# Patient Record
Sex: FEMALE | Race: OTHER | Employment: OTHER | URBAN - METROPOLITAN AREA
[De-identification: names, ages, dates, MRNs, and addresses within clinical notes are randomized per-mention and may not be internally consistent; named-entity substitution may affect disease eponyms.]

---

## 2018-03-14 ENCOUNTER — HOSPITAL ENCOUNTER (OUTPATIENT)
Dept: PREADMISSION TESTING | Age: 58
Discharge: HOME | End: 2018-03-14
Payer: MEDICARE

## 2018-03-14 VITALS
SYSTOLIC BLOOD PRESSURE: 118 MMHG | BODY MASS INDEX: 30.73 KG/M2 | OXYGEN SATURATION: 98 % | RESPIRATION RATE: 18 BRPM | HEIGHT: 64 IN | HEART RATE: 77 BPM | TEMPERATURE: 98.2 F | DIASTOLIC BLOOD PRESSURE: 65 MMHG | WEIGHT: 180 LBS

## 2018-03-14 DIAGNOSIS — Z01.818 PREOP TESTING: Primary | ICD-10-CM

## 2018-03-14 LAB
EKG ATRIAL RATE: 70 BPM
EKG P AXIS: 55 DEGREES
EKG P-R INTERVAL: 134 MS
EKG Q-T INTERVAL: 394 MS
EKG QRS DURATION: 82 MS
EKG QTC CALCULATION (BAZETT): 425 MS
EKG R AXIS: 18 DEGREES
EKG T AXIS: 17 DEGREES
EKG VENTRICULAR RATE: 70 BPM
HCT VFR BLD CALC: 37.8 % (ref 34–48)
HEMOGLOBIN: 12.1 G/DL (ref 11.5–15.5)
MCH RBC QN AUTO: 28.2 PG (ref 26–35)
MCHC RBC AUTO-ENTMCNC: 32 % (ref 32–34.5)
MCV RBC AUTO: 88.1 FL (ref 80–99.9)
PDW BLD-RTO: 14.6 FL (ref 11.5–15)
PLATELET # BLD: 198 E9/L (ref 130–450)
PMV BLD AUTO: 11.5 FL (ref 7–12)
RBC # BLD: 4.29 E12/L (ref 3.5–5.5)
WBC # BLD: 5 E9/L (ref 4.5–11.5)

## 2018-03-14 PROCEDURE — 85027 COMPLETE CBC AUTOMATED: CPT

## 2018-03-14 PROCEDURE — 93005 ELECTROCARDIOGRAM TRACING: CPT

## 2018-03-14 PROCEDURE — 36415 COLL VENOUS BLD VENIPUNCTURE: CPT

## 2018-03-14 PROCEDURE — 87081 CULTURE SCREEN ONLY: CPT

## 2018-03-14 RX ORDER — FENTANYL CITRATE 50 UG/ML
100 INJECTION, SOLUTION INTRAMUSCULAR; INTRAVENOUS ONCE
Status: CANCELLED | OUTPATIENT
Start: 2018-03-14 | End: 2018-03-14

## 2018-03-14 RX ORDER — LIDOCAINE HYDROCHLORIDE 10 MG/ML
10 INJECTION, SOLUTION INFILTRATION; PERINEURAL
Status: CANCELLED | OUTPATIENT
Start: 2018-03-14 | End: 2018-03-14

## 2018-03-14 RX ORDER — ROPIVACAINE HYDROCHLORIDE 5 MG/ML
30 INJECTION, SOLUTION EPIDURAL; INFILTRATION; PERINEURAL
Status: CANCELLED | OUTPATIENT
Start: 2018-03-14 | End: 2018-03-14

## 2018-03-14 RX ORDER — DEXAMETHASONE SODIUM PHOSPHATE 4 MG/ML
4 INJECTION, SOLUTION INTRA-ARTICULAR; INTRALESIONAL; INTRAMUSCULAR; INTRAVENOUS; SOFT TISSUE ONCE
Status: CANCELLED | OUTPATIENT
Start: 2018-03-14 | End: 2018-03-14

## 2018-03-14 RX ORDER — MIDAZOLAM HYDROCHLORIDE 1 MG/ML
1 INJECTION INTRAMUSCULAR; INTRAVENOUS EVERY 5 MIN PRN
Status: CANCELLED | OUTPATIENT
Start: 2018-03-14

## 2018-03-14 NOTE — PROGRESS NOTES
[facility] PRE-ADMISSION TESTING INSTRUCTIONS  Surgery Date 3-21-18     Arrival 10:30 a.m. The Preadmission Testing patient is instructed accordingly using the following criteria (check applicable):    ARRIVAL INSTRUCTIONS:  [x] Parking the day of Surgery is located in the Main Entrance lot. Upon entering the door, make an immediate right to the surgery reception desk    [x] Complimentary [facility] Parking is available Monday through Friday 6 am to 6 pm    [x] Bring photo ID and insurance card    [] Bring in a copy of Living will or Durable Power of  papers. [] Please be sure to arrange transportation to and from the hospital    [] Please arrange for someone to be with you for the 24 hour period post procedure due to having anesthesia      GENERAL INSTRUCTIONS:    [x] Nothing by mouth after midnight, including gum, candy, mints or water    [x] You may brush your teeth, but do not swallow any water    [x] Take medications as instructed with 1-2 oz of water    [x] Stop herbal supplements and vitamins 5 days prior to procedure    [x] Follow preop dosing of blood thinners per physician instructions    [] Do not take insulin or oral diabetic medications    [] If diabetic and have low blood sugar or feel symptomatic, take 1-2oz apple juice or glucose tablets    [] Bring inhalers day of surgery    [] Bring C-PAP/ Bi-Pap day of surgery    [] Bring urine specimen day of surgery    [x] Shower or bath with soap, lather and rinse well, AM of Surgery, no lotion, powders or creams to surgical site    [] Follow bowel prep as instructed per surgeon    [x] No tobacco products within 24 hours of surgery     [x] No alcohol or illegal drug use within 24 hours of surgery.     [x] Jewelry, body piercing's, eyeglasses, contact lenses and dentures are not permitted into surgery (bring cases)      [x] Please do not wear any nail polish, make up or hair products on the day of surgery    [x] If not already

## 2018-03-16 LAB — MRSA CULTURE ONLY: NORMAL

## 2018-03-20 ENCOUNTER — PREP FOR PROCEDURE (OUTPATIENT)
Dept: ORTHOPEDIC SURGERY | Age: 58
End: 2018-03-20

## 2018-03-20 DIAGNOSIS — M20.22 HALLUX RIGIDUS OF LEFT FOOT: ICD-10-CM

## 2018-03-20 DIAGNOSIS — M19.072 ARTHRITIS OF LEFT ANKLE: Chronic | ICD-10-CM

## 2018-03-21 ENCOUNTER — ANESTHESIA (OUTPATIENT)
Dept: OPERATING ROOM | Age: 58
End: 2018-03-21
Payer: MEDICARE

## 2018-03-21 ENCOUNTER — HOSPITAL ENCOUNTER (OUTPATIENT)
Age: 58
Discharge: HOME | End: 2018-03-22
Attending: ORTHOPAEDIC SURGERY | Admitting: ORTHOPAEDIC SURGERY
Payer: MEDICARE

## 2018-03-21 ENCOUNTER — APPOINTMENT (OUTPATIENT)
Dept: GENERAL RADIOLOGY | Age: 58
End: 2018-03-21
Attending: ORTHOPAEDIC SURGERY
Payer: MEDICARE

## 2018-03-21 ENCOUNTER — ANESTHESIA EVENT (OUTPATIENT)
Dept: OPERATING ROOM | Age: 58
End: 2018-03-21
Payer: MEDICARE

## 2018-03-21 VITALS
TEMPERATURE: 97.2 F | OXYGEN SATURATION: 93 % | DIASTOLIC BLOOD PRESSURE: 90 MMHG | SYSTOLIC BLOOD PRESSURE: 162 MMHG | RESPIRATION RATE: 25 BRPM

## 2018-03-21 DIAGNOSIS — M19.072 ARTHRITIS OF ANKLE, LEFT: ICD-10-CM

## 2018-03-21 DIAGNOSIS — M19.072 ARTHRITIS OF LEFT ANKLE: Primary | Chronic | ICD-10-CM

## 2018-03-21 DIAGNOSIS — R52 PAIN: ICD-10-CM

## 2018-03-21 DIAGNOSIS — M20.22 HALLUX RIGIDUS OF LEFT FOOT: ICD-10-CM

## 2018-03-21 PROCEDURE — 2580000003 HC RX 258: Performed by: NURSE ANESTHETIST, CERTIFIED REGISTERED

## 2018-03-21 PROCEDURE — 6360000002 HC RX W HCPCS: Performed by: ANESTHESIOLOGY

## 2018-03-21 PROCEDURE — 2500000003 HC RX 250 WO HCPCS: Performed by: NURSE ANESTHETIST, CERTIFIED REGISTERED

## 2018-03-21 PROCEDURE — 3700000000 HC ANESTHESIA ATTENDED CARE: Performed by: ORTHOPAEDIC SURGERY

## 2018-03-21 PROCEDURE — 2580000003 HC RX 258: Performed by: ANESTHESIOLOGY

## 2018-03-21 PROCEDURE — 2720000010 HC SURG SUPPLY STERILE: Performed by: ORTHOPAEDIC SURGERY

## 2018-03-21 PROCEDURE — C1713 ANCHOR/SCREW BN/BN,TIS/BN: HCPCS | Performed by: ORTHOPAEDIC SURGERY

## 2018-03-21 PROCEDURE — 3600000004 HC SURGERY LEVEL 4 BASE: Performed by: ORTHOPAEDIC SURGERY

## 2018-03-21 PROCEDURE — 6360000002 HC RX W HCPCS: Performed by: NURSE ANESTHETIST, CERTIFIED REGISTERED

## 2018-03-21 PROCEDURE — 64445 NJX AA&/STRD SCIATIC NRV IMG: CPT | Performed by: ANESTHESIOLOGY

## 2018-03-21 PROCEDURE — 6360000002 HC RX W HCPCS: Performed by: STUDENT IN AN ORGANIZED HEALTH CARE EDUCATION/TRAINING PROGRAM

## 2018-03-21 PROCEDURE — C1776 JOINT DEVICE (IMPLANTABLE): HCPCS | Performed by: ORTHOPAEDIC SURGERY

## 2018-03-21 PROCEDURE — 88311 DECALCIFY TISSUE: CPT

## 2018-03-21 PROCEDURE — 3600000014 HC SURGERY LEVEL 4 ADDTL 15MIN: Performed by: ORTHOPAEDIC SURGERY

## 2018-03-21 PROCEDURE — 6370000000 HC RX 637 (ALT 250 FOR IP): Performed by: STUDENT IN AN ORGANIZED HEALTH CARE EDUCATION/TRAINING PROGRAM

## 2018-03-21 PROCEDURE — 6360000002 HC RX W HCPCS

## 2018-03-21 PROCEDURE — 76000 FLUOROSCOPY <1 HR PHYS/QHP: CPT

## 2018-03-21 PROCEDURE — 7100000000 HC PACU RECOVERY - FIRST 15 MIN: Performed by: ORTHOPAEDIC SURGERY

## 2018-03-21 PROCEDURE — 2700000000 HC OXYGEN THERAPY PER DAY

## 2018-03-21 PROCEDURE — 7100000001 HC PACU RECOVERY - ADDTL 15 MIN: Performed by: ORTHOPAEDIC SURGERY

## 2018-03-21 PROCEDURE — 2580000003 HC RX 258: Performed by: STUDENT IN AN ORGANIZED HEALTH CARE EDUCATION/TRAINING PROGRAM

## 2018-03-21 PROCEDURE — 3700000001 HC ADD 15 MINUTES (ANESTHESIA): Performed by: ORTHOPAEDIC SURGERY

## 2018-03-21 PROCEDURE — 88304 TISSUE EXAM BY PATHOLOGIST: CPT

## 2018-03-21 DEVICE — DISCONTINUED USE 415964 CEMENT PALACOS R + G SING DOSE 40GR: Type: IMPLANTABLE DEVICE | Site: ANKLE | Status: FUNCTIONAL

## 2018-03-21 DEVICE — SCREW, BONE REMOVER
Type: IMPLANTABLE DEVICE | Status: FUNCTIONAL
Brand: INBONE

## 2018-03-21 DEVICE — IMPLANTABLE DEVICE
Type: IMPLANTABLE DEVICE | Site: ANKLE | Status: FUNCTIONAL
Brand: INFINITY

## 2018-03-21 RX ORDER — FLUOXETINE HYDROCHLORIDE 20 MG/1
40 CAPSULE ORAL 2 TIMES DAILY
Status: DISCONTINUED | OUTPATIENT
Start: 2018-03-21 | End: 2018-03-22 | Stop reason: HOSPADM

## 2018-03-21 RX ORDER — HYDROCODONE BITARTRATE AND ACETAMINOPHEN 5; 325 MG/1; MG/1
2 TABLET ORAL EVERY 4 HOURS PRN
Status: DISCONTINUED | OUTPATIENT
Start: 2018-03-21 | End: 2018-03-22

## 2018-03-21 RX ORDER — SODIUM CHLORIDE 9 MG/ML
INJECTION, SOLUTION INTRAVENOUS CONTINUOUS
Status: DISCONTINUED | OUTPATIENT
Start: 2018-03-21 | End: 2018-03-22 | Stop reason: HOSPADM

## 2018-03-21 RX ORDER — ALPRAZOLAM 0.25 MG/1
0.5 TABLET ORAL EVERY 12 HOURS PRN
Status: DISCONTINUED | OUTPATIENT
Start: 2018-03-21 | End: 2018-03-22 | Stop reason: HOSPADM

## 2018-03-21 RX ORDER — NEOSTIGMINE METHYLSULFATE 1 MG/ML
INJECTION, SOLUTION INTRAVENOUS PRN
Status: DISCONTINUED | OUTPATIENT
Start: 2018-03-21 | End: 2018-03-21

## 2018-03-21 RX ORDER — DEXAMETHASONE SODIUM PHOSPHATE 4 MG/ML
INJECTION, SOLUTION INTRA-ARTICULAR; INTRALESIONAL; INTRAMUSCULAR; INTRAVENOUS; SOFT TISSUE PRN
Status: DISCONTINUED | OUTPATIENT
Start: 2018-03-21 | End: 2018-03-21 | Stop reason: SDUPTHER

## 2018-03-21 RX ORDER — LIDOCAINE HYDROCHLORIDE 20 MG/ML
INJECTION, SOLUTION INFILTRATION; PERINEURAL PRN
Status: DISCONTINUED | OUTPATIENT
Start: 2018-03-21 | End: 2018-03-21 | Stop reason: SDUPTHER

## 2018-03-21 RX ORDER — HYDROCODONE BITARTRATE AND ACETAMINOPHEN 5; 325 MG/1; MG/1
1 TABLET ORAL EVERY 4 HOURS PRN
Status: DISCONTINUED | OUTPATIENT
Start: 2018-03-21 | End: 2018-03-22

## 2018-03-21 RX ORDER — MORPHINE SULFATE 2 MG/ML
2 INJECTION, SOLUTION INTRAMUSCULAR; INTRAVENOUS
Status: DISCONTINUED | OUTPATIENT
Start: 2018-03-21 | End: 2018-03-22 | Stop reason: HOSPADM

## 2018-03-21 RX ORDER — ZOLPIDEM TARTRATE 5 MG/1
5 TABLET ORAL NIGHTLY PRN
Status: DISCONTINUED | OUTPATIENT
Start: 2018-03-21 | End: 2018-03-22 | Stop reason: HOSPADM

## 2018-03-21 RX ORDER — SODIUM CHLORIDE 0.9 % (FLUSH) 0.9 %
10 SYRINGE (ML) INJECTION PRN
Status: DISCONTINUED | OUTPATIENT
Start: 2018-03-21 | End: 2018-03-22 | Stop reason: HOSPADM

## 2018-03-21 RX ORDER — ONDANSETRON 2 MG/ML
INJECTION INTRAMUSCULAR; INTRAVENOUS PRN
Status: DISCONTINUED | OUTPATIENT
Start: 2018-03-21 | End: 2018-03-21 | Stop reason: SDUPTHER

## 2018-03-21 RX ORDER — FENTANYL CITRATE 50 UG/ML
INJECTION, SOLUTION INTRAMUSCULAR; INTRAVENOUS PRN
Status: DISCONTINUED | OUTPATIENT
Start: 2018-03-21 | End: 2018-03-21 | Stop reason: SDUPTHER

## 2018-03-21 RX ORDER — MORPHINE SULFATE 4 MG/ML
4 INJECTION, SOLUTION INTRAMUSCULAR; INTRAVENOUS
Status: DISCONTINUED | OUTPATIENT
Start: 2018-03-21 | End: 2018-03-22 | Stop reason: HOSPADM

## 2018-03-21 RX ORDER — LIDOCAINE HYDROCHLORIDE 10 MG/ML
10 INJECTION, SOLUTION INFILTRATION; PERINEURAL
Status: DISCONTINUED | OUTPATIENT
Start: 2018-03-21 | End: 2018-03-21 | Stop reason: HOSPADM

## 2018-03-21 RX ORDER — ROPIVACAINE HYDROCHLORIDE 5 MG/ML
30 INJECTION, SOLUTION EPIDURAL; INFILTRATION; PERINEURAL
Status: COMPLETED | OUTPATIENT
Start: 2018-03-21 | End: 2018-03-21

## 2018-03-21 RX ORDER — ACETAMINOPHEN 325 MG/1
650 TABLET ORAL EVERY 4 HOURS PRN
Status: DISCONTINUED | OUTPATIENT
Start: 2018-03-21 | End: 2018-03-22 | Stop reason: HOSPADM

## 2018-03-21 RX ORDER — GLYCOPYRROLATE 0.2 MG/ML
INJECTION INTRAMUSCULAR; INTRAVENOUS PRN
Status: DISCONTINUED | OUTPATIENT
Start: 2018-03-21 | End: 2018-03-21 | Stop reason: SDUPTHER

## 2018-03-21 RX ORDER — DEXAMETHASONE SODIUM PHOSPHATE 4 MG/ML
4 INJECTION, SOLUTION INTRA-ARTICULAR; INTRALESIONAL; INTRAMUSCULAR; INTRAVENOUS; SOFT TISSUE ONCE
Status: DISCONTINUED | OUTPATIENT
Start: 2018-03-21 | End: 2018-03-21

## 2018-03-21 RX ORDER — PROPOFOL 10 MG/ML
INJECTION, EMULSION INTRAVENOUS PRN
Status: DISCONTINUED | OUTPATIENT
Start: 2018-03-21 | End: 2018-03-21 | Stop reason: SDUPTHER

## 2018-03-21 RX ORDER — NEOSTIGMINE METHYLSULFATE 1 MG/ML
INJECTION, SOLUTION INTRAVENOUS PRN
Status: DISCONTINUED | OUTPATIENT
Start: 2018-03-21 | End: 2018-03-21 | Stop reason: SDUPTHER

## 2018-03-21 RX ORDER — FENTANYL CITRATE 50 UG/ML
100 INJECTION, SOLUTION INTRAMUSCULAR; INTRAVENOUS ONCE
Status: COMPLETED | OUTPATIENT
Start: 2018-03-21 | End: 2018-03-21

## 2018-03-21 RX ORDER — DOCUSATE SODIUM 100 MG/1
100 CAPSULE, LIQUID FILLED ORAL 2 TIMES DAILY
Status: DISCONTINUED | OUTPATIENT
Start: 2018-03-21 | End: 2018-03-22 | Stop reason: HOSPADM

## 2018-03-21 RX ORDER — ROCURONIUM BROMIDE 10 MG/ML
INJECTION, SOLUTION INTRAVENOUS PRN
Status: DISCONTINUED | OUTPATIENT
Start: 2018-03-21 | End: 2018-03-21 | Stop reason: SDUPTHER

## 2018-03-21 RX ORDER — MORPHINE SULFATE 2 MG/ML
INJECTION, SOLUTION INTRAMUSCULAR; INTRAVENOUS
Status: COMPLETED
Start: 2018-03-21 | End: 2018-03-21

## 2018-03-21 RX ORDER — MORPHINE SULFATE 2 MG/ML
2 INJECTION, SOLUTION INTRAMUSCULAR; INTRAVENOUS EVERY 5 MIN PRN
Status: COMPLETED | OUTPATIENT
Start: 2018-03-21 | End: 2018-03-21

## 2018-03-21 RX ORDER — HYDROCODONE BITARTRATE AND ACETAMINOPHEN 5; 325 MG/1; MG/1
1 TABLET ORAL EVERY 4 HOURS PRN
Qty: 40 TABLET | Refills: 0 | Status: SHIPPED | OUTPATIENT
Start: 2018-03-21 | End: 2018-03-28

## 2018-03-21 RX ORDER — SODIUM CHLORIDE 0.9 % (FLUSH) 0.9 %
10 SYRINGE (ML) INJECTION EVERY 12 HOURS SCHEDULED
Status: DISCONTINUED | OUTPATIENT
Start: 2018-03-21 | End: 2018-03-22 | Stop reason: HOSPADM

## 2018-03-21 RX ORDER — MIDAZOLAM HYDROCHLORIDE 1 MG/ML
INJECTION INTRAMUSCULAR; INTRAVENOUS PRN
Status: DISCONTINUED | OUTPATIENT
Start: 2018-03-21 | End: 2018-03-21 | Stop reason: SDUPTHER

## 2018-03-21 RX ORDER — ONDANSETRON 2 MG/ML
4 INJECTION INTRAMUSCULAR; INTRAVENOUS EVERY 6 HOURS PRN
Status: DISCONTINUED | OUTPATIENT
Start: 2018-03-21 | End: 2018-03-22 | Stop reason: HOSPADM

## 2018-03-21 RX ORDER — SODIUM CHLORIDE 9 MG/ML
INJECTION, SOLUTION INTRAVENOUS CONTINUOUS PRN
Status: DISCONTINUED | OUTPATIENT
Start: 2018-03-21 | End: 2018-03-21 | Stop reason: SDUPTHER

## 2018-03-21 RX ORDER — MIDAZOLAM HYDROCHLORIDE 1 MG/ML
1 INJECTION INTRAMUSCULAR; INTRAVENOUS EVERY 5 MIN PRN
Status: COMPLETED | OUTPATIENT
Start: 2018-03-21 | End: 2018-03-21

## 2018-03-21 RX ORDER — KETOROLAC TROMETHAMINE 30 MG/ML
30 INJECTION, SOLUTION INTRAMUSCULAR; INTRAVENOUS ONCE
Status: COMPLETED | OUTPATIENT
Start: 2018-03-21 | End: 2018-03-21

## 2018-03-21 RX ADMIN — SODIUM CHLORIDE: 9 INJECTION, SOLUTION INTRAVENOUS at 13:25

## 2018-03-21 RX ADMIN — PHENYLEPHRINE HYDROCHLORIDE 100 MCG: 10 INJECTION INTRAVENOUS at 14:05

## 2018-03-21 RX ADMIN — DOCUSATE SODIUM 100 MG: 100 CAPSULE, LIQUID FILLED ORAL at 20:45

## 2018-03-21 RX ADMIN — Medication 10 ML: at 21:49

## 2018-03-21 RX ADMIN — SODIUM CHLORIDE: 9 INJECTION, SOLUTION INTRAVENOUS at 17:45

## 2018-03-21 RX ADMIN — ONDANSETRON 4 MG: 2 INJECTION, SOLUTION INTRAMUSCULAR; INTRAVENOUS at 16:00

## 2018-03-21 RX ADMIN — FENTANYL CITRATE 50 MCG: 50 INJECTION, SOLUTION INTRAMUSCULAR; INTRAVENOUS at 15:20

## 2018-03-21 RX ADMIN — Medication 2 MG: at 16:32

## 2018-03-21 RX ADMIN — ROCURONIUM BROMIDE 50 MG: 10 SOLUTION INTRAVENOUS at 13:33

## 2018-03-21 RX ADMIN — MORPHINE SULFATE 2 MG: 2 INJECTION, SOLUTION INTRAMUSCULAR; INTRAVENOUS at 17:14

## 2018-03-21 RX ADMIN — Medication 0.4 MG: at 16:32

## 2018-03-21 RX ADMIN — MIDAZOLAM HYDROCHLORIDE 1 MG: 1 INJECTION, SOLUTION INTRAMUSCULAR; INTRAVENOUS at 12:20

## 2018-03-21 RX ADMIN — ROCURONIUM BROMIDE 15 MG: 10 SOLUTION INTRAVENOUS at 15:15

## 2018-03-21 RX ADMIN — MIDAZOLAM HYDROCHLORIDE 1 MG: 1 INJECTION, SOLUTION INTRAMUSCULAR; INTRAVENOUS at 12:14

## 2018-03-21 RX ADMIN — ROPIVACAINE HYDROCHLORIDE 30 ML: 5 INJECTION, SOLUTION EPIDURAL; INFILTRATION; PERINEURAL at 12:36

## 2018-03-21 RX ADMIN — SODIUM CHLORIDE: 9 INJECTION, SOLUTION INTRAVENOUS at 19:50

## 2018-03-21 RX ADMIN — DEXAMETHASONE SODIUM PHOSPHATE 10 MG: 4 INJECTION, SOLUTION INTRA-ARTICULAR; INTRALESIONAL; INTRAMUSCULAR; INTRAVENOUS; SOFT TISSUE at 13:33

## 2018-03-21 RX ADMIN — MORPHINE SULFATE 2 MG: 2 INJECTION, SOLUTION INTRAMUSCULAR; INTRAVENOUS at 17:20

## 2018-03-21 RX ADMIN — MORPHINE SULFATE 2 MG: 2 INJECTION, SOLUTION INTRAMUSCULAR; INTRAVENOUS at 17:26

## 2018-03-21 RX ADMIN — FENTANYL CITRATE 50 MCG: 50 INJECTION, SOLUTION INTRAMUSCULAR; INTRAVENOUS at 15:15

## 2018-03-21 RX ADMIN — MORPHINE SULFATE 2 MG: 2 INJECTION, SOLUTION INTRAMUSCULAR; INTRAVENOUS at 17:43

## 2018-03-21 RX ADMIN — FENTANYL CITRATE 100 MCG: 50 INJECTION, SOLUTION INTRAMUSCULAR; INTRAVENOUS at 12:14

## 2018-03-21 RX ADMIN — PROPOFOL 200 MG: 10 INJECTION, EMULSION INTRAVENOUS at 13:33

## 2018-03-21 RX ADMIN — PHENYLEPHRINE HYDROCHLORIDE 100 MCG: 10 INJECTION INTRAVENOUS at 13:59

## 2018-03-21 RX ADMIN — MIDAZOLAM HYDROCHLORIDE 2 MG: 1 INJECTION, SOLUTION INTRAMUSCULAR; INTRAVENOUS at 13:25

## 2018-03-21 RX ADMIN — ZOLPIDEM TARTRATE 5 MG: 5 TABLET, FILM COATED ORAL at 21:49

## 2018-03-21 RX ADMIN — CEFAZOLIN SODIUM 2 G: 2 SOLUTION INTRAVENOUS at 13:25

## 2018-03-21 RX ADMIN — KETOROLAC TROMETHAMINE 30 MG: 30 INJECTION, SOLUTION INTRAMUSCULAR; INTRAVENOUS at 17:38

## 2018-03-21 RX ADMIN — HYDROCODONE BITARTRATE AND ACETAMINOPHEN 2 TABLET: 5; 325 TABLET ORAL at 20:45

## 2018-03-21 RX ADMIN — LIDOCAINE HYDROCHLORIDE 60 MG: 20 INJECTION, SOLUTION INFILTRATION; PERINEURAL at 13:33

## 2018-03-21 RX ADMIN — ROCURONIUM BROMIDE 10 MG: 10 SOLUTION INTRAVENOUS at 15:35

## 2018-03-21 RX ADMIN — CEFAZOLIN SODIUM 2 G: 2 SOLUTION INTRAVENOUS at 21:00

## 2018-03-21 RX ADMIN — MORPHINE SULFATE 2 MG: 2 INJECTION, SOLUTION INTRAMUSCULAR; INTRAVENOUS at 17:07

## 2018-03-21 RX ADMIN — FENTANYL CITRATE 100 MCG: 50 INJECTION, SOLUTION INTRAMUSCULAR; INTRAVENOUS at 13:33

## 2018-03-21 ASSESSMENT — PULMONARY FUNCTION TESTS
PIF_VALUE: 28
PIF_VALUE: 29
PIF_VALUE: 28
PIF_VALUE: 27
PIF_VALUE: 29
PIF_VALUE: 27
PIF_VALUE: 28
PIF_VALUE: 28
PIF_VALUE: 27
PIF_VALUE: 28
PIF_VALUE: 42
PIF_VALUE: 26
PIF_VALUE: 28
PIF_VALUE: 29
PIF_VALUE: 28
PIF_VALUE: 28
PIF_VALUE: 27
PIF_VALUE: 27
PIF_VALUE: 20
PIF_VALUE: 27
PIF_VALUE: 29
PIF_VALUE: 27
PIF_VALUE: 28
PIF_VALUE: 27
PIF_VALUE: 6
PIF_VALUE: 27
PIF_VALUE: 27
PIF_VALUE: 4
PIF_VALUE: 28
PIF_VALUE: 27
PIF_VALUE: 28
PIF_VALUE: 19
PIF_VALUE: 28
PIF_VALUE: 28
PIF_VALUE: 5
PIF_VALUE: 28
PIF_VALUE: 3
PIF_VALUE: 26
PIF_VALUE: 27
PIF_VALUE: 29
PIF_VALUE: 28
PIF_VALUE: 19
PIF_VALUE: 28
PIF_VALUE: 29
PIF_VALUE: 28
PIF_VALUE: 19
PIF_VALUE: 27
PIF_VALUE: 27
PIF_VALUE: 28
PIF_VALUE: 27
PIF_VALUE: 28
PIF_VALUE: 27
PIF_VALUE: 28
PIF_VALUE: 27
PIF_VALUE: 27
PIF_VALUE: 1
PIF_VALUE: 27
PIF_VALUE: 28
PIF_VALUE: 28
PIF_VALUE: 6
PIF_VALUE: 27
PIF_VALUE: 28
PIF_VALUE: 28
PIF_VALUE: 27
PIF_VALUE: 28
PIF_VALUE: 27
PIF_VALUE: 27
PIF_VALUE: 28
PIF_VALUE: 27
PIF_VALUE: 28
PIF_VALUE: 28
PIF_VALUE: 27
PIF_VALUE: 28
PIF_VALUE: 27
PIF_VALUE: 3
PIF_VALUE: 29
PIF_VALUE: 27
PIF_VALUE: 28
PIF_VALUE: 27
PIF_VALUE: 28
PIF_VALUE: 28
PIF_VALUE: 2
PIF_VALUE: 28
PIF_VALUE: 27
PIF_VALUE: 9
PIF_VALUE: 28
PIF_VALUE: 27
PIF_VALUE: 2
PIF_VALUE: 20
PIF_VALUE: 27
PIF_VALUE: 28
PIF_VALUE: 27
PIF_VALUE: 28
PIF_VALUE: 20
PIF_VALUE: 20
PIF_VALUE: 27
PIF_VALUE: 28
PIF_VALUE: 27
PIF_VALUE: 27
PIF_VALUE: 2
PIF_VALUE: 27
PIF_VALUE: 28
PIF_VALUE: 28
PIF_VALUE: 27
PIF_VALUE: 28
PIF_VALUE: 27
PIF_VALUE: 27
PIF_VALUE: 1
PIF_VALUE: 27
PIF_VALUE: 28
PIF_VALUE: 27
PIF_VALUE: 28
PIF_VALUE: 27
PIF_VALUE: 28
PIF_VALUE: 28
PIF_VALUE: 19
PIF_VALUE: 3
PIF_VALUE: 28
PIF_VALUE: 28
PIF_VALUE: 29
PIF_VALUE: 28
PIF_VALUE: 28
PIF_VALUE: 27
PIF_VALUE: 27
PIF_VALUE: 28
PIF_VALUE: 19
PIF_VALUE: 28
PIF_VALUE: 28
PIF_VALUE: 27
PIF_VALUE: 20
PIF_VALUE: 27
PIF_VALUE: 28
PIF_VALUE: 28
PIF_VALUE: 20
PIF_VALUE: 27
PIF_VALUE: 28
PIF_VALUE: 27
PIF_VALUE: 28
PIF_VALUE: 28
PIF_VALUE: 27
PIF_VALUE: 28
PIF_VALUE: 4
PIF_VALUE: 28
PIF_VALUE: 28
PIF_VALUE: 27
PIF_VALUE: 1
PIF_VALUE: 20
PIF_VALUE: 1
PIF_VALUE: 27
PIF_VALUE: 27
PIF_VALUE: 28
PIF_VALUE: 27
PIF_VALUE: 20
PIF_VALUE: 28
PIF_VALUE: 27
PIF_VALUE: 28
PIF_VALUE: 27
PIF_VALUE: 20
PIF_VALUE: 12
PIF_VALUE: 27
PIF_VALUE: 27

## 2018-03-21 ASSESSMENT — PAIN DESCRIPTION - ORIENTATION
ORIENTATION: LEFT

## 2018-03-21 ASSESSMENT — PAIN SCALES - GENERAL
PAINLEVEL_OUTOF10: 3
PAINLEVEL_OUTOF10: 7
PAINLEVEL_OUTOF10: 7
PAINLEVEL_OUTOF10: 0
PAINLEVEL_OUTOF10: 5
PAINLEVEL_OUTOF10: 9
PAINLEVEL_OUTOF10: 0
PAINLEVEL_OUTOF10: 4
PAINLEVEL_OUTOF10: 7
PAINLEVEL_OUTOF10: 9
PAINLEVEL_OUTOF10: 7
PAINLEVEL_OUTOF10: 7
PAINLEVEL_OUTOF10: 8
PAINLEVEL_OUTOF10: 8

## 2018-03-21 ASSESSMENT — PAIN DESCRIPTION - PAIN TYPE
TYPE: SURGICAL PAIN

## 2018-03-21 ASSESSMENT — PAIN DESCRIPTION - LOCATION
LOCATION: ANKLE

## 2018-03-21 ASSESSMENT — PAIN DESCRIPTION - DESCRIPTORS
DESCRIPTORS: DISCOMFORT
DESCRIPTORS: ACHING;DISCOMFORT
DESCRIPTORS: DISCOMFORT

## 2018-03-21 ASSESSMENT — PAIN DESCRIPTION - ONSET: ONSET: ON-GOING

## 2018-03-21 ASSESSMENT — PAIN DESCRIPTION - FREQUENCY
FREQUENCY: CONTINUOUS

## 2018-03-21 ASSESSMENT — PAIN - FUNCTIONAL ASSESSMENT: PAIN_FUNCTIONAL_ASSESSMENT: 0-10

## 2018-03-21 ASSESSMENT — PAIN DESCRIPTION - PROGRESSION: CLINICAL_PROGRESSION: GRADUALLY IMPROVING

## 2018-03-21 NOTE — ANESTHESIA PRE PROCEDURE
Department of Anesthesiology  Preprocedure Note       Name:  Lennox Becker   Age:  59 y.o.  :  1960                                          MRN:  [id number]         Date:  3/21/2018      Surgeon: Cordelia Cobos):  Cora Gaitan MD    Procedure: Procedure(s):  LEFT TOTAL ANKLE REPLACEMENT LEFT FOOT CHEILECTOMY  POSSIBLE ACHILLES TENDON LENGTHENING  ([facility]) ([facility])    Medications prior to admission:   Prior to Admission medications    Medication Sig Start Date End Date Taking? Authorizing Provider   zolpidem (AMBIEN) 5 MG tablet Take 5 mg by mouth nightly as needed for Sleep    Historical Provider, MD   Omega-3 Fatty Acids (FISH OIL PO) Take 1 tablet by mouth daily  16    Historical Provider, MD   Calcium Carbonate-Vitamin D (CALCIUM + D) 600-200 MG-UNIT TABS Take  by mouth daily. Historical Provider, MD   FLUoxetine (PROZAC) 20 MG capsule Take 40 mg by mouth 2 times daily Instructed to take morning of surgery with a sip of water (takes total of 60 mg daily)    Historical Provider, MD   ALPRAZolam (XANAX) 0.5 MG tablet Take 0.5 mg by mouth as needed for Anxiety Instructed to take morning of surgery with a sip of water if needed. Historical Provider, MD   Multiple Vitamins-Minerals (CENTRUM PO) Take by mouth  16    Historical Provider, MD       Current medications:    No current facility-administered medications for this visit. No current outpatient prescriptions on file.      Facility-Administered Medications Ordered in Other Visits   Medication Dose Route Frequency Provider Last Rate Last Dose    ceFAZolin (ANCEF) 2 g in dextrose 3 % 50 mL IVPB (duplex)  2 g Intravenous See Admin Instructions Sanket Hemp, DO        dexamethasone (DECADRON) injection 4 mg  4 mg Intravenous Once Raghavendra Hay MD        fentaNYL (SUBLIMAZE) injection 100 mcg  100 mcg Intravenous Once Raghavendra Hay MD        lidocaine 1 % injection 10 mL  10 mL Intradermal Once PRN Raghavendra Hay MD  midazolam (VERSED) injection 1 mg  1 mg Intravenous Q5 Min PRN Mata Ye MD        ropivacaine (NAROPIN) 0.5% injection 30 mL  30 mL Infiltration Once PRN Mata Ye MD           Allergies: Allergies   Allergen Reactions    Percocet [Oxycodone-Acetaminophen] Nausea And Vomiting and Other (See Comments)     Felt like \"bugs crawling on skin\"    Percodan [Oxycodone-Aspirin] Nausea And Vomiting       Problem List:    Patient Active Problem List   Diagnosis Code    Arthritis of left ankle M19.072    Hallux rigidus of left foot M20.22    Arthritis of ankle, left M19.072       Past Medical History:        Diagnosis Date    Anxiety     Arthritis     RA    Asthma     no treatment, had as a child    Depression     Diverticulitis     History of stomach ulcers     IBS (irritable bowel syndrome) \"I have 2 kinks in my bowel\"    PTSD (post-traumatic stress disorder)        Past Surgical History:        Procedure Laterality Date    ANKLE SURGERY      right, left    BUNIONECTOMY      right , left    CARPAL TUNNEL RELEASE      right    FOOT NEUROMA SURGERY      bilat    KNEE ARTHROSCOPY      left    OTHER SURGICAL HISTORY      excision neck mass    SHOULDER SURGERY      right, spurs    TONSILLECTOMY      TUBAL LIGATION      UPPER GASTROINTESTINAL ENDOSCOPY  4/4/2016       Social History:    Social History   Substance Use Topics    Smoking status: Former Smoker     Packs/day: 0.25     Years: 10.00     Quit date: 11/9/2010    Smokeless tobacco: Never Used    Alcohol use Yes      Comment: social                                Counseling given: Not Answered      Vital Signs (Current): There were no vitals filed for this visit.                                            BP Readings from Last 3 Encounters:   03/21/18 (!) 115/59   03/14/18 118/65   08/11/16 100/58       NPO Status:                                                                                 BMI:   Wt Readings from Last 3

## 2018-03-21 NOTE — PROGRESS NOTES
Report to Malvin Villanueva 7th floor  [address] [facility] recalled to see paper for [facility] to set up for discharge

## 2018-03-21 NOTE — H&P
Department of Orthopedic Surgery  Resident H&P Note          CHIEF COMPLAINT:  L ankle DJD, L Hallux Rigidus    HISTORY OF PRESENT ILLNESS:                The patient is a 59 y.o. female who presents for L total ankle arthroplasty and L cheilectomy great toe. Longstanding RA with DJD of the L ankle and L great to MTPJ. Has not gotten relief with conservative tx. Past Medical History:        Diagnosis Date    Anxiety     Arthritis     RA    Asthma     no treatment, had as a child    Depression     Diverticulitis     History of stomach ulcers     IBS (irritable bowel syndrome) \"I have 2 kinks in my bowel\"    PTSD (post-traumatic stress disorder)      Past Surgical History:        Procedure Laterality Date    ANKLE SURGERY      right, left    BUNIONECTOMY      right , left    CARPAL TUNNEL RELEASE      right    FOOT NEUROMA SURGERY      bilat    KNEE ARTHROSCOPY      left    OTHER SURGICAL HISTORY      excision neck mass    SHOULDER SURGERY      right, spurs    TONSILLECTOMY      TUBAL LIGATION      UPPER GASTROINTESTINAL ENDOSCOPY  4/4/2016     Current Medications:   Current Facility-Administered Medications: ceFAZolin (ANCEF) 2 g in dextrose 3 % 50 mL IVPB (duplex), 2 g, Intravenous, See Admin Instructions  lidocaine 1 % injection 10 mL, 10 mL, Intradermal, Once PRN  HYDROmorphone (DILAUDID) injection 0.25 mg, 0.25 mg, Intravenous, Q5 Min PRN  HYDROmorphone (DILAUDID) injection 0.5 mg, 0.5 mg, Intravenous, Q5 Min PRN  HYDROmorphone (DILAUDID) injection 0.25 mg, 0.25 mg, Intravenous, Q5 Min PRN  HYDROmorphone (DILAUDID) injection 0.5 mg, 0.5 mg, Intravenous, Q5 Min PRN  Allergies:  Percocet [oxycodone-acetaminophen] and Percodan [oxycodone-aspirin]    Social History:   TOBACCO:   reports that she quit smoking about 7 years ago. She has a 2.50 pack-year smoking history. She has never used smokeless tobacco.  ETOH:   reports that she drinks alcohol.   DRUGS:   reports that she does not use drugs.  ACTIVITIES OF DAILY LIVING:    OCCUPATION:    Family History:   History reviewed. No pertinent family history.     REVIEW OF SYSTEMS:  CONSTITUTIONAL: negative for fevers, chills  EYES: negative for blurred vision, visual disturbance  HEENT: negative for hearing loss, voice change  RESPIRATORY: negative for dyspnea, wheezing  CARDIOVASCULAR: negative for chest pain, palpitations  GASTROINTESTINAL: negative for nausea, vomiting  GENITOURINARY: negative for frequency, urinary incontinence  HEMATOLOGIC/LYMPHATIC: negative for bleeding and petechiae  MUSCULOSKELETAL: + pain  NEUROLOGICAL: negative for headaches, dizziness  BEHAVIOR/PSYCH: negative for increased agitation and anxiety    PHYSICAL EXAM:    VITALS:  /64   Pulse 62   Temp 97.2 °F (36.2 °C) (Temporal)   Resp 14   Ht 5' 4\" (1.626 m)   Wt 180 lb (81.6 kg)   SpO2 94%   BMI 30.90 kg/m²   CONSTITUTIONAL:  awake, alert, cooperative, no apparent distress, and appears stated age  MUSCULOSKELETAL:  Left lower Extremity:  · Skin intact, prior incision over great toe MTPJ well healed  · Decreased ROM of great toe, palpable osteophytes to MTPJ  · Decreased and painful ROM to ankle joint  · Palpable DP and PT  · Sensation intact to light touch to T, S, S, SP, DP  · Demonstrates active ROM to ankle and toes    DATA:    CBC:   Lab Results   Component Value Date    WBC 5.0 03/14/2018    RBC 4.29 03/14/2018    HGB 12.1 03/14/2018    HCT 37.8 03/14/2018    MCV 88.1 03/14/2018    MCH 28.2 03/14/2018    MCHC 32.0 03/14/2018    RDW 14.6 03/14/2018     03/14/2018    MPV 11.5 03/14/2018     PT/INR:  No results found for: PROTIME, INR      IMPRESSION:  · L ankle DJD, L Hallux Rigidus    PLAN:  · L Total ankle arthroplasty, L great toe cheilectomy  · Discussed with Dr. Melinda Watkins

## 2018-03-22 VITALS
BODY MASS INDEX: 38.48 KG/M2 | DIASTOLIC BLOOD PRESSURE: 57 MMHG | RESPIRATION RATE: 16 BRPM | SYSTOLIC BLOOD PRESSURE: 120 MMHG | HEIGHT: 64 IN | TEMPERATURE: 98.6 F | WEIGHT: 225.4 LBS | HEART RATE: 80 BPM | OXYGEN SATURATION: 99 %

## 2018-03-22 LAB
HCT VFR BLD CALC: 34 % (ref 34–48)
HEMOGLOBIN: 10.5 G/DL (ref 11.5–15.5)
MCH RBC QN AUTO: 28.5 PG (ref 26–35)
MCHC RBC AUTO-ENTMCNC: 30.9 % (ref 32–34.5)
MCV RBC AUTO: 92.1 FL (ref 80–99.9)
PDW BLD-RTO: 15 FL (ref 11.5–15)
PLATELET # BLD: 189 E9/L (ref 130–450)
PMV BLD AUTO: 11.6 FL (ref 7–12)
RBC # BLD: 3.69 E12/L (ref 3.5–5.5)
WBC # BLD: 8.4 E9/L (ref 4.5–11.5)

## 2018-03-22 PROCEDURE — 97161 PT EVAL LOW COMPLEX 20 MIN: CPT

## 2018-03-22 PROCEDURE — 6370000000 HC RX 637 (ALT 250 FOR IP): Performed by: ORTHOPAEDIC SURGERY

## 2018-03-22 PROCEDURE — G8982 BODY POS GOAL STATUS: HCPCS

## 2018-03-22 PROCEDURE — 36415 COLL VENOUS BLD VENIPUNCTURE: CPT

## 2018-03-22 PROCEDURE — 2580000003 HC RX 258: Performed by: ANESTHESIOLOGY

## 2018-03-22 PROCEDURE — 6360000002 HC RX W HCPCS: Performed by: STUDENT IN AN ORGANIZED HEALTH CARE EDUCATION/TRAINING PROGRAM

## 2018-03-22 PROCEDURE — 6370000000 HC RX 637 (ALT 250 FOR IP): Performed by: STUDENT IN AN ORGANIZED HEALTH CARE EDUCATION/TRAINING PROGRAM

## 2018-03-22 PROCEDURE — 2700000000 HC OXYGEN THERAPY PER DAY

## 2018-03-22 PROCEDURE — G8981 BODY POS CURRENT STATUS: HCPCS

## 2018-03-22 PROCEDURE — 85027 COMPLETE CBC AUTOMATED: CPT

## 2018-03-22 PROCEDURE — 97530 THERAPEUTIC ACTIVITIES: CPT

## 2018-03-22 RX ORDER — HYDROCODONE BITARTRATE AND ACETAMINOPHEN 10; 325 MG/1; MG/1
TABLET ORAL
Qty: 84 TABLET | Refills: 0 | Status: SHIPPED | OUTPATIENT
Start: 2018-03-22 | End: 2018-03-29

## 2018-03-22 RX ORDER — PSEUDOEPHEDRINE HCL 30 MG
100 TABLET ORAL 2 TIMES DAILY
Qty: 30 CAPSULE | Refills: 1 | Status: SHIPPED | OUTPATIENT
Start: 2018-03-22 | End: 2019-02-14 | Stop reason: ALTCHOICE

## 2018-03-22 RX ORDER — HYDROCODONE BITARTRATE AND ACETAMINOPHEN 10; 325 MG/1; MG/1
1 TABLET ORAL EVERY 6 HOURS PRN
Status: DISCONTINUED | OUTPATIENT
Start: 2018-03-22 | End: 2018-03-22 | Stop reason: HOSPADM

## 2018-03-22 RX ORDER — HYDROCODONE BITARTRATE AND ACETAMINOPHEN 10; 325 MG/1; MG/1
2 TABLET ORAL EVERY 6 HOURS PRN
Status: DISCONTINUED | OUTPATIENT
Start: 2018-03-22 | End: 2018-03-22 | Stop reason: HOSPADM

## 2018-03-22 RX ADMIN — SODIUM CHLORIDE: 9 INJECTION, SOLUTION INTRAVENOUS at 02:03

## 2018-03-22 RX ADMIN — CEFAZOLIN SODIUM 2 G: 2 SOLUTION INTRAVENOUS at 05:00

## 2018-03-22 RX ADMIN — HYDROCODONE BITARTRATE AND ACETAMINOPHEN 2 TABLET: 10; 325 TABLET ORAL at 08:50

## 2018-03-22 RX ADMIN — ASPIRIN 325 MG: 325 TABLET, DELAYED RELEASE ORAL at 08:49

## 2018-03-22 RX ADMIN — HYDROCODONE BITARTRATE AND ACETAMINOPHEN 2 TABLET: 5; 325 TABLET ORAL at 02:03

## 2018-03-22 RX ADMIN — MORPHINE SULFATE 2 MG: 2 INJECTION, SOLUTION INTRAMUSCULAR; INTRAVENOUS at 04:57

## 2018-03-22 RX ADMIN — FLUOXETINE 40 MG: 20 CAPSULE ORAL at 08:49

## 2018-03-22 RX ADMIN — DOCUSATE SODIUM 100 MG: 100 CAPSULE, LIQUID FILLED ORAL at 08:49

## 2018-03-22 ASSESSMENT — PAIN SCALES - GENERAL
PAINLEVEL_OUTOF10: 7
PAINLEVEL_OUTOF10: 0
PAINLEVEL_OUTOF10: 0
PAINLEVEL_OUTOF10: 5
PAINLEVEL_OUTOF10: 7

## 2018-03-22 ASSESSMENT — PAIN DESCRIPTION - DESCRIPTORS
DESCRIPTORS: ACHING;DISCOMFORT
DESCRIPTORS: ACHING;CONSTANT;DISCOMFORT
DESCRIPTORS: ACHING;DISCOMFORT

## 2018-03-22 ASSESSMENT — PAIN DESCRIPTION - ORIENTATION
ORIENTATION: LEFT

## 2018-03-22 ASSESSMENT — PAIN DESCRIPTION - PAIN TYPE
TYPE: SURGICAL PAIN

## 2018-03-22 ASSESSMENT — PAIN DESCRIPTION - LOCATION
LOCATION: ANKLE

## 2018-03-22 NOTE — OP NOTE
and plantarflexion of 50 degrees. Final x-ray has demonstrated  excellent alignment on both AP and lateral views. Tourniquet was now deflated. The wound was copiously irrigated with  saline, and the retinaculum over the extensor tendons was repaired over a  medium-sized Hemovac drain using 2-0 Vicryl suture. Subcutaneous tissue  was closed with 3-0 Vicryl and skin with 4-0 nylon. No arterial bleeding  was noted. Following closure, the limb was again exsanguinated and thigh tourniquet  re-inflated to 250 mmHg. A dorsal longitudinal incision was then made over  the first metatarsophalangeal joint. Extensor hallucis longus was  identified, retracted, and protected throughout the procedure. Joint  capsule was opened in line with the skin incision and elevated medially and  laterally. Extremely large dorsal osteophytes on the metatarsal head and  base of the phalanx were removed with a sagittal saw. Remaining cartilage  was intact, but was not of normal quality. Passive motion of the joint  showed no impingement. Additional osteophytes were removed from the dorsal  medial aspect of the phalanx and metatarsal head. The joint was flushed and bone wax placed in the cut bone surface. The  capsule was repaired using 2-0 Vicryl suture, and the tourniquet again was  deflated. The wound was irrigated and closed using 3-0 Vicryl for  subcutaneous tissue and 4-0 nylon for skin. A sterile dressing was then  applied consisting of Adaptic, 4x4s, Kerlix, Webril, and well-padded  posterior splint with the ankle in neutral position. The patient tolerated  the procedure well and was taken to the Postanesthesia Care Unit in stable  condition.         Anais Anderson MD    D: 03/21/2018 16:41:01       T: 03/21/2018 22:58:49     [email or web address]  Job#: [id number]     Doc#: [id number]    CC:

## 2018-03-22 NOTE — PROGRESS NOTES
[facility] Quality Flow/Interdisciplinary Rounds Progress Note        Quality Flow Rounds held on March 22, 2018    Disciplines Attending:  Bedside Nurse, ,  and Nursing Unit Leadership    James Hammer was admitted on 3/21/2018 10:19 AM    Anticipated Discharge Date:  Expected Discharge Date: 03/23/18    Disposition:    Nikos Score:  Nikos Scale Score: 21    Readmission Risk              Readmission Risk:        5.25       Age 72 or Greater:  0    Admitted from SNF or Requires Paid or Family Care:  0    Currently has CHF,COPD,ARF,CRI,or is on dialysis:  0    Takes more than 5 Prescription Medications:  4    Takes Digoxin,Insulin,Anticoagulants,Narcotics or ASA/Plavix:  [address] in Past 12 Months:  0    On Disability:  0    Patient Considers own Health:  1.25          Discussed patient goal for the day, patient clinical progression, and barriers to discharge.   The following Goal(s) of the Day/Commitment(s) have been identified:  Pain control/discharge      Magnus Torres  March 22, 2018

## 2018-03-22 NOTE — PROGRESS NOTES
Patient expressed need to void, so I suggested we attempt to get up to bsc with walker and patient stated that she didn't want to get up yet. Educated patient on need to start mobilization after surgery. Explained that she was non-weight bearing to the operative foot but was able to use walker to assist. Pt still refused to get up at this time.

## 2018-03-22 NOTE — PROGRESS NOTES
Physical Therapy    Facility/Department: [facility] SURGERY  Initial Assessment    NAME: Rebeka See  : 1960  MRN: [id number]    Date of Service: 3/22/2018    Patient Diagnosis(es): The primary encounter diagnosis was Arthritis of left ankle. Diagnoses of Pain, Hallux rigidus of left foot, and Arthritis of ankle, left were also pertinent to this visit. has a past medical history of Anxiety; Arthritis; Asthma; Depression; Diverticulitis; History of stomach ulcers; IBS (irritable bowel syndrome); and PTSD (post-traumatic stress disorder). has a past surgical history that includes Carpal tunnel release; shoulder surgery; Ankle surgery; Tonsillectomy; Tubal ligation; Knee arthroscopy; Bunionectomy; Foot neuroma surgery; Upper gastrointestinal endoscopy (2016); other surgical history; and joint replacement (Left, 2018). Evaluating Therapist: Asetr Stevens PT         Room #: 059   DIAGNOSIS:  L ankle arthritis s/p L TAR     PRECAUTIONS: falls, L LE NWB/TTWB     Social:  Pt lives [place]  in a  1  floor plan  3  steps and  1 rails to enter. Prior to admission pt walked with  No AD. Has crutches and knee scooter      Initial Evaluation  Date:  3/22/18 Treatment      Short Term/ Long Term   Goals   Was pt agreeable to Eval/treatment? yes      Does pt have pain? L ankle      Bed Mobility  Rolling:  NT   Supine to sit: Independent   Sit to supine:  NT   Scooting:  Independent in sit    independent    Transfers Sit to stand:  SBA   Stand to sit:  SBA   Stand pivot:  SBA   Independent    Ambulation    7  feet with ww  with  L LE NWB SBA . See comments   10-20  feet with  ww  with  L LE NWB independent        Stair negotiation: ascended and descended  2 steps with 1 rail with 1 crutch min assist, descended backwards.  Used L LE TTWB on steps    3-4  steps with  1  rail with  1 crutch CGA/min assist    LE ROM  WFL , L ankle NT      LE strength  WFL , L ankle NT      AM- PAC RAW score    Pt is alert and Oriented x 3     Balance: SBA with minimal gait     Endurance: decreased   Chair alarm:  no     ASSESSMENT  Pt displays functional ability as noted in the objective portion of this evaluation. Comments/Treatment:  Pt used knee scooter in hallway x 200 feet. Was independent in use of scooter. Pt reports she will be using the knee scooter at home. Did maintain L LE NWB with short gait distance        Examination of body systems Decreased   Functional mobility x   ROM    Strength    Safety Awareness    Cognition    Endurance    Sensation    Balance x   Vision/Visual Deficits    Coordination        Patient education  Pt educated on  Fall risk, L LE NWB/TTWB    Patient response to education:   Pt verbalized understanding Pt demonstrated skill Pt requires further education in this area   x x x     Rehab potential is Good for reaching above PT goals. Pts/ family goals   1. Home     Patient and or family understand(s) diagnosis, prognosis, and plan of care. -  Yes     PLAN  PT care will be provided in accordance with the objectives noted above. Whenever appropriate, clear delegation orders will be provided for nursing staff. Exercises and functional mobility practice will be used as well as appropriate assistive devices or modalities to obtain goals. Patient and family education will also be administered as needed. Frequency of treatments will be daiyl x 3 days.     Time in:  0816  Time out: 1000 [facility],  [address] [place] [address] number:  PT [id number]

## 2018-06-14 LAB
ALBUMIN SERPL-MCNC: NORMAL G/DL
ALP BLD-CCNC: NORMAL U/L
ALT SERPL-CCNC: NORMAL U/L
ANION GAP SERPL CALCULATED.3IONS-SCNC: NORMAL MMOL/L
AST SERPL-CCNC: NORMAL U/L
BILIRUB SERPL-MCNC: NORMAL MG/DL
BUN BLDV-MCNC: NORMAL MG/DL
CALCIUM SERPL-MCNC: NORMAL MG/DL
CHLORIDE BLD-SCNC: NORMAL MMOL/L
CHOLESTEROL, TOTAL: NORMAL
CHOLESTEROL/HDL RATIO: NORMAL
CO2: NORMAL
CREAT SERPL-MCNC: NORMAL MG/DL
GFR CALCULATED: NORMAL
GLUCOSE BLD-MCNC: NORMAL MG/DL
HDLC SERPL-MCNC: NORMAL MG/DL
LDL CHOLESTEROL CALCULATED: NORMAL
POTASSIUM SERPL-SCNC: NORMAL MMOL/L
SODIUM BLD-SCNC: NORMAL MMOL/L
TOTAL PROTEIN: NORMAL
TRIGL SERPL-MCNC: NORMAL MG/DL
VLDLC SERPL CALC-MCNC: NORMAL MG/DL

## 2018-11-27 ENCOUNTER — ANESTHESIA EVENT (OUTPATIENT)
Dept: OPERATING ROOM | Age: 58
End: 2018-11-27
Payer: MEDICARE

## 2019-02-20 ENCOUNTER — APPOINTMENT (OUTPATIENT)
Dept: GENERAL RADIOLOGY | Age: 59
End: 2019-02-20
Attending: ORTHOPAEDIC SURGERY
Payer: MEDICARE

## 2019-02-20 ENCOUNTER — ANESTHESIA (OUTPATIENT)
Dept: OPERATING ROOM | Age: 59
End: 2019-02-20
Payer: MEDICARE

## 2019-02-20 ENCOUNTER — HOSPITAL ENCOUNTER (OUTPATIENT)
Age: 59
Setting detail: OBSERVATION
Discharge: HOME | End: 2019-02-21
Attending: ORTHOPAEDIC SURGERY | Admitting: ORTHOPAEDIC SURGERY
Payer: MEDICARE

## 2019-02-20 VITALS — TEMPERATURE: 98.6 F | DIASTOLIC BLOOD PRESSURE: 67 MMHG | SYSTOLIC BLOOD PRESSURE: 114 MMHG | OXYGEN SATURATION: 98 %

## 2019-02-20 DIAGNOSIS — M06.9 RHEUMATOID ARTHRITIS INVOLVING RIGHT ANKLE, UNSPECIFIED RHEUMATOID FACTOR PRESENCE: ICD-10-CM

## 2019-02-20 DIAGNOSIS — G89.18 CHEST WALL PAIN FOLLOWING SURGERY: Primary | ICD-10-CM

## 2019-02-20 DIAGNOSIS — R07.89 CHEST WALL PAIN FOLLOWING SURGERY: Primary | ICD-10-CM

## 2019-02-20 LAB
HCT VFR BLD CALC: 38.9 % (ref 34–48)
HEMOGLOBIN: 12.6 G/DL (ref 11.5–15.5)
MCH RBC QN AUTO: 28.6 PG (ref 26–35)
MCHC RBC AUTO-ENTMCNC: 32.4 % (ref 32–34.5)
MCV RBC AUTO: 88.4 FL (ref 80–99.9)
PDW BLD-RTO: 14.6 FL (ref 11.5–15)
PLATELET # BLD: 188 E9/L (ref 130–450)
PMV BLD AUTO: 11.9 FL (ref 7–12)
RBC # BLD: 4.4 E12/L (ref 3.5–5.5)
WBC # BLD: 5.8 E9/L (ref 4.5–11.5)

## 2019-02-20 PROCEDURE — 2580000003 HC RX 258

## 2019-02-20 PROCEDURE — 3700000000 HC ANESTHESIA ATTENDED CARE: Performed by: ORTHOPAEDIC SURGERY

## 2019-02-20 PROCEDURE — 88311 DECALCIFY TISSUE: CPT

## 2019-02-20 PROCEDURE — 3600000004 HC SURGERY LEVEL 4 BASE: Performed by: ORTHOPAEDIC SURGERY

## 2019-02-20 PROCEDURE — 2500000003 HC RX 250 WO HCPCS: Performed by: NURSE ANESTHETIST, CERTIFIED REGISTERED

## 2019-02-20 PROCEDURE — G0378 HOSPITAL OBSERVATION PER HR: HCPCS

## 2019-02-20 PROCEDURE — 2580000003 HC RX 258: Performed by: ORTHOPAEDIC SURGERY

## 2019-02-20 PROCEDURE — 87081 CULTURE SCREEN ONLY: CPT

## 2019-02-20 PROCEDURE — C1713 ANCHOR/SCREW BN/BN,TIS/BN: HCPCS | Performed by: ORTHOPAEDIC SURGERY

## 2019-02-20 PROCEDURE — 64445 NJX AA&/STRD SCIATIC NRV IMG: CPT | Performed by: ANESTHESIOLOGY

## 2019-02-20 PROCEDURE — 6360000002 HC RX W HCPCS: Performed by: PODIATRIST

## 2019-02-20 PROCEDURE — 3600000014 HC SURGERY LEVEL 4 ADDTL 15MIN: Performed by: ORTHOPAEDIC SURGERY

## 2019-02-20 PROCEDURE — C1776 JOINT DEVICE (IMPLANTABLE): HCPCS | Performed by: ORTHOPAEDIC SURGERY

## 2019-02-20 PROCEDURE — 2720000010 HC SURG SUPPLY STERILE: Performed by: ORTHOPAEDIC SURGERY

## 2019-02-20 PROCEDURE — 7100000001 HC PACU RECOVERY - ADDTL 15 MIN: Performed by: ORTHOPAEDIC SURGERY

## 2019-02-20 PROCEDURE — 88304 TISSUE EXAM BY PATHOLOGIST: CPT

## 2019-02-20 PROCEDURE — 6360000002 HC RX W HCPCS

## 2019-02-20 PROCEDURE — 36415 COLL VENOUS BLD VENIPUNCTURE: CPT

## 2019-02-20 PROCEDURE — C1729 CATH, DRAINAGE: HCPCS | Performed by: ORTHOPAEDIC SURGERY

## 2019-02-20 PROCEDURE — 2709999900 HC NON-CHARGEABLE SUPPLY: Performed by: ORTHOPAEDIC SURGERY

## 2019-02-20 PROCEDURE — 85027 COMPLETE CBC AUTOMATED: CPT

## 2019-02-20 PROCEDURE — 3209999900 FLUORO FOR SURGICAL PROCEDURES

## 2019-02-20 PROCEDURE — 7100000000 HC PACU RECOVERY - FIRST 15 MIN: Performed by: ORTHOPAEDIC SURGERY

## 2019-02-20 PROCEDURE — 6370000000 HC RX 637 (ALT 250 FOR IP): Performed by: ORTHOPAEDIC SURGERY

## 2019-02-20 PROCEDURE — 6360000002 HC RX W HCPCS: Performed by: ORTHOPAEDIC SURGERY

## 2019-02-20 PROCEDURE — 6360000002 HC RX W HCPCS: Performed by: NURSE ANESTHETIST, CERTIFIED REGISTERED

## 2019-02-20 PROCEDURE — 1200000000 HC SEMI PRIVATE

## 2019-02-20 PROCEDURE — 2500000003 HC RX 250 WO HCPCS: Performed by: ORTHOPAEDIC SURGERY

## 2019-02-20 PROCEDURE — 2500000003 HC RX 250 WO HCPCS

## 2019-02-20 PROCEDURE — 3700000001 HC ADD 15 MINUTES (ANESTHESIA): Performed by: ORTHOPAEDIC SURGERY

## 2019-02-20 DEVICE — IMPLANTABLE DEVICE
Type: IMPLANTABLE DEVICE | Site: ANKLE | Status: FUNCTIONAL
Brand: INFINITY

## 2019-02-20 DEVICE — CEMENT BNE 40 GM RADIOPAQUE BA SIMPLEX P: Type: IMPLANTABLE DEVICE | Site: ANKLE | Status: FUNCTIONAL

## 2019-02-20 RX ORDER — ALPRAZOLAM 0.25 MG/1
0.5 TABLET ORAL PRN
Status: DISCONTINUED | OUTPATIENT
Start: 2019-02-20 | End: 2019-02-20

## 2019-02-20 RX ORDER — ZOLPIDEM TARTRATE 5 MG/1
5 TABLET ORAL NIGHTLY PRN
Status: DISCONTINUED | OUTPATIENT
Start: 2019-02-20 | End: 2019-02-21 | Stop reason: HOSPADM

## 2019-02-20 RX ORDER — NEOSTIGMINE METHYLSULFATE 1 MG/ML
INJECTION, SOLUTION INTRAVENOUS PRN
Status: DISCONTINUED | OUTPATIENT
Start: 2019-02-20 | End: 2019-02-20 | Stop reason: SDUPTHER

## 2019-02-20 RX ORDER — ROPIVACAINE HYDROCHLORIDE 5 MG/ML
INJECTION, SOLUTION EPIDURAL; INFILTRATION; PERINEURAL
Status: DISCONTINUED
Start: 2019-02-20 | End: 2019-02-20

## 2019-02-20 RX ORDER — DOCUSATE SODIUM 100 MG/1
100 CAPSULE, LIQUID FILLED ORAL 2 TIMES DAILY
Status: DISCONTINUED | OUTPATIENT
Start: 2019-02-20 | End: 2019-02-21 | Stop reason: HOSPADM

## 2019-02-20 RX ORDER — BUPIVACAINE HYDROCHLORIDE 5 MG/ML
INJECTION, SOLUTION EPIDURAL; INTRACAUDAL PRN
Status: DISCONTINUED | OUTPATIENT
Start: 2019-02-20 | End: 2019-02-20 | Stop reason: ALTCHOICE

## 2019-02-20 RX ORDER — ROPIVACAINE HYDROCHLORIDE 5 MG/ML
INJECTION, SOLUTION EPIDURAL; INFILTRATION; PERINEURAL
Status: COMPLETED
Start: 2019-02-20 | End: 2019-02-20

## 2019-02-20 RX ORDER — ROPIVACAINE HYDROCHLORIDE 5 MG/ML
40 INJECTION, SOLUTION EPIDURAL; INFILTRATION; PERINEURAL ONCE
Status: COMPLETED | OUTPATIENT
Start: 2019-02-20 | End: 2019-02-20

## 2019-02-20 RX ORDER — ROCURONIUM BROMIDE 10 MG/ML
INJECTION, SOLUTION INTRAVENOUS PRN
Status: DISCONTINUED | OUTPATIENT
Start: 2019-02-20 | End: 2019-02-20 | Stop reason: SDUPTHER

## 2019-02-20 RX ORDER — MIDAZOLAM HYDROCHLORIDE 1 MG/ML
INJECTION INTRAMUSCULAR; INTRAVENOUS
Status: COMPLETED
Start: 2019-02-20 | End: 2019-02-20

## 2019-02-20 RX ORDER — CEFAZOLIN SODIUM 2 G/50ML
2 SOLUTION INTRAVENOUS EVERY 8 HOURS
Status: DISCONTINUED | OUTPATIENT
Start: 2019-02-20 | End: 2019-02-20 | Stop reason: SDUPTHER

## 2019-02-20 RX ORDER — CEFAZOLIN SODIUM 2 G/50ML
2 SOLUTION INTRAVENOUS EVERY 8 HOURS
Status: COMPLETED | OUTPATIENT
Start: 2019-02-20 | End: 2019-02-21

## 2019-02-20 RX ORDER — GLYCOPYRROLATE 0.2 MG/ML
INJECTION INTRAMUSCULAR; INTRAVENOUS PRN
Status: DISCONTINUED | OUTPATIENT
Start: 2019-02-20 | End: 2019-02-20 | Stop reason: SDUPTHER

## 2019-02-20 RX ORDER — MIDAZOLAM HYDROCHLORIDE 1 MG/ML
1 INJECTION INTRAMUSCULAR; INTRAVENOUS EVERY 5 MIN PRN
Status: DISCONTINUED | OUTPATIENT
Start: 2019-02-20 | End: 2019-02-20 | Stop reason: HOSPADM

## 2019-02-20 RX ORDER — SODIUM CHLORIDE 9 MG/ML
INJECTION, SOLUTION INTRAVENOUS CONTINUOUS
Status: DISCONTINUED | OUTPATIENT
Start: 2019-02-20 | End: 2019-02-21 | Stop reason: HOSPADM

## 2019-02-20 RX ORDER — SODIUM CHLORIDE 0.9 % (FLUSH) 0.9 %
10 SYRINGE (ML) INJECTION EVERY 12 HOURS SCHEDULED
Status: DISCONTINUED | OUTPATIENT
Start: 2019-02-20 | End: 2019-02-21 | Stop reason: HOSPADM

## 2019-02-20 RX ORDER — FENTANYL CITRATE 50 UG/ML
50 INJECTION, SOLUTION INTRAMUSCULAR; INTRAVENOUS EVERY 5 MIN PRN
Status: DISCONTINUED | OUTPATIENT
Start: 2019-02-20 | End: 2019-02-20 | Stop reason: HOSPADM

## 2019-02-20 RX ORDER — MORPHINE SULFATE 2 MG/ML
4 INJECTION, SOLUTION INTRAMUSCULAR; INTRAVENOUS
Status: DISCONTINUED | OUTPATIENT
Start: 2019-02-20 | End: 2019-02-21 | Stop reason: HOSPADM

## 2019-02-20 RX ORDER — DEXAMETHASONE SODIUM PHOSPHATE 4 MG/ML
INJECTION, SOLUTION INTRA-ARTICULAR; INTRALESIONAL; INTRAMUSCULAR; INTRAVENOUS; SOFT TISSUE PRN
Status: DISCONTINUED | OUTPATIENT
Start: 2019-02-20 | End: 2019-02-20 | Stop reason: SDUPTHER

## 2019-02-20 RX ORDER — ALPRAZOLAM 0.25 MG/1
0.5 TABLET ORAL EVERY 12 HOURS PRN
Status: DISCONTINUED | OUTPATIENT
Start: 2019-02-20 | End: 2019-02-21 | Stop reason: HOSPADM

## 2019-02-20 RX ORDER — PROPOFOL 10 MG/ML
INJECTION, EMULSION INTRAVENOUS PRN
Status: DISCONTINUED | OUTPATIENT
Start: 2019-02-20 | End: 2019-02-20 | Stop reason: SDUPTHER

## 2019-02-20 RX ORDER — FENTANYL CITRATE 50 UG/ML
INJECTION, SOLUTION INTRAMUSCULAR; INTRAVENOUS
Status: COMPLETED
Start: 2019-02-20 | End: 2019-02-20

## 2019-02-20 RX ORDER — LIDOCAINE HYDROCHLORIDE 10 MG/ML
INJECTION, SOLUTION INFILTRATION; PERINEURAL
Status: DISCONTINUED
Start: 2019-02-20 | End: 2019-02-20 | Stop reason: WASHOUT

## 2019-02-20 RX ORDER — MIDAZOLAM HYDROCHLORIDE 1 MG/ML
INJECTION INTRAMUSCULAR; INTRAVENOUS PRN
Status: DISCONTINUED | OUTPATIENT
Start: 2019-02-20 | End: 2019-02-20 | Stop reason: SDUPTHER

## 2019-02-20 RX ORDER — MORPHINE SULFATE 2 MG/ML
2 INJECTION, SOLUTION INTRAMUSCULAR; INTRAVENOUS
Status: DISCONTINUED | OUTPATIENT
Start: 2019-02-20 | End: 2019-02-21 | Stop reason: HOSPADM

## 2019-02-20 RX ORDER — SODIUM CHLORIDE 9 MG/ML
INJECTION, SOLUTION INTRAVENOUS CONTINUOUS PRN
Status: DISCONTINUED | OUTPATIENT
Start: 2019-02-20 | End: 2019-02-20 | Stop reason: SDUPTHER

## 2019-02-20 RX ORDER — MEPERIDINE HYDROCHLORIDE 25 MG/ML
12.5 INJECTION INTRAMUSCULAR; INTRAVENOUS; SUBCUTANEOUS EVERY 5 MIN PRN
Status: DISCONTINUED | OUTPATIENT
Start: 2019-02-20 | End: 2019-02-20 | Stop reason: HOSPADM

## 2019-02-20 RX ORDER — MORPHINE SULFATE 2 MG/ML
2 INJECTION, SOLUTION INTRAMUSCULAR; INTRAVENOUS EVERY 5 MIN PRN
Status: DISCONTINUED | OUTPATIENT
Start: 2019-02-20 | End: 2019-02-20 | Stop reason: HOSPADM

## 2019-02-20 RX ORDER — HYDROCODONE BITARTRATE AND ACETAMINOPHEN 5; 325 MG/1; MG/1
2 TABLET ORAL EVERY 4 HOURS PRN
Status: DISCONTINUED | OUTPATIENT
Start: 2019-02-20 | End: 2019-02-21 | Stop reason: HOSPADM

## 2019-02-20 RX ORDER — LIDOCAINE HYDROCHLORIDE 20 MG/ML
INJECTION, SOLUTION INFILTRATION; PERINEURAL PRN
Status: DISCONTINUED | OUTPATIENT
Start: 2019-02-20 | End: 2019-02-20 | Stop reason: SDUPTHER

## 2019-02-20 RX ORDER — SODIUM CHLORIDE 0.9 % (FLUSH) 0.9 %
10 SYRINGE (ML) INJECTION PRN
Status: DISCONTINUED | OUTPATIENT
Start: 2019-02-20 | End: 2019-02-21 | Stop reason: HOSPADM

## 2019-02-20 RX ORDER — FENTANYL CITRATE 50 UG/ML
INJECTION, SOLUTION INTRAMUSCULAR; INTRAVENOUS PRN
Status: DISCONTINUED | OUTPATIENT
Start: 2019-02-20 | End: 2019-02-20 | Stop reason: SDUPTHER

## 2019-02-20 RX ORDER — FLUOXETINE HYDROCHLORIDE 20 MG/1
60 CAPSULE ORAL DAILY
Status: DISCONTINUED | OUTPATIENT
Start: 2019-02-21 | End: 2019-02-21 | Stop reason: HOSPADM

## 2019-02-20 RX ORDER — PROMETHAZINE HYDROCHLORIDE 25 MG/ML
6.25 INJECTION, SOLUTION INTRAMUSCULAR; INTRAVENOUS
Status: DISCONTINUED | OUTPATIENT
Start: 2019-02-20 | End: 2019-02-20 | Stop reason: HOSPADM

## 2019-02-20 RX ORDER — ONDANSETRON 2 MG/ML
4 INJECTION INTRAMUSCULAR; INTRAVENOUS EVERY 6 HOURS PRN
Status: DISCONTINUED | OUTPATIENT
Start: 2019-02-20 | End: 2019-02-21 | Stop reason: HOSPADM

## 2019-02-20 RX ORDER — ONDANSETRON 2 MG/ML
4 INJECTION INTRAMUSCULAR; INTRAVENOUS
Status: DISCONTINUED | OUTPATIENT
Start: 2019-02-20 | End: 2019-02-20 | Stop reason: HOSPADM

## 2019-02-20 RX ADMIN — Medication 3 MG: at 16:00

## 2019-02-20 RX ADMIN — SODIUM CHLORIDE: 9 INJECTION, SOLUTION INTRAVENOUS at 18:20

## 2019-02-20 RX ADMIN — MIDAZOLAM HYDROCHLORIDE 2 MG: 1 INJECTION, SOLUTION INTRAMUSCULAR; INTRAVENOUS at 12:32

## 2019-02-20 RX ADMIN — FENTANYL CITRATE 100 MCG: 50 INJECTION, SOLUTION INTRAMUSCULAR; INTRAVENOUS at 12:32

## 2019-02-20 RX ADMIN — GLYCOPYRROLATE 0.6 MG: 0.2 INJECTION, SOLUTION INTRAMUSCULAR; INTRAVENOUS at 16:00

## 2019-02-20 RX ADMIN — FENTANYL CITRATE 100 MCG: 50 INJECTION, SOLUTION INTRAMUSCULAR; INTRAVENOUS at 15:27

## 2019-02-20 RX ADMIN — SODIUM CHLORIDE: 9 INJECTION, SOLUTION INTRAVENOUS at 13:22

## 2019-02-20 RX ADMIN — DOCUSATE SODIUM 100 MG: 100 CAPSULE, LIQUID FILLED ORAL at 21:11

## 2019-02-20 RX ADMIN — LIDOCAINE HYDROCHLORIDE 60 MG: 20 INJECTION, SOLUTION INFILTRATION; PERINEURAL at 13:28

## 2019-02-20 RX ADMIN — MIDAZOLAM HYDROCHLORIDE 2 MG: 1 INJECTION INTRAMUSCULAR; INTRAVENOUS at 12:32

## 2019-02-20 RX ADMIN — MIDAZOLAM HYDROCHLORIDE 2 MG: 1 INJECTION, SOLUTION INTRAMUSCULAR; INTRAVENOUS at 13:22

## 2019-02-20 RX ADMIN — CEFAZOLIN SODIUM 2 G: 2 SOLUTION INTRAVENOUS at 21:11

## 2019-02-20 RX ADMIN — ROPIVACAINE HYDROCHLORIDE 40 ML: 5 INJECTION, SOLUTION EPIDURAL; INFILTRATION; PERINEURAL at 12:44

## 2019-02-20 RX ADMIN — SODIUM CHLORIDE: 9 INJECTION, SOLUTION INTRAVENOUS at 14:27

## 2019-02-20 RX ADMIN — DEXAMETHASONE SODIUM PHOSPHATE 10 MG: 4 INJECTION, SOLUTION INTRAMUSCULAR; INTRAVENOUS at 13:40

## 2019-02-20 RX ADMIN — PROPOFOL 150 MG: 10 INJECTION, EMULSION INTRAVENOUS at 13:28

## 2019-02-20 RX ADMIN — CEFAZOLIN SODIUM 2 G: 2 SOLUTION INTRAVENOUS at 13:33

## 2019-02-20 RX ADMIN — PROPOFOL 50 MG: 10 INJECTION, EMULSION INTRAVENOUS at 15:25

## 2019-02-20 RX ADMIN — FENTANYL CITRATE 100 MCG: 50 INJECTION, SOLUTION INTRAMUSCULAR; INTRAVENOUS at 13:28

## 2019-02-20 RX ADMIN — ROCURONIUM BROMIDE 30 MG: 10 SOLUTION INTRAVENOUS at 13:28

## 2019-02-20 RX ADMIN — PROPOFOL 50 MG: 10 INJECTION, EMULSION INTRAVENOUS at 16:00

## 2019-02-20 ASSESSMENT — PULMONARY FUNCTION TESTS
PIF_VALUE: 29
PIF_VALUE: 24
PIF_VALUE: 22
PIF_VALUE: 24
PIF_VALUE: 25
PIF_VALUE: 4
PIF_VALUE: 27
PIF_VALUE: 9
PIF_VALUE: 29
PIF_VALUE: 28
PIF_VALUE: 24
PIF_VALUE: 23
PIF_VALUE: 24
PIF_VALUE: 30
PIF_VALUE: 24
PIF_VALUE: 24
PIF_VALUE: 27
PIF_VALUE: 25
PIF_VALUE: 24
PIF_VALUE: 3
PIF_VALUE: 24
PIF_VALUE: 4
PIF_VALUE: 24
PIF_VALUE: 23
PIF_VALUE: 28
PIF_VALUE: 23
PIF_VALUE: 29
PIF_VALUE: 24
PIF_VALUE: 22
PIF_VALUE: 25
PIF_VALUE: 29
PIF_VALUE: 24
PIF_VALUE: 29
PIF_VALUE: 28
PIF_VALUE: 25
PIF_VALUE: 23
PIF_VALUE: 14
PIF_VALUE: 1
PIF_VALUE: 25
PIF_VALUE: 25
PIF_VALUE: 29
PIF_VALUE: 25
PIF_VALUE: 25
PIF_VALUE: 24
PIF_VALUE: 14
PIF_VALUE: 23
PIF_VALUE: 29
PIF_VALUE: 24
PIF_VALUE: 25
PIF_VALUE: 24
PIF_VALUE: 25
PIF_VALUE: 24
PIF_VALUE: 2
PIF_VALUE: 24
PIF_VALUE: 29
PIF_VALUE: 24
PIF_VALUE: 25
PIF_VALUE: 23
PIF_VALUE: 25
PIF_VALUE: 24
PIF_VALUE: 28
PIF_VALUE: 24
PIF_VALUE: 24
PIF_VALUE: 26
PIF_VALUE: 28
PIF_VALUE: 24
PIF_VALUE: 25
PIF_VALUE: 27
PIF_VALUE: 27
PIF_VALUE: 4
PIF_VALUE: 24
PIF_VALUE: 29
PIF_VALUE: 24
PIF_VALUE: 26
PIF_VALUE: 24
PIF_VALUE: 28
PIF_VALUE: 25
PIF_VALUE: 1
PIF_VALUE: 15
PIF_VALUE: 24
PIF_VALUE: 1
PIF_VALUE: 29
PIF_VALUE: 2
PIF_VALUE: 25
PIF_VALUE: 0
PIF_VALUE: 28
PIF_VALUE: 29
PIF_VALUE: 25
PIF_VALUE: 27
PIF_VALUE: 24
PIF_VALUE: 24
PIF_VALUE: 25
PIF_VALUE: 25
PIF_VALUE: 24
PIF_VALUE: 24
PIF_VALUE: 30
PIF_VALUE: 17
PIF_VALUE: 29
PIF_VALUE: 29
PIF_VALUE: 28
PIF_VALUE: 24
PIF_VALUE: 24
PIF_VALUE: 1
PIF_VALUE: 26
PIF_VALUE: 28
PIF_VALUE: 24
PIF_VALUE: 29
PIF_VALUE: 24
PIF_VALUE: 4
PIF_VALUE: 24
PIF_VALUE: 9
PIF_VALUE: 24
PIF_VALUE: 29
PIF_VALUE: 25
PIF_VALUE: 1
PIF_VALUE: 24
PIF_VALUE: 28
PIF_VALUE: 24
PIF_VALUE: 1
PIF_VALUE: 24
PIF_VALUE: 28
PIF_VALUE: 27
PIF_VALUE: 25
PIF_VALUE: 27
PIF_VALUE: 23
PIF_VALUE: 24
PIF_VALUE: 25
PIF_VALUE: 29
PIF_VALUE: 23
PIF_VALUE: 27
PIF_VALUE: 24
PIF_VALUE: 5
PIF_VALUE: 28
PIF_VALUE: 24
PIF_VALUE: 24
PIF_VALUE: 23
PIF_VALUE: 28
PIF_VALUE: 1
PIF_VALUE: 25
PIF_VALUE: 4
PIF_VALUE: 24

## 2019-02-20 ASSESSMENT — PAIN DESCRIPTION - PAIN TYPE: TYPE: SURGICAL PAIN

## 2019-02-20 ASSESSMENT — PAIN SCALES - GENERAL
PAINLEVEL_OUTOF10: 0

## 2019-02-21 VITALS
TEMPERATURE: 97.7 F | SYSTOLIC BLOOD PRESSURE: 113 MMHG | HEIGHT: 64 IN | OXYGEN SATURATION: 96 % | DIASTOLIC BLOOD PRESSURE: 66 MMHG | HEART RATE: 71 BPM | RESPIRATION RATE: 16 BRPM | BODY MASS INDEX: 31.58 KG/M2 | WEIGHT: 185 LBS

## 2019-02-21 PROBLEM — M06.9 RHEUMATOID ARTHRITIS INVOLVING ANKLE (HCC): Status: ACTIVE | Noted: 2019-02-21

## 2019-02-21 PROBLEM — M19.90 OSTEOARTHRITIS: Status: ACTIVE | Noted: 2019-02-21

## 2019-02-21 LAB — MRSA CULTURE ONLY: NORMAL

## 2019-02-21 PROCEDURE — 2580000003 HC RX 258: Performed by: ORTHOPAEDIC SURGERY

## 2019-02-21 PROCEDURE — 6360000002 HC RX W HCPCS: Performed by: ORTHOPAEDIC SURGERY

## 2019-02-21 PROCEDURE — 6370000000 HC RX 637 (ALT 250 FOR IP): Performed by: ORTHOPAEDIC SURGERY

## 2019-02-21 PROCEDURE — 97161 PT EVAL LOW COMPLEX 20 MIN: CPT

## 2019-02-21 PROCEDURE — G0378 HOSPITAL OBSERVATION PER HR: HCPCS

## 2019-02-21 PROCEDURE — 97530 THERAPEUTIC ACTIVITIES: CPT

## 2019-02-21 PROCEDURE — 76942 ECHO GUIDE FOR BIOPSY: CPT | Performed by: ANESTHESIOLOGY

## 2019-02-21 RX ORDER — HYDROCODONE BITARTRATE AND ACETAMINOPHEN 5; 325 MG/1; MG/1
1 TABLET ORAL EVERY 4 HOURS PRN
Qty: 42 TABLET | Refills: 0 | Status: SHIPPED | OUTPATIENT
Start: 2019-02-21 | End: 2019-02-28

## 2019-02-21 RX ADMIN — ZOLPIDEM TARTRATE 5 MG: 5 TABLET ORAL at 00:20

## 2019-02-21 RX ADMIN — SODIUM CHLORIDE: 9 INJECTION, SOLUTION INTRAVENOUS at 05:46

## 2019-02-21 RX ADMIN — DOCUSATE SODIUM 100 MG: 100 CAPSULE, LIQUID FILLED ORAL at 09:17

## 2019-02-21 RX ADMIN — CEFAZOLIN SODIUM 2 G: 2 SOLUTION INTRAVENOUS at 05:44

## 2019-02-21 RX ADMIN — FLUOXETINE 60 MG: 20 CAPSULE ORAL at 09:17

## 2019-02-21 ASSESSMENT — PAIN SCALES - GENERAL: PAINLEVEL_OUTOF10: 0

## 2019-10-21 ENCOUNTER — TELEPHONE (OUTPATIENT)
Dept: PRIMARY CARE CLINIC | Age: 59
End: 2019-10-21

## 2019-10-23 ENCOUNTER — HOSPITAL ENCOUNTER (OUTPATIENT)
Age: 59
Discharge: HOME | End: 2019-10-25
Payer: MEDICARE

## 2019-10-23 PROCEDURE — 88304 TISSUE EXAM BY PATHOLOGIST: CPT

## 2019-12-31 ENCOUNTER — OFFICE VISIT (OUTPATIENT)
Dept: PRIMARY CARE CLINIC | Age: 59
End: 2019-12-31
Payer: MEDICARE

## 2019-12-31 VITALS
BODY MASS INDEX: 32.1 KG/M2 | DIASTOLIC BLOOD PRESSURE: 82 MMHG | SYSTOLIC BLOOD PRESSURE: 128 MMHG | WEIGHT: 188 LBS | TEMPERATURE: 98.9 F | HEIGHT: 64 IN

## 2019-12-31 PROCEDURE — 99214 OFFICE O/P EST MOD 30 MIN: CPT | Performed by: FAMILY MEDICINE

## 2019-12-31 RX ORDER — CEFDINIR 300 MG/1
300 CAPSULE ORAL 2 TIMES DAILY
Qty: 20 CAPSULE | Refills: 0 | Status: SHIPPED | OUTPATIENT
Start: 2019-12-31 | End: 2020-01-10

## 2019-12-31 RX ORDER — PREDNISONE 10 MG/1
TABLET ORAL
Qty: 18 TABLET | Refills: 0 | Status: SHIPPED
Start: 2019-12-31 | End: 2020-03-04

## 2019-12-31 RX ORDER — MONTELUKAST SODIUM 10 MG/1
10 TABLET ORAL NIGHTLY
Qty: 90 TABLET | Refills: 1 | Status: SHIPPED
Start: 2019-12-31 | End: 2021-05-11 | Stop reason: ALTCHOICE

## 2019-12-31 NOTE — PROGRESS NOTES
Vitamins-Minerals (CENTRUM PO), Take by mouth , Disp: , Rfl:   Allergies   Allergen Reactions    Percocet [Oxycodone-Acetaminophen] Nausea And Vomiting and Other (See Comments)     Felt like \"bugs crawling on skin\"    Percodan [Oxycodone-Aspirin] Nausea And Vomiting     Social History     Socioeconomic History    Marital status:      Spouse name: Not on file    Number of children: Not on file    Years of education: Not on file    Highest education level: Not on file   Occupational History    Not on file   Social Needs    Financial resource strain: Not on file    Food insecurity:     Worry: Not on file     Inability: Not on file    Transportation needs:     Medical: Not on file     Non-medical: Not on file   Tobacco Use    Smoking status: Former Smoker     Packs/day: 0.25     Years: 10.00     Pack years: 2.50     Last attempt to quit: 2010     Years since quittin.1    Smokeless tobacco: Never Used   Substance and Sexual Activity    Alcohol use: Yes     Comment: social    Drug use: No    Sexual activity: Not on file   Lifestyle    Physical activity:     Days per week: Not on file     Minutes per session: Not on file    Stress: Not on file   Relationships    Social connections:     Talks on phone: Not on file     Gets together: Not on file     Attends Roman [employer] service: Not on file     Active member of club or organization: Not on file     Attends meetings of clubs or organizations: Not on file     Relationship status: Not on file    Intimate partner violence:     Fear of current or ex partner: Not on file     Emotionally abused: Not on file     Physically abused: Not on file     Forced sexual activity: Not on file   Other Topics Concern    Not on file   Social History Narrative    [address] Ac[address]#: Calvin Maldonado : 1960 Sex: F Age: 59 years    Subjective    CC: HERE FOR PRE OP    HPI: FOR R FOOT OR DR ANDRADE 2-20 ROS ENG-- PAIN WITH WALKING ON R    FOOT-----INACTIVE PT EF CHOL MD    ROS:    Const: DENIES SYMPTOMS OTHER THAN STATED ABOVE    Eyes: DENIES SUDDEN CHANGE OF VISION    ENMT: DENIES PURULENT DISCHARGE AND PAINFUL LESIONS    CV: DENIES SQUEEZING CHEST PAIN, MASSIVE EDEMA OR CONSTANT PALPITAIONS    Resp: DENIES HEMOPTYSIS    GI: DENIES CONSTANT ABDOMINAL PAIN OR GROSSLY BLOODY DISCHARGBE    : DENIES DYSURIA OR HEMATURIA    Musculo: HPI    Skin: DENIES PURULENT DRAINING RASH    Neuro: DENIES INTRACTABLE HEADACHE, SEIZURE AND SYNCOPE    Psych: DENIES CONSTANT ANXIETY, SEVERE DEPRESSION AND RECURRENT PANIC    Endocrine: DENIES EXTREME SHIFTS IN GLUCOSE AND SEVERE THYROID SYMPTOMS    Hema/Lymph: DENIES GROSS BLEEDING OR CLOTTING SYMPTOMS    Current Meds:Surgery cleared for surgery with dr Allyson Ambriz---    Proair  (90 Base) mcg/Act 2 puffs every 4-6 hours as needed    VSL#3 1 by mouth twice a day    Prozac 20 mg three mouth every day [facility] COUNSELING    Vitamin D-3  one daily    Ambien 10 mg 1 po qhs prn - Dr. Thanh Magdaleno    Vitamin D 2000 Unit 1 po qd    Methotrexate 2.5 mg 4 tablets one time weekly on     Plaquenil 200 mg one twice a day    Vitamin B-12 1000 mcg 1 by mouth every day    Fish Oil Burp-Less 1000 mg take one capsule by mouth twice per day    Calcium 600 600 mg once a day    Tumeric 500 mg 1 daily    Centrum Silver 1 by mouth every day    Allergies: Percocet, IVP Dye    PMH:    Problem List: Intermittent asthma    Acute maxillary sinusitis    Impacted cerumen    Health Maintenance:    Mammogram - (10/7/2010)    Mammogram Screening - (10/7/2010)    DEPRESSION. RIGHT ANKLE SURGERIES TIMES THREE WITH CHRONIC SEVERE PAIN DUE TO INJURY FROM    . FATIGUE. Yesi Jennings  1960 Page #2    WEIGHT GAIN. FAMILY HISTORY OF DIABETES. IRRITABLE BOWEL SYNDROME. DEATH OF A SON 2 YEARS AGO. PROBABLY CARPAL TUNNEL SYNDROME. LEFT POSTERIOR NECK MASS.     POS STACIA ---DR SHEARER-----CRYSTAL SURGERY SX CALL HM ISSUES---DIET E XER LSOE    WT LAB AND SEE ME AFTER------A GREAT DEAL OF TIME SPENT    REVIEWING MEDS, DIET, EXERCISE, SOCIAL ISSUES.  ALSO REVIEWING    CHART BEFORE ENTERING ROOM WITH PATIENT AND FINISHING CHARTING    AFTER LEAVING PATIENT'S ROOM-----MORE THAN HALF OF THE    FACE-TO-FACE TIME WITH PATIENT SPENT IN COUNSELING AND    COORDINATING CARE----- --------GREAT DEAL OF TIME EXPLAINING DISEASE    PROCESS AND RISK ASSOCIATED WITH SAME, LONG TERM IMPLICATIONS    AND POSSIBLE ADVERSE CONDITIONS. -----TAUGHT SYMPTOMS TO WATCH    AND NOTIFY-----      Past Medical History:   Diagnosis Date    Childhood asthma     Depression with anxiety     Diverticulitis     Fatigue     Fracture, ribs     10 11 12 ribs     History of stomach ulcers     IBS (irritable bowel syndrome)     Low vitamin B12 level     PTSD (post-traumatic stress disorder)     [facility]he[facility]oid [facility] ([facility])     right ankle    RA    Weight gain      Family History   Family history unknown: Yes      Past Surgical History:   Procedure Laterality Date    ACHILLES TENDON SURGERY Right 2/20/2019    RIGHT ACHILLES TENDON LENGTHENING performed by Rl Caal MD at [place] [address] Bilateral     CARPAL TUNNEL RELEASE Right     CARPAL TUNNEL RELEASE Right 06/2010    COLONOSCOPY  11/2012    severe TICS Sigmoid and Pan Tic     FOOT NEUROMA SURGERY      bilat    KNEE ARTHROSCOPY      left    OTHER SURGICAL HISTORY      excision neck mass    AR TOTAL ANKLE REPLACEMENT Left 3/21/2018    LEFT TOTAL ANKLE REPLACEMENT LEFT FOOT CHEILECTOMY   [facility]) performed by Rl Caal MD at [address] [place] [address] ARTHROSCOPY Right     TONSILLECTOMY      TOTAL ANKLE ARTHROPLASTY Right 2/20/2019    RIGHT ANKLE TOTAL ARTHROPLASTY performed by lR Caal MD at [address] [place] [address] ENDOSCOPY  4/4/2016    UPPER GASTROINTESTINAL ENDOSCOPY  11/2012    repeated 02-13 and improved       Vitals:    12/31/19 1047   BP: 128/82   Temp: 98.9 °F (37.2 °C)   Weight: 188 lb (85.3 kg)   Height: 5' 4\" (1.626 m)       Objective:    Physical Exam  Vitals signs reviewed. Constitutional:       Appearance: She is well-developed. HENT:      Head: Normocephalic. Eyes:      Pupils: Pupils are equal, round, and reactive to light. Neck:      Musculoskeletal: Normal range of motion. Cardiovascular:      Rate and Rhythm: Normal rate and regular rhythm. Pulmonary:      Effort: Pulmonary effort is normal.      Breath sounds: Normal breath sounds. Abdominal:      Palpations: Abdomen is soft. Musculoskeletal: Normal range of motion. Skin:     General: Skin is warm. Neurological:      Mental Status: She is alert and oriented to person, place, and time. Psychiatric:         Behavior: Behavior normal.         Yesi was seen today for other. Diagnoses and all orders for this visit:    Mixed stress and urge urinary incontinence  -     External Referral To Urology    Acute non-recurrent sinusitis of other sinus  -     cefdinir (OMNICEF) 300 MG capsule; Take 1 capsule by mouth 2 times daily for 10 days  -     montelukast (SINGULAIR) 10 MG tablet; Take 1 tablet by mouth nightly  -     predniSONE (DELTASONE) 10 MG tablet; ONE TID FOR THREE DAYS, ONE BID FOR THREE DAYS, ONE QD FOR THREE DAYS   FOOD    Rheumatoid arthritis involving multiple sites with positive rheumatoid factor (HCC)    Mild intermittent asthma without complication        Comments: Low-fat low sugar diet. Laboratory studies soon and see me afterwards. Offered rheumatology at [facility] clinic she wants to wait. Lose weight exercise. See me after blood work. Also 6 months with blood work a week before. If asthma should flare see me. Using inhaler as needed. Antibiotics prescribed. Appointment with GYN-urology. A great deal of time spent reviewing medications, diet, exercise, social issues.  Also reviewing the chart before

## 2020-01-01 PROBLEM — J45.20 MILD INTERMITTENT ASTHMA WITHOUT COMPLICATION: Status: ACTIVE | Noted: 2020-01-01

## 2020-01-01 PROBLEM — M06.9 RHEUMATOID ARTHRITIS (HCC): Chronic | Status: ACTIVE | Noted: 2019-02-20

## 2020-01-01 PROBLEM — N39.46 MIXED STRESS AND URGE URINARY INCONTINENCE: Chronic | Status: ACTIVE | Noted: 2020-01-01

## 2020-01-01 PROBLEM — M19.072 ARTHRITIS OF ANKLE, LEFT: Chronic | Status: ACTIVE | Noted: 2018-03-21

## 2020-01-01 PROBLEM — M19.90 OSTEOARTHRITIS: Chronic | Status: ACTIVE | Noted: 2019-02-21

## 2020-01-01 PROBLEM — N39.46 MIXED STRESS AND URGE URINARY INCONTINENCE: Status: ACTIVE | Noted: 2020-01-01

## 2020-01-01 ASSESSMENT — PATIENT HEALTH QUESTIONNAIRE - PHQ9
SUM OF ALL RESPONSES TO PHQ QUESTIONS 1-9: 0
SUM OF ALL RESPONSES TO PHQ QUESTIONS 1-9: 0
1. LITTLE INTEREST OR PLEASURE IN DOING THINGS: 0
SUM OF ALL RESPONSES TO PHQ9 QUESTIONS 1 & 2: 0
2. FEELING DOWN, DEPRESSED OR HOPELESS: 0

## 2020-01-01 ASSESSMENT — ENCOUNTER SYMPTOMS
ALLERGIC/IMMUNOLOGIC NEGATIVE: 1
EYES NEGATIVE: 1
RESPIRATORY NEGATIVE: 1
GASTROINTESTINAL NEGATIVE: 1

## 2020-01-13 ENCOUNTER — HOSPITAL ENCOUNTER (OUTPATIENT)
Age: 60
Discharge: HOME | End: 2020-01-13
Payer: MEDICARE

## 2020-01-13 LAB
ALBUMIN SERPL-MCNC: 4.5 G/DL (ref 3.5–5.2)
ALP BLD-CCNC: 107 U/L (ref 35–104)
ALT SERPL-CCNC: 19 U/L (ref 0–32)
ANION GAP SERPL CALCULATED.3IONS-SCNC: 12 MMOL/L (ref 7–16)
AST SERPL-CCNC: 18 U/L (ref 0–31)
BACTERIA: NORMAL /HPF
BASOPHILS ABSOLUTE: 0.03 E9/L (ref 0–0.2)
BASOPHILS RELATIVE PERCENT: 0.5 % (ref 0–2)
BILIRUB SERPL-MCNC: 0.3 MG/DL (ref 0–1.2)
BILIRUBIN URINE: NEGATIVE
BLOOD, URINE: NORMAL
BUN BLDV-MCNC: 11 MG/DL (ref 8–23)
CALCIUM SERPL-MCNC: 9.1 MG/DL (ref 8.6–10.2)
CHLORIDE BLD-SCNC: 100 MMOL/L (ref 98–107)
CHOLESTEROL, TOTAL: 236 MG/DL (ref 0–199)
CLARITY: CLEAR
CO2: 27 MMOL/L (ref 22–29)
COLOR: YELLOW
CREAT SERPL-MCNC: 0.7 MG/DL (ref 0.5–1)
EOSINOPHILS ABSOLUTE: 0.32 E9/L (ref 0.05–0.5)
EOSINOPHILS RELATIVE PERCENT: 5.2 % (ref 0–6)
GFR AFRICAN AMERICAN: >60
GFR NON-AFRICAN AMERICAN: >60 ML/MIN/1.73
GLUCOSE BLD-MCNC: 96 MG/DL (ref 74–99)
GLUCOSE URINE: NEGATIVE MG/DL
HBA1C MFR BLD: 5.4 % (ref 4–5.6)
HCT VFR BLD CALC: 41.7 % (ref 34–48)
HDLC SERPL-MCNC: 81 MG/DL
HEMOGLOBIN: 13 G/DL (ref 11.5–15.5)
IMMATURE GRANULOCYTES #: 0.01 E9/L
IMMATURE GRANULOCYTES %: 0.2 % (ref 0–5)
KETONES, URINE: NEGATIVE MG/DL
LDL CHOLESTEROL CALCULATED: 115 MG/DL (ref 0–99)
LEUKOCYTE ESTERASE, URINE: NEGATIVE
LYMPHOCYTES ABSOLUTE: 1.85 E9/L (ref 1.5–4)
LYMPHOCYTES RELATIVE PERCENT: 29.9 % (ref 20–42)
MCH RBC QN AUTO: 28.5 PG (ref 26–35)
MCHC RBC AUTO-ENTMCNC: 31.2 % (ref 32–34.5)
MCV RBC AUTO: 91.4 FL (ref 80–99.9)
MONOCYTES ABSOLUTE: 0.41 E9/L (ref 0.1–0.95)
MONOCYTES RELATIVE PERCENT: 6.6 % (ref 2–12)
NEUTROPHILS ABSOLUTE: 3.56 E9/L (ref 1.8–7.3)
NEUTROPHILS RELATIVE PERCENT: 57.6 % (ref 43–80)
NITRITE, URINE: NEGATIVE
PDW BLD-RTO: 14.7 FL (ref 11.5–15)
PH UA: 6.5 (ref 5–9)
PLATELET # BLD: 228 E9/L (ref 130–450)
PMV BLD AUTO: 11.6 FL (ref 7–12)
POTASSIUM SERPL-SCNC: 3.9 MMOL/L (ref 3.5–5)
PROTEIN UA: NEGATIVE MG/DL
RBC # BLD: 4.56 E12/L (ref 3.5–5.5)
RBC UA: NORMAL /HPF (ref 0–2)
RHEUMATOID FACTOR: 72 IU/ML (ref 0–13)
SEDIMENTATION RATE, ERYTHROCYTE: 20 MM/HR (ref 0–20)
SODIUM BLD-SCNC: 139 MMOL/L (ref 132–146)
SPECIFIC GRAVITY UA: <=1.005 (ref 1–1.03)
T4 TOTAL: 6.7 MCG/DL (ref 4.5–11.7)
TOTAL PROTEIN: 7.4 G/DL (ref 6.4–8.3)
TRIGL SERPL-MCNC: 200 MG/DL (ref 0–149)
TSH SERPL DL<=0.05 MIU/L-ACNC: 1.37 UIU/ML (ref 0.27–4.2)
UROBILINOGEN, URINE: 0.2 E.U./DL
VITAMIN D 25-HYDROXY: 35 NG/ML (ref 30–100)
VLDLC SERPL CALC-MCNC: 40 MG/DL
WBC # BLD: 6.2 E9/L (ref 4.5–11.5)
WBC UA: NORMAL /HPF (ref 0–5)

## 2020-01-13 PROCEDURE — 81001 URINALYSIS AUTO W/SCOPE: CPT

## 2020-01-13 PROCEDURE — 80053 COMPREHEN METABOLIC PANEL: CPT

## 2020-01-13 PROCEDURE — 85651 RBC SED RATE NONAUTOMATED: CPT

## 2020-01-13 PROCEDURE — 84443 ASSAY THYROID STIM HORMONE: CPT

## 2020-01-13 PROCEDURE — 86038 ANTINUCLEAR ANTIBODIES: CPT

## 2020-01-13 PROCEDURE — 84436 ASSAY OF TOTAL THYROXINE: CPT

## 2020-01-13 PROCEDURE — 82306 VITAMIN D 25 HYDROXY: CPT

## 2020-01-13 PROCEDURE — 80061 LIPID PANEL: CPT

## 2020-01-13 PROCEDURE — 83036 HEMOGLOBIN GLYCOSYLATED A1C: CPT

## 2020-01-13 PROCEDURE — 86431 RHEUMATOID FACTOR QUANT: CPT

## 2020-01-13 PROCEDURE — 36415 COLL VENOUS BLD VENIPUNCTURE: CPT

## 2020-01-13 PROCEDURE — 85025 COMPLETE CBC W/AUTO DIFF WBC: CPT

## 2020-01-14 ENCOUNTER — TELEPHONE (OUTPATIENT)
Dept: PRIMARY CARE CLINIC | Age: 60
End: 2020-01-14

## 2020-01-14 LAB — ANTI-NUCLEAR ANTIBODY (ANA): NEGATIVE

## 2020-01-20 ENCOUNTER — TELEPHONE (OUTPATIENT)
Dept: PRIMARY CARE CLINIC | Age: 60
End: 2020-01-20

## 2020-02-19 ENCOUNTER — OFFICE VISIT (OUTPATIENT)
Dept: PRIMARY CARE CLINIC | Age: 60
End: 2020-02-19
Payer: MEDICARE

## 2020-02-19 PROCEDURE — 99213 OFFICE O/P EST LOW 20 MIN: CPT | Performed by: FAMILY MEDICINE

## 2020-02-19 RX ORDER — CEFDINIR 300 MG/1
300 CAPSULE ORAL 2 TIMES DAILY
Qty: 20 CAPSULE | Refills: 0 | Status: SHIPPED | OUTPATIENT
Start: 2020-02-19 | End: 2020-02-29

## 2020-02-19 ASSESSMENT — ENCOUNTER SYMPTOMS
RESPIRATORY NEGATIVE: 1
EYES NEGATIVE: 1
GASTROINTESTINAL NEGATIVE: 1
ALLERGIC/IMMUNOLOGIC NEGATIVE: 1

## 2020-02-19 NOTE — PROGRESS NOTES
20  Name: Margoth Mancini    : 1960    Sex: female    Age: 62 y.o. Subjective:  Chief Complaint: Patient is here for sinus cough     three  Days  No  Temp  Chills   Surgery    shoudler  Left  3-17      Review of Systems   Constitutional: Negative. HENT: Negative. Congestion: Nasal Congestion. Eyes: Negative. Respiratory: Negative. Cardiovascular: Negative. Gastrointestinal: Negative. Endocrine: Negative. Genitourinary: Negative. Musculoskeletal: Negative. Skin: Negative. Allergic/Immunologic: Negative. Neurological: Negative. Hematological: Negative. Psychiatric/Behavioral: Negative. Current Outpatient Medications:     cefdinir (OMNICEF) 300 MG capsule, Take 1 capsule by mouth 2 times daily for 10 days, Disp: 20 capsule, Rfl: 0    montelukast (SINGULAIR) 10 MG tablet, Take 1 tablet by mouth nightly, Disp: 90 tablet, Rfl: 1    predniSONE (DELTASONE) 10 MG tablet, ONE TID FOR THREE DAYS, ONE BID FOR THREE DAYS, ONE QD FOR THREE DAYS   FOOD, Disp: 18 tablet, Rfl: 0    bisacodyl (DULCOLAX) 5 MG EC tablet, Take 1 tablet by mouth daily, Disp: 30 tablet, Rfl: 1    FLUoxetine HCl (PROZAC PO), Take 60 mg by mouth daily Instructed to take with sip water am of procedure, Disp: , Rfl:     zolpidem (AMBIEN) 5 MG tablet, Take 5 mg by mouth nightly as needed for Sleep, Disp: , Rfl:     ALPRAZolam (XANAX) 0.5 MG tablet, Take 0.5 mg by mouth as needed for Anxiety. Instructed to take with sip water am of procedure, if needed. ., Disp: , Rfl:     Multiple Vitamins-Minerals (CENTRUM PO), Take by mouth , Disp: , Rfl:   Allergies   Allergen Reactions    Percocet [Oxycodone-Acetaminophen] Nausea And Vomiting and Other (See Comments)     Felt like \"bugs crawling on skin\"    Percodan [Oxycodone-Aspirin] Nausea And Vomiting     Social History     Socioeconomic History    Marital status:      Spouse name: Not on file    Number of children: Not on file  Years of education: Not on file    Highest education level: Not on file   Occupational History    Not on file   Social Needs    Financial resource strain: Not on file    Food insecurity:     Worry: Not on file     Inability: Not on file    Transportation needs:     Medical: Not on file     Non-medical: Not on file   Tobacco Use    Smoking status: Former Smoker     Packs/day: 0.25     Years: 10.00     Pack years: 2.50     Last attempt to quit: 2010     Years since quittin.2    Smokeless tobacco: Never Used   Substance and Sexual Activity    Alcohol use: Yes     Comment: social    Drug use: No    Sexual activity: Not on file   Lifestyle    Physical activity:     Days per week: Not on file     Minutes per session: Not on file    Stress: Not on file   Relationships    Social connections:     Talks on phone: Not on file     Gets together: Not on file     Attends Latter-day service: Not on file     Active member of club or organization: Not on file     Attends meetings of clubs or organizations: Not on file     Relationship status: Not on file    Intimate partner violence:     Fear of current or ex partner: Not on file     Emotionally abused: Not on file     Physically abused: Not on file     Forced sexual activity: Not on file   Other Topics Concern    Not on file   Social History Narrative        DEPRESSION. RIGHT ANKLE SURGERIES TIMES THREE WITH CHRONIC SEVERE PAIN DUE TO INJURY FROM    . FATIGUE. Yesi EZE Dick  1960 Page #2    WEIGHT GAIN. FAMILY HISTORY OF DIABETES. IRRITABLE BOWEL SYNDROME. DEATH OF A SON 2 YEARS AGO. PROBABLY CARPAL TUNNEL SYNDROME. LEFT POSTERIOR NECK MASS.     POS RA ---DR SHEARER-----[facility]---TOLD NO RA BUT ONLY FIBROMYALGIA--    CHANGED TO DR VEGA---PT REFUSING CHRISTIAN    LOW -    SON IN [place]    FX L 10 11 12 RIBS    CTS OR 2010---RIGHT    COLON ---SEVERE TICS SIGMOID AND PAN TIC    EGD  reviewed. Constitutional:       Appearance: She is well-developed. HENT:      Head: Normocephalic. Eyes:      Pupils: Pupils are equal, round, and reactive to light. Neck:      Musculoskeletal: Normal range of motion and neck supple. Cardiovascular:      Rate and Rhythm: Normal rate and regular rhythm. Pulmonary:      Effort: Pulmonary effort is normal.      Breath sounds: Normal breath sounds. Abdominal:      Palpations: Abdomen is soft. Musculoskeletal: Normal range of motion. Skin:     General: Skin is warm. Neurological:      Mental Status: She is alert and oriented to person, place, and time. Psychiatric:         Behavior: Behavior normal.         Yesi was seen today for congestion and tinnitus. Diagnoses and all orders for this visit:    Acute bronchitis due to Streptococcus  -     cefdinir (OMNICEF) 300 MG capsule; Take 1 capsule by mouth 2 times daily for 10 days    Acute non-recurrent sinusitis of other sinus  -     cefdinir (OMNICEF) 300 MG capsule; Take 1 capsule by mouth 2 times daily for 10 days    Screening for breast cancer  -     SAMAN DIGITAL SCREEN W CAD BILATERAL; Future        Comments: Meds. Not better see her. She will be seeing me soon for a cardiac clearance for surgery. Not better see. Worse go to ER. A great deal of time spent reviewing medications, diet, exercise, social issues. Also reviewing the chart before entering the room with patient and finishing charting after leaving patient's room. More than half of that time was spent face to face with the patient in counseling and coordinating care. Follow Up: Return for reg.      Seen by:  Farhana Aguillon DO

## 2020-02-20 VITALS
WEIGHT: 186 LBS | TEMPERATURE: 98 F | DIASTOLIC BLOOD PRESSURE: 78 MMHG | BODY MASS INDEX: 31.93 KG/M2 | SYSTOLIC BLOOD PRESSURE: 128 MMHG

## 2020-02-20 ASSESSMENT — PATIENT HEALTH QUESTIONNAIRE - PHQ9
2. FEELING DOWN, DEPRESSED OR HOPELESS: 0
1. LITTLE INTEREST OR PLEASURE IN DOING THINGS: 0
SUM OF ALL RESPONSES TO PHQ QUESTIONS 1-9: 0
SUM OF ALL RESPONSES TO PHQ QUESTIONS 1-9: 0
SUM OF ALL RESPONSES TO PHQ9 QUESTIONS 1 & 2: 0

## 2020-03-04 ENCOUNTER — OFFICE VISIT (OUTPATIENT)
Dept: PRIMARY CARE CLINIC | Age: 60
End: 2020-03-04
Payer: MEDICARE

## 2020-03-04 ENCOUNTER — TELEPHONE (OUTPATIENT)
Dept: PRIMARY CARE CLINIC | Age: 60
End: 2020-03-04

## 2020-03-04 VITALS
WEIGHT: 183 LBS | DIASTOLIC BLOOD PRESSURE: 78 MMHG | BODY MASS INDEX: 31.24 KG/M2 | TEMPERATURE: 98.5 F | HEIGHT: 64 IN | SYSTOLIC BLOOD PRESSURE: 126 MMHG

## 2020-03-04 PROBLEM — J45.20 MILD INTERMITTENT ASTHMA WITHOUT COMPLICATION: Chronic | Status: ACTIVE | Noted: 2020-01-01

## 2020-03-04 PROBLEM — M19.012 PRIMARY OSTEOARTHRITIS OF LEFT SHOULDER: Status: ACTIVE | Noted: 2020-03-04

## 2020-03-04 PROBLEM — M19.012 PRIMARY OSTEOARTHRITIS OF LEFT SHOULDER: Chronic | Status: ACTIVE | Noted: 2020-03-04

## 2020-03-04 PROCEDURE — 99214 OFFICE O/P EST MOD 30 MIN: CPT | Performed by: FAMILY MEDICINE

## 2020-03-04 PROCEDURE — 93000 ELECTROCARDIOGRAM COMPLETE: CPT | Performed by: FAMILY MEDICINE

## 2020-03-04 RX ORDER — PREDNISONE 10 MG/1
10 TABLET ORAL
Qty: 15 TABLET | Refills: 0 | Status: SHIPPED | OUTPATIENT
Start: 2020-03-04 | End: 2020-03-09

## 2020-03-04 RX ORDER — DOXYCYCLINE HYCLATE 100 MG
100 TABLET ORAL 2 TIMES DAILY
Qty: 20 TABLET | Refills: 0 | Status: SHIPPED | OUTPATIENT
Start: 2020-03-04 | End: 2020-03-14

## 2020-03-04 ASSESSMENT — ENCOUNTER SYMPTOMS
RESPIRATORY NEGATIVE: 1
ALLERGIC/IMMUNOLOGIC NEGATIVE: 1
EYES NEGATIVE: 1
GASTROINTESTINAL NEGATIVE: 1

## 2020-03-04 NOTE — PROGRESS NOTES
ANKLE REPLACEMENT 3-21-18 DR Radha Dunaway----    RIGHT ANKLE REPLACEMENT 2-19 DR Radha Dunaway    Left shoulder OR  3-20  Dr Salma Quinteros      Past Medical History:   Diagnosis Date    Childhood asthma     Depression with anxiety     Diverticulitis     Fatigue     Fracture, ribs     10 11 12 ribs     History of stomach ulcers     IBS (irritable bowel syndrome)     Low vitamin B12 level     PTSD (post-traumatic stress disorder)     RA (rheumatoid arthritis) (HCC)     right ankle    RA    Weight gain      Family History   Family history unknown: Yes      Past Surgical History:   Procedure Laterality Date    ACHILLES TENDON SURGERY Right 2/20/2019    RIGHT ACHILLES TENDON LENGTHENING performed by Nadeem Montes De Oca MD at [place] [address] Bilateral    [address] Right     CARPAL TUNNEL RELEASE Right 06/2010    COLONOSCOPY  11/2012    severe TICS Sigmoid and Pan Tic     FOOT NEUROMA SURGERY      bilat    KNEE ARTHROSCOPY      left    OTHER SURGICAL HISTORY      excision neck mass    OK TOTAL ANKLE REPLACEMENT Left 3/21/2018    LEFT TOTAL ANKLE REPLACEMENT LEFT FOOT CHEILECTOMY   ([facility]) [facility] Nadeem [facility] De [facility] ARTHROSCOPY Right     TONSILLECTOMY      TOTAL ANKLE ARTHROPLASTY Right 2/20/2019    RIGHT ANKLE TOTAL ARTHROPLASTY performed by Nadeem Montes De Oca MD at [place] [address]  4/4/2016    UPPER GASTROINTESTINAL ENDOSCOPY  11/2012    repeated 02-13 and improved       Vitals:    03/04/20 1323   BP: 126/78   Temp: 98.5 °F (36.9 °C)   Weight: 183 lb (83 kg)   Height: 5' 4\" (1.626 m)       Objective:    Physical Exam  Vitals signs reviewed. Constitutional:       Appearance: She is well-developed. HENT:      Head: Normocephalic. Eyes:      Pupils: Pupils are equal, round, and reactive to light. Neck:      Musculoskeletal: Normal range of motion.    Cardiovascular:      Rate and Rhythm: Normal rate

## 2020-03-04 NOTE — TELEPHONE ENCOUNTER
Notify chest x-ray normal.  Fax to Dr. Sasha Wren along with my OV note and lab  when it is done and EKG

## 2020-03-04 NOTE — TELEPHONE ENCOUNTER
Pt notified. Lab upstairs advised pt to call Dr. Vu Jones office regarding labs he wanted done. Said they're unable to do them upstairs. Pt returned to office for Dr. Vu Jones original lab order.   Will fax ov note and cxr report when Dr. Tuan Cespedes completes note

## 2020-05-14 ENCOUNTER — NURSE TRIAGE (OUTPATIENT)
Dept: OTHER | Facility: CLINIC | Age: 60
End: 2020-05-14

## 2020-05-14 ENCOUNTER — VIRTUAL VISIT (OUTPATIENT)
Dept: PRIMARY CARE CLINIC | Age: 60
End: 2020-05-14
Payer: MEDICARE

## 2020-05-14 ENCOUNTER — TELEPHONE (OUTPATIENT)
Dept: PRIMARY CARE CLINIC | Age: 60
End: 2020-05-14

## 2020-05-14 VITALS — BODY MASS INDEX: 30.9 KG/M2 | WEIGHT: 180 LBS

## 2020-05-14 PROCEDURE — 99213 OFFICE O/P EST LOW 20 MIN: CPT | Performed by: FAMILY MEDICINE

## 2020-05-14 RX ORDER — ALBUTEROL SULFATE 90 UG/1
2 AEROSOL, METERED RESPIRATORY (INHALATION) 4 TIMES DAILY PRN
Qty: 1 INHALER | Refills: 5 | Status: SHIPPED
Start: 2020-05-14 | End: 2021-11-16

## 2020-05-14 RX ORDER — CEFDINIR 300 MG/1
300 CAPSULE ORAL 2 TIMES DAILY
Qty: 20 CAPSULE | Refills: 0 | Status: SHIPPED | OUTPATIENT
Start: 2020-05-14 | End: 2020-05-24

## 2020-05-14 ASSESSMENT — PATIENT HEALTH QUESTIONNAIRE - PHQ9
SUM OF ALL RESPONSES TO PHQ QUESTIONS 1-9: 0
SUM OF ALL RESPONSES TO PHQ QUESTIONS 1-9: 0
SUM OF ALL RESPONSES TO PHQ9 QUESTIONS 1 & 2: 0
2. FEELING DOWN, DEPRESSED OR HOPELESS: 0
1. LITTLE INTEREST OR PLEASURE IN DOING THINGS: 0

## 2020-05-14 NOTE — PROGRESS NOTES
TeleMedicine Patient Consent    This visit was performed as a virtual video visit using a synchronous, two-way, audio-video telehealth technology platform. Patient identification was verified at the start of the visit, including the patient's telephone number and physical location. I discussed with the patient the nature of our telehealth visits, that:     1. Due to the nature of an audio- video modality, the only components of a physical exam that could be done are the elements supported by direct observation. 2. I would evaluate the patient and recommend diagnostics and treatments based on my assessment. 3. If it was felt that the patient should be evaluated in clinic or an emergency room setting, then they would be directed there. 4. Our sessions are not being recorded and that personal health information is protected. 5. Our team would provide follow up care in person if/when the patient needs it. Patient does agree to proceed with telemedicine consultation. Patient's location: home address in [facility]  Physician  location home address in [facility] other people involved in call my fernie urrutia        Time spent: Greater than Not billed by time    This visit was completed virtually using Doxy. me               2020    TELEHEALTH EVALUATION -- Audio/Visual (During FKFSN-04 public health emergency)    HPI:    Ivan Mckeon 7 (:  1960) has requested an audio/video evaluation for the following concern(s):    Patient requested a virtual visit because of wheezing allergies sinus. No temperature sweats chills or chest pain. Going on for about 5 days. She has had this a several times in the past.  Her inhaler is old    Review of Systems no chest  pain shortness of breath or chills or temperature    Prior to Visit Medications    Medication Sig Taking?  Authorizing Provider   cefdinir (OMNICEF) 300 MG capsule Take 1 capsule by mouth 2 times daily for 10 days Yes Alfredito Moore, DO   albuterol sulfate HFA Normocephalic, atraumatic. [] Abnormal   [] Mouth/Throat: Mucous membranes are moist.     External Ears [x] Normal  [] Abnormal-     Neck: [x] No visualized mass     Pulmonary/Chest: [x] Respiratory effort normal.  [] No visualized signs of difficulty breathing or respiratory distress        [] Abnormal-      Musculoskeletal:   [x] Normal gait with no signs of ataxia         [] Normal range of motion of neck        [] Abnormal-       Neurological:        [x] No Facial Asymmetry (Cranial nerve 7 motor function) (limited exam to video visit)          [] No gaze palsy        [] Abnormal-         Skin:        [x] No significant exanthematous lesions or discoloration noted on facial skin         [] Abnormal-            Psychiatric:       [x] Normal Affect [] No Hallucinations        [] Abnormal-     Other pertinent observable physical exam findings-     ASSESSMENT/PLAN:  1. Acute non-recurrent sinusitis of other sinus  We will start antibiotic and inhaler. Educated on how to use inhaler. Mucinex DM twice a day. If any chills or temperature or shortness of breath go to the flu clinic or emergency room. - cefdinir (OMNICEF) 300 MG capsule; Take 1 capsule by mouth 2 times daily for 10 days  Dispense: 20 capsule; Refill: 0  - albuterol sulfate HFA (VENTOLIN HFA) 108 (90 Base) MCG/ACT inhaler; Inhale 2 puffs into the lungs 4 times daily as needed for Wheezing  Dispense: 1 Inhaler; Refill: 5    2. Mild intermittent asthma without complication  Take probiotics with her antibiotic. A great deal of time spent reviewing medications, diet, exercise, social issues. Also reviewing the chart before entering the room with patient and finishing charting after leaving patient's room. More than half of that time was spent face to face with the patient in counseling and coordinating care. - cefdinir (OMNICEF) 300 MG capsule; Take 1 capsule by mouth 2 times daily for 10 days  Dispense: 20 capsule;  Refill: 0  - albuterol sulfate

## 2021-01-07 ENCOUNTER — OFFICE VISIT (OUTPATIENT)
Dept: PRIMARY CARE CLINIC | Age: 61
End: 2021-01-07
Payer: MEDICARE

## 2021-01-07 VITALS
TEMPERATURE: 98.3 F | WEIGHT: 190 LBS | BODY MASS INDEX: 32.61 KG/M2 | DIASTOLIC BLOOD PRESSURE: 82 MMHG | SYSTOLIC BLOOD PRESSURE: 132 MMHG

## 2021-01-07 DIAGNOSIS — J01.80 ACUTE NON-RECURRENT SINUSITIS OF OTHER SINUS: Primary | ICD-10-CM

## 2021-01-07 DIAGNOSIS — D22.9 ATYPICAL MOLE: ICD-10-CM

## 2021-01-07 DIAGNOSIS — H61.23 BILATERAL IMPACTED CERUMEN: ICD-10-CM

## 2021-01-07 PROCEDURE — 99213 OFFICE O/P EST LOW 20 MIN: CPT | Performed by: FAMILY MEDICINE

## 2021-01-07 RX ORDER — CEPHALEXIN 500 MG/1
500 CAPSULE ORAL 2 TIMES DAILY
Qty: 14 CAPSULE | Refills: 0 | Status: SHIPPED | OUTPATIENT
Start: 2021-01-07 | End: 2021-01-14

## 2021-01-07 ASSESSMENT — ENCOUNTER SYMPTOMS
EYES NEGATIVE: 1
RESPIRATORY NEGATIVE: 1
RHINORRHEA: 1
SINUS PAIN: 1
ALLERGIC/IMMUNOLOGIC NEGATIVE: 1
GASTROINTESTINAL NEGATIVE: 1

## 2021-01-07 ASSESSMENT — PATIENT HEALTH QUESTIONNAIRE - PHQ9
SUM OF ALL RESPONSES TO PHQ9 QUESTIONS 1 & 2: 0
SUM OF ALL RESPONSES TO PHQ QUESTIONS 1-9: 0
SUM OF ALL RESPONSES TO PHQ QUESTIONS 1-9: 0

## 2021-01-07 NOTE — PROGRESS NOTES
21  Name: Quinton Black    : 1960    Sex: female    Age: 56 y.o. Subjective:  Chief Complaint: Patient is here for nasal  Juana Specter feels plugged     No tem p chills   No cp or sob     Put off the left knee suregery for fewmonths  Lesion nose      Review of Systems   Constitutional: Negative. HENT: Positive for rhinorrhea and sinus pain. Eyes: Negative. Respiratory: Negative. Cardiovascular: Negative. Gastrointestinal: Negative. Endocrine: Negative. Genitourinary: Negative. Musculoskeletal: Negative. Skin: Negative. Allergic/Immunologic: Negative. Neurological: Negative. Hematological: Negative. Psychiatric/Behavioral: Negative.           Current Outpatient Medications:     cephALEXin (KEFLEX) 500 MG capsule, Take 1 capsule by mouth 2 times daily for 7 days, Disp: 14 capsule, Rfl: 0    albuterol sulfate HFA (VENTOLIN HFA) 108 (90 Base) MCG/ACT inhaler, Inhale 2 puffs into the lungs 4 times daily as needed for Wheezing, Disp: 1 Inhaler, Rfl: 5    montelukast (SINGULAIR) 10 MG tablet, Take 1 tablet by mouth nightly, Disp: 90 tablet, Rfl: 1    bisacodyl (DULCOLAX) 5 MG EC tablet, Take 1 tablet by mouth daily, Disp: 30 tablet, Rfl: 1  Allergies   Allergen Reactions    Iodides     Percocet [Oxycodone-Acetaminophen] Nausea And Vomiting and Other (See Comments)     Felt like \"bugs crawling on skin\"    Percodan [Oxycodone-Aspirin] Nausea And Vomiting     Social History     Socioeconomic History    Marital status:      Spouse name: Not on file    Number of children: Not on file    Years of education: Not on file    Highest education level: Not on file   Occupational History    Not on file   Social Needs    Financial resource strain: Not on file    Food insecurity     Worry: Not on file     Inability: Not on file    Transportation needs     Medical: Not on file     Non-medical: Not on file   Tobacco Use    Smoking status: Former Smoker Left shoulder OR  3-20  Dr Brooks Portal    Left total knee   dr Jw Acevedo      Past Medical History:   Diagnosis Date    Childhood asthma     Depression with anxiety     Diverticulitis     Fatigue     Fracture, ribs     10 11 12 ribs     History of stomach ulcers     IBS (irritable bowel syndrome)     Low vitamin B12 level     PTSD (post-traumatic stress disorder)     RA (rheumatoid arthritis) ([facility] [address]     right ankle    RA    Weight gain      Family History   Family history unknown: Yes      Past Surgical History:   Procedure Laterality Date    ACHILLES TENDON SURGERY Right 2/20/2019    RIGHT ACHILLES TENDON LENGTHENING performed by [facility]vinder Banegas [facility] at [facility] Bilateral    [address] Right     CARPAL TUNNEL RELEASE Right 06/2010    COLONOSCOPY  11/2012    severe TICS Sigmoid and Pan Tic     FOOT NEUROMA SURGERY      bilat    KNEE ARTHROSCOPY      left    OTHER SURGICAL HISTORY      excision neck mass    [place] TOTAL ANKLE REPLACEMENT Left 3/21/2018    LEFT TOTAL ANKLE REPLACEMENT LEFT FOOT CHEILECTOMY   ([facility]) performed [facility] at [facility] ARTHROSCOPY Right     TONSILLECTOMY      TOTAL ANKLE ARTHROPLASTY Right 2/20/2019    RIGHT ANKLE TOTAL ARTHROPLASTY performed by Gurvinder Banegas MD at [place] [address]  4/4/2016    UPPER GASTROINTESTINAL ENDOSCOPY  11/2012    repeated 02-13 and improved       Vitals:    01/07/21 1246   BP: 132/82   Temp: 98.3 °F (36.8 °C)   TempSrc: Oral   Weight: 190 lb (86.2 kg)       Objective:    Physical Exam  Vitals signs reviewed. Constitutional:       Appearance: She is well-developed. HENT:      Head: Normocephalic. Ears:      Comments: bilat   Ear  Wax out  Eyes:      Pupils: Pupils are equal, round, and reactive to light. Neck:      Musculoskeletal: Normal range of motion.    Cardiovascular: Rate and Rhythm: Normal rate and regular rhythm. Pulmonary:      Effort: Pulmonary effort is normal.      Breath sounds: Normal breath sounds. Abdominal:      Palpations: Abdomen is soft. Musculoskeletal: Normal range of motion. Skin:     General: Skin is warm. Neurological:      Mental Status: She is alert and oriented to person, place, and time. Psychiatric:         Behavior: Behavior normal.         Yesi was seen today for other. Diagnoses and all orders for this visit:    Acute non-recurrent sinusitis of other sinus  -     cephALEXin (KEFLEX) 500 MG capsule; Take 1 capsule by mouth 2 times daily for 7 days    Bilateral impacted cerumen    Atypical paradise  -     Lisandro Romeo MD, Dermatology, [place] ([facility])        Comments: bialt  Ear wax ou t     appt jeffery     Ab  Not  Great see A great deal of time spent reviewing medications, diet, exercise, social issues. Also reviewing the chart before entering the room with patient and finishing charting after leaving patient's room. More than half of that time was spent face to face with the patient in counseling and coordinating care. See me for pre op   beore knee    Follow Up: No follow-ups on file.      Seen by:  Sara Leonardo DO

## 2021-01-28 ENCOUNTER — TELEPHONE (OUTPATIENT)
Dept: PRIMARY CARE CLINIC | Age: 61
End: 2021-01-28

## 2021-01-28 DIAGNOSIS — D22.9 ATYPICAL MOLE: Primary | ICD-10-CM

## 2021-01-28 NOTE — TELEPHONE ENCOUNTER
You referred the pt to [facility] for an Atypical mole on her nose.  She is calling because they are not taking new pt's and she would like your recommendation for another dermatologist you can refer her to

## 2021-01-29 ENCOUNTER — TELEPHONE (OUTPATIENT)
Dept: PRIMARY CARE CLINIC | Age: 61
End: 2021-01-29

## 2021-01-29 DIAGNOSIS — D22.9 ATYPICAL MOLE: Primary | ICD-10-CM

## 2021-01-29 NOTE — TELEPHONE ENCOUNTER
Pt would much rather go to plastic surgeon since mole is on her nose.   Please put order in for plastic surgeon

## 2021-03-16 ENCOUNTER — TELEPHONE (OUTPATIENT)
Dept: PRIMARY CARE CLINIC | Age: 61
End: 2021-03-16

## 2021-04-12 ENCOUNTER — TELEPHONE (OUTPATIENT)
Dept: PRIMARY CARE CLINIC | Age: 61
End: 2021-04-12

## 2021-04-12 NOTE — TELEPHONE ENCOUNTER
Pt had covid vaccine (J&J) on 4/8. Wanted to know if she has to wait to have her knee replacement or if she can schedule it.   Advised call surgeon

## 2021-04-15 ENCOUNTER — OFFICE VISIT (OUTPATIENT)
Dept: PRIMARY CARE CLINIC | Age: 61
End: 2021-04-15
Payer: MEDICARE

## 2021-04-15 VITALS
BODY MASS INDEX: 32.79 KG/M2 | SYSTOLIC BLOOD PRESSURE: 135 MMHG | DIASTOLIC BLOOD PRESSURE: 83 MMHG | TEMPERATURE: 98.3 F | WEIGHT: 191 LBS

## 2021-04-15 DIAGNOSIS — G89.29 CHRONIC PAIN OF RIGHT KNEE: Primary | ICD-10-CM

## 2021-04-15 DIAGNOSIS — M05.79 RHEUMATOID ARTHRITIS INVOLVING MULTIPLE SITES WITH POSITIVE RHEUMATOID FACTOR (HCC): Chronic | ICD-10-CM

## 2021-04-15 DIAGNOSIS — M81.0 AGE-RELATED OSTEOPOROSIS WITHOUT CURRENT PATHOLOGICAL FRACTURE: ICD-10-CM

## 2021-04-15 DIAGNOSIS — R73.03 PRE-DIABETES: ICD-10-CM

## 2021-04-15 DIAGNOSIS — R60.0 EDEMA, LOWER EXTREMITY: ICD-10-CM

## 2021-04-15 DIAGNOSIS — M79.604 RIGHT LEG PAIN: ICD-10-CM

## 2021-04-15 DIAGNOSIS — M25.561 CHRONIC PAIN OF RIGHT KNEE: Primary | ICD-10-CM

## 2021-04-15 DIAGNOSIS — I10 ESSENTIAL HYPERTENSION: ICD-10-CM

## 2021-04-15 DIAGNOSIS — E03.9 ACQUIRED HYPOTHYROIDISM: ICD-10-CM

## 2021-04-15 PROCEDURE — 99213 OFFICE O/P EST LOW 20 MIN: CPT | Performed by: FAMILY MEDICINE

## 2021-04-15 ASSESSMENT — ENCOUNTER SYMPTOMS
RESPIRATORY NEGATIVE: 1
EYES NEGATIVE: 1
ALLERGIC/IMMUNOLOGIC NEGATIVE: 1
GASTROINTESTINAL NEGATIVE: 1

## 2021-04-15 ASSESSMENT — PATIENT HEALTH QUESTIONNAIRE - PHQ9
SUM OF ALL RESPONSES TO PHQ9 QUESTIONS 1 & 2: 0
SUM OF ALL RESPONSES TO PHQ QUESTIONS 1-9: 0

## 2021-04-15 NOTE — PROGRESS NOTES
4/15/21  Name: Vincent Nuno    : 1960    Sex: female    Age: 56 y.o. Subjective:  Chief Complaint: Patient is here for right knee pain     Right  knee pain  Few days     Worse then usual  eshe is plannign  Left knee surgeyr   Soon with dr Foley Prior  She  Had   [employer] shot  4-8      Review of Systems   Constitutional: Negative. HENT: Negative. Eyes: Negative. Respiratory: Negative. Cardiovascular: Negative. Gastrointestinal: Negative. Endocrine: Negative. Genitourinary: Negative. Musculoskeletal:        Left knee pain   Skin: Negative. Allergic/Immunologic: Negative. Neurological: Negative. Hematological: Negative. Psychiatric/Behavioral: Negative.           Current Outpatient Medications:     albuterol sulfate HFA (VENTOLIN HFA) 108 (90 Base) MCG/ACT inhaler, Inhale 2 puffs into the lungs 4 times daily as needed for Wheezing, Disp: 1 Inhaler, Rfl: 5    montelukast (SINGULAIR) 10 MG tablet, Take 1 tablet by mouth nightly, Disp: 90 tablet, Rfl: 1    bisacodyl (DULCOLAX) 5 MG EC tablet, Take 1 tablet by mouth daily, Disp: 30 tablet, Rfl: 1  Allergies   Allergen Reactions    Iodides     Percocet [Oxycodone-Acetaminophen] Nausea And Vomiting and Other (See Comments)     Felt like \"bugs crawling on skin\"    Percodan [Oxycodone-Aspirin] Nausea And Vomiting     Social History     Socioeconomic History    Marital status:      Spouse name: Not on file    Number of children: Not on file    Years of education: Not on file    Highest education level: Not on file   Occupational History    Not on file   Social Needs    Financial resource strain: Not on file    Food insecurity     Worry: Not on file     Inability: Not on file    Transportation needs     Medical: Not on file     Non-medical: Not on file   Tobacco Use    Smoking status: Former Smoker     Packs/day: 0.25     Years: 10.00     Pack years: 2.50     Quit date: 2010     Years since quitting: 10.4    Smokeless tobacco: Never Used   Substance and Sexual Activity    Alcohol use: Yes     Comment: social    Drug use: No    Sexual activity: Not on file   Lifestyle    Physical activity     Days per week: Not on file     Minutes per session: Not on file    Stress: Not on file   Relationships    Social connections     Talks on phone: Not on file     Gets together: Not on file     Attends Jehovah's witness service: Not on file     Active member of club or organization: Not on file     Attends meetings of clubs or organizations: Not on file     Relationship status: Not on file    Intimate partner violence     Fear of current or ex partner: Not on file     Emotionally abused: Not on file     Physically abused: Not on file     Forced sexual activity: Not on file   Other Topics Concern    Not on file   Social History Narrative        DEPRESSION. RIGHT ANKLE SURGERIES TIMES THREE WITH CHRONIC SEVERE PAIN DUE TO INJURY FROM    . FATIGUE. Yesi EZE Romoon  1960 Page #2    WEIGHT GAIN. FAMILY HISTORY OF DIABETES. IRRITABLE BOWEL SYNDROME. DEATH OF A SON 2 YEARS AGO. PROBABLY CARPAL TUNNEL SYNDROME. LEFT POSTERIOR NECK MASS.     POS RA ---DR SHEARER-----[facility]---TOLD NO RA BUT ONLY FIBROMYALGIA--    CHANGED TO DR VEGA---PT REFUSING EMBREL    LOW B-12     SON IN [place]    FX L 10 11 12 RIBS    CTS OR 2010---RIGHT    COLON ---SEVERE TICS SIGMOID AND PAN TIC    EGD  GASTRITIS REPEATED IN  AND IMPROVED---    SMOKER QUIT AGE 46    EGD DR CHAUDHARI 3-16 AND SETTING UP PH STUDY    20 YR OL D SON SUICIDE  IN [place]-----PT SON HAS 5 KIDS    MOM  2017---AT AGE 80    TWO SONS--ONE -----OTHER MOVED FROM [place] TO [place] AND MOVED INTO [facility]---    LEFT ANKLE REPLACEMENT 3-21- DR Marnie Phalen----    RIGHT ANKLE REPLACEMENT  DR Marnie Phalen    Left shoulder OR  3-20  Dr Patrick Segovia    Left total knee   dr Cora Jesus with [facility] cousin      Past Medical History:   Diagnosis Date    Childhood asthma     Depression with anxiety     Diverticulitis     Fatigue     Fracture, ribs     10 11 12 ribs     History of stomach ulcers     IBS (irritable bowel syndrome)     Low vitamin B12 level     PTSD (post-traumatic stress disorder)     [facility] ([facility])     right ankle    RA    Weight gain      Family History   Family history unknown: Yes      Past Surgical History:   Procedure Laterality Date    ACHILLES TENDON SURGERY Right 2/20/2019    RIGHT ACHILLES TENDON LENGTHENING performed by Elise Reyes MD at [facility] Bilateral    [address] Right     CARPAL TUNNEL RELEASE Right 06/2010    COLONOSCOPY  11/2012    severe TICS Sigmoid and Pan Tic     FOOT NEUROMA SURGERY      bilat    KNEE ARTHROSCOPY      left    OTHER SURGICAL HISTORY      excision neck mass    [place] TOTAL ANKLE REPLACEMENT Left 3/21/2018    LEFT TOTAL ANKLE REPLACEMENT LEFT FOOT CHEILECTOMY   ([facility]) [facility] at [facility] ARTHROSCOPY Right     TONSILLECTOMY      TOTAL ANKLE ARTHROPLASTY Right 2/20/2019    RIGHT ANKLE TOTAL ARTHROPLASTY performed by Elise Reyes MD at [place] [address]  4/4/2016    UPPER GASTROINTESTINAL ENDOSCOPY  11/2012    repeated 02-13 and improved       Vitals:    04/15/21 1612   BP: 135/83   Temp: 98.3 °F (36.8 °C)   TempSrc: Oral   Weight: 191 lb (86.6 kg)       Objective:    Physical Exam  Vitals signs reviewed. Constitutional:       Appearance: She is well-developed. HENT:      Head: Normocephalic. Eyes:      Pupils: Pupils are equal, round, and reactive to light. Neck:      Musculoskeletal: Normal range of motion. Cardiovascular:      Rate and Rhythm: Normal rate and regular rhythm. Pulmonary:      Effort: Pulmonary effort is normal.      Breath sounds: Normal breath sounds.    Abdominal: Palpations: Abdomen is soft. Musculoskeletal:      Comments: Mild bulge behind right knee   crep both knees  Neg homans   Skin:     General: Skin is warm. Neurological:      Mental Status: She is alert and oriented to person, place, and time. Psychiatric:         Behavior: Behavior normal.         Yesi was seen today for knee pain. Diagnoses and all orders for this visit:    Chronic pain of right knee    Right leg pain    Rheumatoid arthritis involving multiple sites with positive rheumatoid factor (HCC)  -     SUSANA; Future  -     Rheumatoid Factor; Future  -     Sedimentation Rate; Future    Age-related osteoporosis without current pathological fracture  -     Vitamin D 25 Hydroxy; Future    Edema, lower extremity  -     D-DIMER, QUANTITATIVE; Future    Acquired hypothyroidism  -     TSH without Reflex; Future  -     T4; Future    Pre-diabetes  -     Hemoglobin A1C; Future    Essential hypertension  -     CBC Auto Differential; Future  -     Comprehensive Metabolic Panel; Future  -     Lipid Panel; Future        Comments: will get d dimer to make sure o clot  Lab   Diet exer hm issues  lsoe wt      See me  When date set for surgyer   Will need pre op learance  A great deal of time spent reviewing medications, diet, exercise, social issues. Also reviewing the chart before entering the room with patient and finishing charting after leaving patient's room. More than half of that time was spent face to face with the patient in counseling and coordinating care. Follow Up: Return for Reg Appt.      Seen by:  Thais Montalvo DO

## 2021-04-19 ENCOUNTER — TELEPHONE (OUTPATIENT)
Dept: PRIMARY CARE CLINIC | Age: 61
End: 2021-04-19

## 2021-04-19 ENCOUNTER — HOSPITAL ENCOUNTER (EMERGENCY)
Age: 61
Discharge: HOME | End: 2021-04-19
Attending: EMERGENCY MEDICINE
Payer: MEDICARE

## 2021-04-19 ENCOUNTER — APPOINTMENT (OUTPATIENT)
Dept: CT IMAGING | Age: 61
End: 2021-04-19
Payer: MEDICARE

## 2021-04-19 ENCOUNTER — APPOINTMENT (OUTPATIENT)
Dept: ULTRASOUND IMAGING | Age: 61
End: 2021-04-19
Payer: MEDICARE

## 2021-04-19 VITALS
TEMPERATURE: 97.8 F | BODY MASS INDEX: 32.44 KG/M2 | DIASTOLIC BLOOD PRESSURE: 75 MMHG | OXYGEN SATURATION: 98 % | HEART RATE: 73 BPM | WEIGHT: 190 LBS | SYSTOLIC BLOOD PRESSURE: 167 MMHG | RESPIRATION RATE: 14 BRPM | HEIGHT: 64 IN

## 2021-04-19 DIAGNOSIS — E03.9 ACQUIRED HYPOTHYROIDISM: ICD-10-CM

## 2021-04-19 DIAGNOSIS — Z01.818 PRE-OP EXAM: ICD-10-CM

## 2021-04-19 DIAGNOSIS — M81.0 AGE-RELATED OSTEOPOROSIS WITHOUT CURRENT PATHOLOGICAL FRACTURE: ICD-10-CM

## 2021-04-19 DIAGNOSIS — R73.03 PRE-DIABETES: ICD-10-CM

## 2021-04-19 DIAGNOSIS — M79.604 RIGHT LEG PAIN: Primary | ICD-10-CM

## 2021-04-19 DIAGNOSIS — I10 ESSENTIAL HYPERTENSION: ICD-10-CM

## 2021-04-19 DIAGNOSIS — M19.012 PRIMARY OSTEOARTHRITIS OF LEFT SHOULDER: ICD-10-CM

## 2021-04-19 DIAGNOSIS — M05.79 RHEUMATOID ARTHRITIS INVOLVING MULTIPLE SITES WITH POSITIVE RHEUMATOID FACTOR (HCC): Chronic | ICD-10-CM

## 2021-04-19 DIAGNOSIS — R60.0 EDEMA, LOWER EXTREMITY: ICD-10-CM

## 2021-04-19 DIAGNOSIS — R06.00 DYSPNEA AND RESPIRATORY ABNORMALITIES: ICD-10-CM

## 2021-04-19 DIAGNOSIS — R06.89 DYSPNEA AND RESPIRATORY ABNORMALITIES: ICD-10-CM

## 2021-04-19 LAB
ALBUMIN SERPL-MCNC: 4.4 G/DL (ref 3.5–5.2)
ALP BLD-CCNC: 110 U/L (ref 35–104)
ALT SERPL-CCNC: 26 U/L (ref 0–32)
ANION GAP SERPL CALCULATED.3IONS-SCNC: 14 MMOL/L (ref 7–16)
ANION GAP SERPL CALCULATED.3IONS-SCNC: 9 MMOL/L (ref 7–16)
AST SERPL-CCNC: 22 U/L (ref 0–31)
BASOPHILS ABSOLUTE: 0.03 E9/L (ref 0–0.2)
BASOPHILS ABSOLUTE: 0.04 E9/L (ref 0–0.2)
BASOPHILS RELATIVE PERCENT: 0.4 % (ref 0–2)
BASOPHILS RELATIVE PERCENT: 0.7 % (ref 0–2)
BILIRUB SERPL-MCNC: 0.3 MG/DL (ref 0–1.2)
BUN BLDV-MCNC: 12 MG/DL (ref 8–23)
BUN BLDV-MCNC: 12 MG/DL (ref 8–23)
CALCIUM SERPL-MCNC: 9.4 MG/DL (ref 8.6–10.2)
CALCIUM SERPL-MCNC: 9.9 MG/DL (ref 8.6–10.2)
CHLORIDE BLD-SCNC: 100 MMOL/L (ref 98–107)
CHLORIDE BLD-SCNC: 102 MMOL/L (ref 98–107)
CHOLESTEROL, TOTAL: 232 MG/DL (ref 0–199)
CO2: 23 MMOL/L (ref 22–29)
CO2: 26 MMOL/L (ref 22–29)
CREAT SERPL-MCNC: 0.7 MG/DL (ref 0.5–1)
CREAT SERPL-MCNC: 0.7 MG/DL (ref 0.5–1)
D DIMER: 813 NG/ML DDU
D DIMER: 823 NG/ML DDU
EOSINOPHILS ABSOLUTE: 0.26 E9/L (ref 0.05–0.5)
EOSINOPHILS ABSOLUTE: 0.3 E9/L (ref 0.05–0.5)
EOSINOPHILS RELATIVE PERCENT: 3.7 % (ref 0–6)
EOSINOPHILS RELATIVE PERCENT: 5 % (ref 0–6)
GFR AFRICAN AMERICAN: >60
GFR AFRICAN AMERICAN: >60
GFR NON-AFRICAN AMERICAN: >60 ML/MIN/1.73
GFR NON-AFRICAN AMERICAN: >60 ML/MIN/1.73
GLUCOSE BLD-MCNC: 92 MG/DL (ref 74–99)
GLUCOSE BLD-MCNC: 94 MG/DL (ref 74–99)
HBA1C MFR BLD: 5.3 % (ref 4–5.6)
HCT VFR BLD CALC: 40.7 % (ref 34–48)
HCT VFR BLD CALC: 41.5 % (ref 34–48)
HDLC SERPL-MCNC: 91 MG/DL
HEMOGLOBIN: 12.2 G/DL (ref 11.5–15.5)
HEMOGLOBIN: 12.9 G/DL (ref 11.5–15.5)
IMMATURE GRANULOCYTES #: 0.01 E9/L
IMMATURE GRANULOCYTES #: 0.01 E9/L
IMMATURE GRANULOCYTES %: 0.1 % (ref 0–5)
IMMATURE GRANULOCYTES %: 0.2 % (ref 0–5)
LDL CHOLESTEROL CALCULATED: 113 MG/DL (ref 0–99)
LYMPHOCYTES ABSOLUTE: 2.21 E9/L (ref 1.5–4)
LYMPHOCYTES ABSOLUTE: 2.69 E9/L (ref 1.5–4)
LYMPHOCYTES RELATIVE PERCENT: 36.8 % (ref 20–42)
LYMPHOCYTES RELATIVE PERCENT: 38.6 % (ref 20–42)
MCH RBC QN AUTO: 27.9 PG (ref 26–35)
MCH RBC QN AUTO: 28.1 PG (ref 26–35)
MCHC RBC AUTO-ENTMCNC: 30 % (ref 32–34.5)
MCHC RBC AUTO-ENTMCNC: 31.1 % (ref 32–34.5)
MCV RBC AUTO: 90.4 FL (ref 80–99.9)
MCV RBC AUTO: 92.9 FL (ref 80–99.9)
MONOCYTES ABSOLUTE: 0.49 E9/L (ref 0.1–0.95)
MONOCYTES ABSOLUTE: 0.53 E9/L (ref 0.1–0.95)
MONOCYTES RELATIVE PERCENT: 7.6 % (ref 2–12)
MONOCYTES RELATIVE PERCENT: 8.2 % (ref 2–12)
NEUTROPHILS ABSOLUTE: 2.96 E9/L (ref 1.8–7.3)
NEUTROPHILS ABSOLUTE: 3.44 E9/L (ref 1.8–7.3)
NEUTROPHILS RELATIVE PERCENT: 49.1 % (ref 43–80)
NEUTROPHILS RELATIVE PERCENT: 49.6 % (ref 43–80)
PDW BLD-RTO: 14.3 FL (ref 11.5–15)
PDW BLD-RTO: 14.5 FL (ref 11.5–15)
PLATELET # BLD: 195 E9/L (ref 130–450)
PLATELET # BLD: 208 E9/L (ref 130–450)
PMV BLD AUTO: 11.6 FL (ref 7–12)
PMV BLD AUTO: 12.7 FL (ref 7–12)
POTASSIUM REFLEX MAGNESIUM: 3.9 MMOL/L (ref 3.5–5)
POTASSIUM SERPL-SCNC: 4.4 MMOL/L (ref 3.5–5)
RBC # BLD: 4.38 E12/L (ref 3.5–5.5)
RBC # BLD: 4.59 E12/L (ref 3.5–5.5)
RHEUMATOID FACTOR: 94 IU/ML (ref 0–13)
SEDIMENTATION RATE, ERYTHROCYTE: 22 MM/HR (ref 0–20)
SODIUM BLD-SCNC: 135 MMOL/L (ref 132–146)
SODIUM BLD-SCNC: 139 MMOL/L (ref 132–146)
T4 TOTAL: 6.7 MCG/DL (ref 4.5–11.7)
TOTAL PROTEIN: 7.3 G/DL (ref 6.4–8.3)
TRIGL SERPL-MCNC: 138 MG/DL (ref 0–149)
TSH SERPL DL<=0.05 MIU/L-ACNC: 2.27 UIU/ML (ref 0.27–4.2)
VITAMIN D 25-HYDROXY: 38 NG/ML (ref 30–100)
VLDLC SERPL CALC-MCNC: 28 MG/DL
WBC # BLD: 6 E9/L (ref 4.5–11.5)
WBC # BLD: 7 E9/L (ref 4.5–11.5)

## 2021-04-19 PROCEDURE — 96375 TX/PRO/DX INJ NEW DRUG ADDON: CPT

## 2021-04-19 PROCEDURE — 85378 FIBRIN DEGRADE SEMIQUANT: CPT

## 2021-04-19 PROCEDURE — 80048 BASIC METABOLIC PNL TOTAL CA: CPT

## 2021-04-19 PROCEDURE — 6360000002 HC RX W HCPCS: Performed by: STUDENT IN AN ORGANIZED HEALTH CARE EDUCATION/TRAINING PROGRAM

## 2021-04-19 PROCEDURE — 93970 EXTREMITY STUDY: CPT

## 2021-04-19 PROCEDURE — 71275 CT ANGIOGRAPHY CHEST: CPT

## 2021-04-19 PROCEDURE — 99283 EMERGENCY DEPT VISIT LOW MDM: CPT

## 2021-04-19 PROCEDURE — 85025 COMPLETE CBC W/AUTO DIFF WBC: CPT

## 2021-04-19 PROCEDURE — 6360000004 HC RX CONTRAST MEDICATION: Performed by: RADIOLOGY

## 2021-04-19 PROCEDURE — 96374 THER/PROPH/DIAG INJ IV PUSH: CPT

## 2021-04-19 RX ORDER — DIPHENHYDRAMINE HYDROCHLORIDE 50 MG/ML
25 INJECTION INTRAMUSCULAR; INTRAVENOUS ONCE
Status: COMPLETED | OUTPATIENT
Start: 2021-04-19 | End: 2021-04-19

## 2021-04-19 RX ORDER — METHYLPREDNISOLONE SODIUM SUCCINATE 125 MG/2ML
125 INJECTION, POWDER, LYOPHILIZED, FOR SOLUTION INTRAMUSCULAR; INTRAVENOUS ONCE
Status: COMPLETED | OUTPATIENT
Start: 2021-04-19 | End: 2021-04-19

## 2021-04-19 RX ADMIN — IOPAMIDOL 75 ML: 755 INJECTION, SOLUTION INTRAVENOUS at 19:53

## 2021-04-19 RX ADMIN — DIPHENHYDRAMINE HYDROCHLORIDE 25 MG: 50 INJECTION, SOLUTION INTRAMUSCULAR; INTRAVENOUS at 19:25

## 2021-04-19 RX ADMIN — METHYLPREDNISOLONE SODIUM SUCCINATE 125 MG: 125 INJECTION, POWDER, FOR SOLUTION INTRAMUSCULAR; INTRAVENOUS at 19:23

## 2021-04-19 ASSESSMENT — ENCOUNTER SYMPTOMS
VOMITING: 0
NAUSEA: 0
SHORTNESS OF BREATH: 0
EYE PAIN: 0
DIARRHEA: 0
WHEEZING: 0
ABDOMINAL PAIN: 0
COUGH: 0
RHINORRHEA: 0

## 2021-04-19 NOTE — ED PROVIDER NOTES
Pt Name: Ingrid Neumann  MRN: [id number]  [place] 1960  Date of evaluation: 4/19/2021      CHIEF COMPLAINT       Chief Complaint   Patient presents with    Leg Pain     Pt sent in by Dr. Gaynel Kayser to r/o DVT. Pain to right leg since last week. Had J&J vaccine 4/8. Denies CP/SOB. Denies thinners. Ingrid Neumann is a 56 y.o. female presenting to the ED with chief complaint of leg pain. Patient was sent in by her PCP as she has had pain in her right leg for 1 week. Patient did have the J&J vaccine. He received it on 4/8. She denies chest pain or shortness of breath at this time. Her pain is located in the posterior aspect of her right calf. Her pain is made worse with palpation. She also admits to a week long of shortness of breath. Patient's complains have been constant without any alleviating or exacerbating factors and mild in severity. HPI       Patient has a medical history as listed below:   Past Medical History:   Diagnosis Date    Childhood asthma     Depression with anxiety     Diverticulitis     Fatigue     Fracture, ribs     10 11 12 ribs     History of stomach ulcers     IBS (irritable bowel syndrome)     Low vitamin B12 level     PTSD (post-traumatic stress disorder)     [facility])     right ankle    RA    Weight gain      Patient Active Problem List    Diagnosis Date Noted    Primary osteoarthritis of left shoulder 03/04/2020    Mild intermittent asthma without complication 85/67/3775    Mixed stress and urge urinary incontinence 01/01/2020    Osteoarthritis 02/21/2019    Rheumatoid arthritis ([facility] [address] 02/20/2019    Arthritis of ankle, left 03/21/2018    Arthritis of left ankle 03/20/2018       Review of Systems   Constitutional: Negative for chills and fever. HENT: Negative for congestion and rhinorrhea. Eyes: Negative for pain and visual disturbance. Respiratory: Negative for cough, shortness of breath and wheezing. Cardiovascular: Negative for chest pain, palpitations and leg swelling. Gastrointestinal: Negative for abdominal pain, diarrhea, nausea and vomiting. Endocrine: Negative for polyuria. Genitourinary: Negative for dysuria, frequency and hematuria. Musculoskeletal: Positive for myalgias (Right leg pain). Negative for arthralgias and joint swelling. Skin: Negative for rash. Neurological: Negative for weakness and numbness. Physical Exam  Vitals signs and nursing note reviewed. Constitutional:       Appearance: She is well-developed. HENT:      Head: Normocephalic and atraumatic. Nose: Nose normal.      Mouth/Throat:      Pharynx: Oropharynx is clear. Eyes:      Conjunctiva/sclera: Conjunctivae normal.      Pupils: Pupils are equal, round, and reactive to light. Neck:      Musculoskeletal: Normal range of motion. Cardiovascular:      Rate and Rhythm: Normal rate and regular rhythm. Pulses: Normal pulses. Heart sounds: Normal heart sounds. Pulmonary:      Effort: Pulmonary effort is normal. No respiratory distress. Breath sounds: Normal breath sounds. No wheezing, rhonchi or rales. Abdominal:      General: Abdomen is flat. There is no distension. Palpations: Abdomen is soft. There is no mass. Tenderness: There is no abdominal tenderness. There is no guarding or rebound. Musculoskeletal:         General: No swelling or deformity. Right hip: Normal.      Right knee: She exhibits normal range of motion, no swelling, no effusion, no ecchymosis, no deformity, no laceration, no erythema, normal alignment, no LCL laxity and no bony tenderness. No tenderness found. Right ankle: Normal. No tenderness. Achilles tendon normal.      Right lower leg: No edema. Left lower leg: No edema. Comments: Neurovascularly intact in right lower extremity. Skin:     General: Skin is warm and dry. Neurological:      Mental Status: She is alert. Procedures     MDM           Patient presents to the ED for evaluation. This patient was seen and evaluated with the attending. Work-up was performed with concerns for but not limited to Fracture, dislocation, strain, contusion, sprain and DVT  Lab work and imaging showed elevated D-dimer. Ultrasound showed no blood clots and CT of the chest which was performed after the elevated D-dimer showed no PE. Patient continues to be non-toxic on re-evaluation. Findings were discussed with the patient and reasons to immediately return to the ED were articulated to them. They will follow-up with their PCP.      --------------------------------------------- PAST HISTORY ---------------------------------------------  Past Medical History:  has a past medical history of Childhood asthma, Depression with anxiety, Diverticulitis, Fatigue, Fracture, ribs, History of stomach ulcers, IBS (irritable bowel syndrome), Low vitamin B12 level, PTSD (post-traumatic stress disorder), RA (rheumatoid arthritis) ([facility] [address] and Weight gain. Past Surgical History:  has a past surgical history that includes Carpal tunnel release (Right); Tonsillectomy; Tubal ligation; Knee arthroscopy; Bunionectomy (Bilateral); Foot neuroma surgery; Upper gastrointestinal endoscopy (4/4/2016); other surgical history; pr total ankle replacement (Left, 3/21/2018); Shoulder arthroscopy (Right); Total ankle arthroplasty (Right, 2/20/2019); Achilles tendon surgery (Right, 2/20/2019); Colonoscopy (11/2012); Upper gastrointestinal endoscopy (11/2012); and Carpal tunnel release (Right, 06/2010). Social History:  reports that she quit smoking about 10 years ago. She has a 2.50 pack-year smoking history. She has never used smokeless tobacco. She reports current alcohol use. She reports that she does not use drugs. Family History: Family history is unknown by patient. The patients home medications have been reviewed. Allergies:  Iodides, Percocet [oxycodone-acetaminophen], and Percodan [oxycodone-aspirin]    -------------------------------------------------- RESULTS -------------------------------------------------  Labs:  Results for orders placed or performed during the hospital encounter of 04/19/21   CBC Auto Differential   Result Value Ref Range    WBC 7.0 4.5 - 11.5 E9/L    RBC 4.59 3.50 - 5.50 E12/L    Hemoglobin 12.9 11.5 - 15.5 g/dL    Hematocrit 41.5 34.0 - 48.0 %    MCV 90.4 80.0 - 99.9 fL    MCH 28.1 26.0 - 35.0 pg    MCHC 31.1 (L) 32.0 - 34.5 %    RDW 14.3 11.5 - 15.0 fL    Platelets 231 412 - 248 E9/L    MPV 11.6 7.0 - 12.0 fL    Neutrophils % 49.6 43.0 - 80.0 %    Immature Granulocytes % 0.1 0.0 - 5.0 %    Lymphocytes % 38.6 20.0 - 42.0 %    Monocytes % 7.6 2.0 - 12.0 %    Eosinophils % 3.7 0.0 - 6.0 %    Basophils % 0.4 0.0 - 2.0 %    Neutrophils Absolute 3.44 1.80 - 7.30 E9/L    Immature Granulocytes # 0.01 E9/L    Lymphocytes Absolute 2.69 1.50 - 4.00 E9/L    Monocytes Absolute 0.53 0.10 - 0.95 E9/L    Eosinophils Absolute 0.26 0.05 - 0.50 E9/L    Basophils Absolute 0.03 0.00 - 0.20 G9/Y   Basic Metabolic Panel w/ Reflex to MG   Result Value Ref Range    Sodium 139 132 - 146 mmol/L    Potassium reflex Magnesium 3.9 3.5 - 5.0 mmol/L    Chloride 102 98 - 107 mmol/L    CO2 23 22 - 29 mmol/L    Anion Gap 14 7 - 16 mmol/L    Glucose 92 74 - 99 mg/dL    BUN 12 8 - 23 mg/dL    CREATININE 0.7 0.5 - 1.0 mg/dL    GFR Non-African American >60 >=60 mL/min/1.73    GFR African American >60     Calcium 9.9 8.6 - 10.2 mg/dL   D-Dimer, Quantitative   Result Value Ref Range    D-Dimer, Quant 823 ng/mL DDU       Radiology:  CTA PULMONARY W CONTRAST   Final Result   No evidence of pulmonary embolism or acute pulmonary abnormality.          US DUP LOWER EXTREMITIES BILATERAL VENOUS   Final Result   No evidence of DVT in either lower extremity.             ------------------------- NURSING NOTES AND VITALS REVIEWED ---------------------------  Date / Time Roomed: 4/19/2021  5:07 PM  ED Bed Assignment:  [id number]    The nursing notes within the ED encounter and vital signs as below have been reviewed. BP (!) 167/75   Pulse 73   Temp 97.8 °F (36.6 °C) (Oral)   Resp 14   Ht 5' 4\" (1.626 m)   Wt 190 lb (86.2 kg)   SpO2 98%   BMI 32.61 kg/m²           --------------------------------- ADDITIONAL PROVIDER NOTES ---------------------------------  At this time the patient is without objective evidence of an acute process requiring hospitalization or inpatient management. They have remained hemodynamically stable throughout their entire ED visit and are stable for discharge with outpatient follow-up. The plan has been discussed in detail and they are aware of the specific conditions for emergent return, as well as the importance of follow-up. New Prescriptions    No medications on file       Diagnosis:  1. Right leg pain    2. Dyspnea and respiratory abnormalities        Disposition:  Patient's disposition: Discharge  Patient's condition is stable. NOTE:  This report was transcribed using voice recognition software. Efforts were made to ensure accuracy; however, inadvertent computerized transcription errors may be present.            Maico Atwood DO  Resident  04/19/21 2031

## 2021-04-19 NOTE — TELEPHONE ENCOUNTER
Tell patient the  test for blood clots is elevated a little bit. She will have to go to the emergency room and tell them that I did a blood test that showed she has a blood clot somewhere in her system.

## 2021-04-19 NOTE — ED NOTES
Radiology Procedure Waiver   Name: Brooks Cabrera  : 1960  MRN: [id number]    Date:  21    Time: 7:47 PM EDT    Benefits of immediately proceeding with Radiology exam(s) without pre-testing outweigh the risks or are not indicated as specified below and therefore the following is/are being waived:    [] Pregnancy test   [] Patients LMP on-time and regular.   [] Patient had Tubal Ligation or has other Contraception Device. [] Patient  is Menopausal or Premenarcheal.    [] Patient had Full or Partial Hysterectomy. [x] Protocol for Iodine allergy    [] MRI Questionnaire     [] BUN/Creatinine   [] Patient age w/no hx of renal dysfunction. [] Patient on Dialysis. [] Recent Normal Labs.   Electronically signed by Hansel House DO on 21 at 7:47 PM EDT               Hansel House DO  Resident  [phone]

## 2021-04-20 ENCOUNTER — TELEPHONE (OUTPATIENT)
Dept: PRIMARY CARE CLINIC | Age: 61
End: 2021-04-20

## 2021-04-20 LAB — ANTI-NUCLEAR ANTIBODY (ANA): NEGATIVE

## 2021-04-20 NOTE — TELEPHONE ENCOUNTER
Pt was in the er for her abnormal d-dimer results. She wants to know if you need her to follow up with you to discuss results of her labs.

## 2021-04-26 ENCOUNTER — OFFICE VISIT (OUTPATIENT)
Dept: PRIMARY CARE CLINIC | Age: 61
End: 2021-04-26
Payer: MEDICARE

## 2021-04-26 VITALS
BODY MASS INDEX: 32.44 KG/M2 | OXYGEN SATURATION: 96 % | SYSTOLIC BLOOD PRESSURE: 126 MMHG | DIASTOLIC BLOOD PRESSURE: 82 MMHG | TEMPERATURE: 97.2 F | WEIGHT: 189 LBS | HEART RATE: 92 BPM

## 2021-04-26 DIAGNOSIS — H53.9 CHANGE IN VISION: ICD-10-CM

## 2021-04-26 DIAGNOSIS — M05.79 RHEUMATOID ARTHRITIS INVOLVING MULTIPLE SITES WITH POSITIVE RHEUMATOID FACTOR (HCC): Primary | Chronic | ICD-10-CM

## 2021-04-26 DIAGNOSIS — R79.89 ELEVATED D-DIMER: ICD-10-CM

## 2021-04-26 DIAGNOSIS — M17.12 PRIMARY OSTEOARTHRITIS OF LEFT KNEE: ICD-10-CM

## 2021-04-26 DIAGNOSIS — Z01.818 PRE-OPERATIVE CLEARANCE: ICD-10-CM

## 2021-04-26 PROCEDURE — 99214 OFFICE O/P EST MOD 30 MIN: CPT | Performed by: FAMILY MEDICINE

## 2021-04-26 NOTE — PROGRESS NOTES
MOVED INTO [facility]---    LEFT ANKLE REPLACEMENT 3-21-18 DR Lala Ventura----    RIGHT ANKLE REPLACEMENT 2-19 DR Lala Ventura    Left shoulder OR  3-20  Dr Griselda Zhang    Left total knee   dr Spike Bond with Jefe Goyal cousin    Covid  vaccine   4-8-21   Brenda   eelv   dimer and sent to er       Past Medical History:   Diagnosis Date    Childhood asthma     Depression with anxiety     Diverticulitis     Fatigue     Fracture, ribs     10 11 12 ribs     History of stomach ulcers     IBS (irritable bowel syndrome)     Low vitamin B12 level     PTSD (post-traumatic stress disorder)     RA (rheumatoid arthritis) ([facility] [address]     right ankle    RA    Weight gain      Family History   Family history unknown: Yes      Past Surgical History:   Procedure Laterality Date    ACHILLES TENDON SURGERY Right 2/20/2019    RIGHT ACHILLES TENDON LENGTHENING performed by Raudel Ferguson MD at [place] [address] Bilateral    [address] Right     CARPAL TUNNEL RELEASE Right 06/2010    COLONOSCOPY  11/2012    severe TICS Sigmoid and Pan Tic     FOOT NEUROMA SURGERY      bilat    KNEE ARTHROSCOPY      left    OTHER SURGICAL HISTORY      excision neck mass    [place] TOTAL ANKLE REPLACEMENT Left 3/21/2018    LEFT TOTAL ANKLE REPLACEMENT LEFT FOOT CHEILECTOMY   ([facility]) performed by Raudel Ferguson MD at [address] [place] [address] ARTHROSCOPY Right     TONSILLECTOMY      TOTAL ANKLE ARTHROPLASTY Right 2/20/2019    RIGHT ANKLE TOTAL ARTHROPLASTY performed by Raudel Ferguson MD at [place] [address]  4/4/2016    UPPER GASTROINTESTINAL ENDOSCOPY  11/2012    repeated 02-13 and improved       Vitals:    04/26/21 1254   BP: 126/82   Pulse: 92   Temp: 97.2 °F (36.2 °C)   SpO2: 96%   Weight: 189 lb (85.7 kg)       Objective:    Physical Exam  Vitals signs reviewed. Constitutional:       Appearance: She is well-developed. HENT:      Head: Normocephalic.    Eyes:

## 2021-05-03 ENCOUNTER — TELEPHONE (OUTPATIENT)
Dept: ADMINISTRATIVE | Age: 61
End: 2021-05-03

## 2021-05-04 ENCOUNTER — OFFICE VISIT (OUTPATIENT)
Dept: CARDIOLOGY CLINIC | Age: 61
End: 2021-05-04
Payer: MEDICARE

## 2021-05-04 VITALS
HEART RATE: 76 BPM | RESPIRATION RATE: 18 BRPM | WEIGHT: 189.6 LBS | SYSTOLIC BLOOD PRESSURE: 116 MMHG | HEIGHT: 64 IN | BODY MASS INDEX: 32.37 KG/M2 | DIASTOLIC BLOOD PRESSURE: 71 MMHG

## 2021-05-04 DIAGNOSIS — M17.12 PRIMARY OSTEOARTHRITIS OF LEFT KNEE: ICD-10-CM

## 2021-05-04 DIAGNOSIS — Z01.810 PREOP CARDIOVASCULAR EXAM: Primary | ICD-10-CM

## 2021-05-04 PROBLEM — M19.90 OSTEOARTHRITIS: Chronic | Status: RESOLVED | Noted: 2019-02-21 | Resolved: 2021-05-04

## 2021-05-04 PROBLEM — M19.072 ARTHRITIS OF ANKLE, LEFT: Chronic | Status: RESOLVED | Noted: 2018-03-21 | Resolved: 2021-05-04

## 2021-05-04 PROCEDURE — 93000 ELECTROCARDIOGRAM COMPLETE: CPT | Performed by: INTERNAL MEDICINE

## 2021-05-04 PROCEDURE — 99204 OFFICE O/P NEW MOD 45 MIN: CPT | Performed by: INTERNAL MEDICINE

## 2021-05-04 RX ORDER — ZOLPIDEM TARTRATE 5 MG/1
2.5 TABLET ORAL NIGHTLY PRN
COMMUNITY

## 2021-05-04 RX ORDER — MULTIVIT WITH MINERALS/LUTEIN
250 TABLET ORAL DAILY
COMMUNITY

## 2021-05-04 RX ORDER — CALCIUM CARBONATE 500(1250)
500 TABLET ORAL DAILY
COMMUNITY

## 2021-05-04 RX ORDER — VIT C/B6/B5/MAGNESIUM/HERB 173 50-5-6-5MG
CAPSULE ORAL DAILY
COMMUNITY

## 2021-05-04 RX ORDER — MULTIVIT WITH MINERALS/LUTEIN
1000 TABLET ORAL DAILY
COMMUNITY

## 2021-05-04 RX ORDER — MULTIVIT-MIN/IRON/FOLIC ACID/K 18-600-40
CAPSULE ORAL DAILY
COMMUNITY

## 2021-05-04 RX ORDER — FLUOXETINE HYDROCHLORIDE 20 MG/1
20 CAPSULE ORAL 3 TIMES DAILY
COMMUNITY

## 2021-05-04 RX ORDER — CHLORAL HYDRATE 500 MG
CAPSULE ORAL DAILY
COMMUNITY

## 2021-05-04 NOTE — PROGRESS NOTES
Weight: 189 lb 9.6 oz (86 kg)   Height: 5' 4\" (1.626 m)       Social History     Socioeconomic History    Marital status:      Spouse name: Not on file    Number of children: Not on file    Years of education: Not on file    Highest education level: Not on file   Occupational History    Not on file   Social Needs    Financial resource strain: Not on file    Food insecurity     Worry: Not on file     Inability: Not on file    Transportation needs     Medical: Not on file     Non-medical: Not on file   Tobacco Use    Smoking status: Former Smoker     Packs/day: 0.25     Years: 10.00     Pack years: 2.50     Quit date: 2010     Years since quitting: 10.4    Smokeless tobacco: Never Used   Substance and Sexual Activity    Alcohol use: Yes     Comment: social    Drug use: No    Sexual activity: Not on file   Lifestyle    Physical activity     Days per week: Not on file     Minutes per session: Not on file    Stress: Not on file   Relationships    Social connections     Talks on phone: Not on file     Gets together: Not on file     Attends Jain service: Not on file     Active member of club or organization: Not on file     Attends meetings of clubs or organizations: Not on file     Relationship status: Not on file    Intimate partner violence     Fear of current or ex partner: Not on file     Emotionally abused: Not on file     Physically abused: Not on file     Forced sexual activity: Not on file   Other Topics Concern    Not on file   Social History Narrative        DEPRESSION. RIGHT ANKLE SURGERIES TIMES THREE WITH CHRONIC SEVERE PAIN DUE TO INJURY FROM    . FATIGUE. Yesi Jennings  1960 Page #2    WEIGHT GAIN. FAMILY HISTORY OF DIABETES. IRRITABLE BOWEL SYNDROME. DEATH OF A SON 2 YEARS AGO. PROBABLY CARPAL TUNNEL SYNDROME. LEFT POSTERIOR NECK MASS.     POS RA ---DR SHEARER-----[facility]---TOLD NO RA BUT ONLY FIBROMYALGIA--    CHANGED TO DR VEGA---PT REFUSING EMBREL    LOW B-12 7-    SON IN [place]    FX L 10 11 12 RIBS    CTS OR 2010---RIGHT    COLON ---SEVERE TICS SIGMOID AND PAN TIC    EGD  GASTRITIS REPEATED IN  AND IMPROVED---    SMOKER QUIT AGE 46    EGD DR CHAUDHARI 3-16 AND SETTING UP PH STUDY    20 YR OL D SON SUICIDE 2017 IN [place]-----PT SON HAS 5 KIDS    MOM  2017---AT AGE 80    TWO SONS--ONE -----OTHER MOVED FROM [place] TO [place] AND MOVED INTO [facility]---    LEFT ANKLE REPLACEMENT 3-21- DR Figueroa Solders----    RIGHT ANKLE REPLACEMENT  DR Henry Altamirano    Left shoulder OR  3-20  Dr Crystal Page    Left total knee   dr Villalpando Men with Everlena Reap cousin    Covid  vaccine   21   [place]   eelv   dimer and sent to er        Family History   Family history unknown: Yes         SUBJECTIVE: Stephanie Ndiaye presents to the office today for consult - dr Tico Love - for pre-op evaluation prior to knee surgery. She complains of no new or unstable cardiac symptoms and denies   chest pain, chest pressure/discomfort, claudication, dyspnea, exertional chest pressure/discomfort, fatigue, irregular heart beat, lower extremity edema, near-syncope, orthopnea, palpitations, paroxysmal nocturnal dyspnea, syncope and tachypnea. Despite her knee pain she does all indoor and outdoor chores and walks her dogs. Had recent neg US lower ext and CTA given elevated D-dimer. Had recently gotten COVID vaccine. Review of Systems:   Heart: as above   Lungs: as above   Eyes: denies changes in vision or discharge. Ears: denies changes in hearing or pain. Nose: denies epistaxis or masses   Throat: denies sore throat or trouble swallowing. Neuro: denies numbness, tingling, tremors. Skin: denies rashes or itching. : denies hematuria, dysuria   GI: denies vomiting, diarrhea   Psych: denies mood changed, anxiety, depression. all others negative.     Physical Exam   /71   Pulse 76   Resp 18   Ht 5' 4\" (1.626 m)   Wt 189 lb 9.6 oz (86 kg)   BMI 32.54 kg/m²   Constitutional: Oriented to person, place, and time. Well-developed and well-nourished. No distress. Head: Normocephalic and atraumatic. Eyes: EOM are normal. Pupils are equal, round, and reactive to light. Neck: Normal range of motion. Neck supple. No hepatojugular reflux and no JVD present. Carotid bruit is not present. No tracheal deviation present. No thyromegaly present. Cardiovascular: Normal rate, regular rhythm, normal heart sounds and intact distal pulses. Exam reveals no gallop and no friction rub. No murmur heard. Pulmonary/Chest: Effort normal and breath sounds normal. No respiratory distress. No wheezes. No rales. No tenderness. Abdominal: Soft. Bowel sounds are normal. No distension and no mass. No tenderness. No rebound and no guarding. Musculoskeletal:  No edema and no tenderness. Neurological: Alert and oriented to person, place, and time. Skin: Skin is warm and dry. No rash noted. Not diaphoretic. No erythema. Psychiatric: Normal mood and affect. Behavior is normal.     EKG:  normal EKG, normal sinus rhythm, rate 76, axis +34. ASSESSMENT AND PLAN:  Patient Active Problem List   Diagnosis    Arthritis of left ankle    Rheumatoid arthritis ([address]    Mild intermittent asthma without complication    Mixed stress and urge urinary incontinence    Primary osteoarthritis of left shoulder    Primary osteoarthritis of left knee    Change in vision    Elevated d-dimer     ·  Patient has no high risk clinical predictors of an adverse outcome in surgery, such as recent myocardial infarction, unstable angina, heart failure, rhythm other than sinus, severe obstructive valvular disease, INSULIN, CKD, CVA  · Able to achieve 6.1 METS by [facility] ACTIVITY INDEX  · Normal CV exam and ekg  · RCRI Class I risk (< 1% chance of cardiac complications).        Khalida Brandon M.D  [id number] [facility]

## 2021-05-11 ENCOUNTER — OFFICE VISIT (OUTPATIENT)
Dept: FAMILY MEDICINE CLINIC | Age: 61
End: 2021-05-11
Payer: MEDICARE

## 2021-05-11 VITALS
HEART RATE: 66 BPM | RESPIRATION RATE: 16 BRPM | BODY MASS INDEX: 32.61 KG/M2 | WEIGHT: 191 LBS | SYSTOLIC BLOOD PRESSURE: 120 MMHG | HEIGHT: 64 IN | OXYGEN SATURATION: 99 % | TEMPERATURE: 97.3 F | DIASTOLIC BLOOD PRESSURE: 74 MMHG

## 2021-05-11 DIAGNOSIS — J01.90 ACUTE NON-RECURRENT SINUSITIS, UNSPECIFIED LOCATION: Primary | ICD-10-CM

## 2021-05-11 DIAGNOSIS — H66.92 LEFT OTITIS MEDIA, UNSPECIFIED OTITIS MEDIA TYPE: ICD-10-CM

## 2021-05-11 DIAGNOSIS — R09.82 POSTNASAL DRIP: ICD-10-CM

## 2021-05-11 DIAGNOSIS — R07.0 PAIN IN THROAT: ICD-10-CM

## 2021-05-11 DIAGNOSIS — R09.81 NASAL CONGESTION: ICD-10-CM

## 2021-05-11 PROCEDURE — 99213 OFFICE O/P EST LOW 20 MIN: CPT | Performed by: PHYSICIAN ASSISTANT

## 2021-05-11 RX ORDER — CHLORPHENIRAMINE MALEATE 4 MG/1
4 TABLET ORAL EVERY 6 HOURS PRN
Qty: 20 TABLET | Refills: 0 | Status: SHIPPED
Start: 2021-05-11 | End: 2021-05-24 | Stop reason: SDUPTHER

## 2021-05-11 RX ORDER — METHYLPREDNISOLONE 4 MG/1
TABLET ORAL
Qty: 1 KIT | Refills: 0 | Status: SHIPPED
Start: 2021-05-11 | End: 2021-11-16

## 2021-05-11 RX ORDER — AMOXICILLIN 875 MG/1
875 TABLET, COATED ORAL 2 TIMES DAILY
Qty: 20 TABLET | Refills: 0 | Status: SHIPPED | OUTPATIENT
Start: 2021-05-11 | End: 2021-05-21

## 2021-05-11 NOTE — PROGRESS NOTES
21  Yesi Jennings : 1960 Sex: female  Age 56 y.o. Subjective:  Chief Complaint   Patient presents with    Sinus Problem     left ear pain, sinus drainage          HPI:   Yesi Jennings , 56 y.o. female presents to express care for evaluation of left ear pain, sinus drainage, sore throat and sneezing. The patient has had these symptoms ongoing since yesterday. The patient has had some scratching and irritation noted. The patient is having some swollen glands and sneezing quite a bit. No chest pain, shortness of breath, back pain, flank pain. The patient is eating and drinking normally. Not currently on any antibiotics. The patient denies any lightheadedness or dizziness. ROS:   Unless otherwise stated in this report the patient's positive and negative responses for review of systems for constitutional, eyes, ENT, cardiovascular, respiratory, gastrointestinal, neurological, , musculoskeletal, and integument systems and related systems to the presenting problem are either stated in the history of present illness or were not pertinent or were negative for the symptoms and/or complaints related to the presenting medical problem. Positives and pertinent negatives as per HPI. All others reviewed and are negative.       PMH:     Past Medical History:   Diagnosis Date    Childhood asthma     Depression with anxiety     Diverticulitis     Fatigue     Fracture, ribs     10 11 12 ribs     History of stomach ulcers     IBS (irritable bowel syndrome)     Low vitamin B12 level     PTSD (post-traumatic stress disorder)     RA [facility]rheumatoid [facility]) ([facility])     right ankle    RA    Weight gain        Past Surgical History:   Procedure Laterality Date    ACHILLES TENDON SURGERY Right 2019    RIGHT ACHILLES TENDON LENGTHENING performed by Jaycob Qureshi MD at [address] [place] [address] Bilateral     CARPAL TUNNEL RELEASE Right     CARPAL TUNNEL RELEASE Right 2010    COLONOSCOPY  11/2012    severe TICS Sigmoid and Pan Tic     FOOT NEUROMA SURGERY      bilat    KNEE ARTHROSCOPY      left    OTHER SURGICAL HISTORY      excision neck mass    [place] TOTAL ANKLE REPLACEMENT Left 3/21/2018    LEFT TOTAL ANKLE REPLACEMENT LEFT FOOT CHEILECTOMY   ([facility]) performed by Anushka Feng MD at [address] [place] [address] ARTHROSCOPY Right     TONSILLECTOMY      TOTAL ANKLE ARTHROPLASTY Right 2/20/2019    RIGHT ANKLE TOTAL ARTHROPLASTY performed by Anushka Feng MD at [address] [place] [address] ENDOSCOPY  4/4/2016    UPPER GASTROINTESTINAL ENDOSCOPY  11/2012    repeated 02-13 and improved        Family History   Family history unknown: Yes       Medications:     Current Outpatient Medications:     FLUoxetine (PROZAC) 20 MG capsule, Take 20 mg by mouth 3 times daily, Disp: , Rfl:     zolpidem (AMBIEN) 5 MG tablet, Take 2.5 mg by mouth nightly as needed for Sleep., Disp: , Rfl:     calcium carbonate (OSCAL) 500 MG TABS tablet, Take 500 mg by mouth daily, Disp: , Rfl:     Multiple Vitamins-Minerals (CENTRUM ADULTS PO), Take by mouth daily, Disp: , Rfl:     Omega-3 1000 MG CAPS, Take by mouth daily, Disp: , Rfl:     Cholecalciferol (VITAMIN D) 50 MCG (2000 UT) CAPS capsule, Take by mouth daily, Disp: , Rfl:     vitamin E 1000 units capsule, Take 1,000 Units by mouth daily, Disp: , Rfl:     Turmeric 500 MG CAPS, Take by mouth daily, Disp: , Rfl:     Ascorbic Acid (VITAMIN C) 250 MG tablet, Take 250 mg by mouth daily, Disp: , Rfl:     albuterol sulfate HFA (VENTOLIN HFA) 108 (90 Base) MCG/ACT inhaler, Inhale 2 puffs into the lungs 4 times daily as needed for Wheezing, Disp: 1 Inhaler, Rfl: 5    Allergies:      Allergies   Allergen Reactions    Iodides     Percocet [Oxycodone-Acetaminophen] Nausea And Vomiting and Other (See Comments)     Felt like \"bugs crawling on skin\"    Percodan [Oxycodone-Aspirin] Nausea And Vomiting       Social History: Social History     Tobacco Use    Smoking status: Former Smoker     Packs/day: 0.25     Years: 10.00     Pack years: 2.50     Quit date: 11/9/2010     Years since quitting: 10.5    Smokeless tobacco: Never Used   Substance Use Topics    Alcohol use: Yes     Comment: social    Drug use: No       Patient lives at home. Physical Exam:     Vitals:    05/11/21 1346   BP: 120/74   Pulse: 66   Resp: 16   Temp: 97.3 °F (36.3 °C)   SpO2: 99%   Weight: 191 lb (86.6 kg)   Height: 5' 4\" (1.626 m)       Exam:  Physical Exam  Nurse's notes and vital signs reviewed. The patient is not hypoxic. ? General: Alert, no acute distress, patient resting comfortably Patient is not toxic or lethargic. Skin: Warm, intact, no pallor noted. There is no evidence of rash at this time. Head: Normocephalic, atraumatic  Eye: Normal conjunctiva  Ears, Nose, Throat: Right tympanic membrane clear, left tympanic membrane minimal erythema, no evidence of perforation. No drainage or discharge noted. No pre- or post-auricular tenderness, erythema, or swelling noted. Nasal congestion, rhinorrhea, no epistaxis  Posterior oropharynx shows erythema and cobblestoning but no evidence of tonsillar hypertrophy, or exudate. the uvula is midline. No trismus or drooling is noted. Moist mucous membranes. Neck: No anterior/posterior lymphadenopathy noted. No erythema, no masses, no fluctuance or induration noted. No meningeal signs. Cardiovascular: Regular Rate and Rhythm  Respiratory: No acute distress, no rhonchi, wheezing or crackles noted. No stridor or retractions are noted. Neurological: A&O x4, normal speech  Psychiatric: Cooperative         Testing:     No results found for this visit on 05/11/21. Medical Decision Making:     Vital signs reviewed    Past medical history reviewed. Allergies reviewed. Medications reviewed. Patient on arrival does not appear to be in any apparent distress or discomfort.   The patient has been seen and evaluated. The patient does not appear to be toxic or lethargic. Patient will be treated with amoxicillin, chlorphentermine and Medrol Dosepak. The patient was educated on the proper dosage of motrin and tylenol and the appropriate intervals of each. The patient is to increase fluid intake over the next several days. The patient is to use OTC decongestant as needed. The patient is to return to express care or go directly to the emergency department should any of the signs or symptoms worsen. The patient is to followup with primary care physician in 2-3 days for repeat evaluation. The patient has no other questions or concerns at this time the patient will be discharged home. Clinical Impression:   Yesi was seen today for sinus problem. Diagnoses and all orders for this visit:    Acute non-recurrent sinusitis, unspecified location    Postnasal drip    Pain in throat    Left otitis media, unspecified otitis media type    Nasal congestion    Other orders  -     amoxicillin (AMOXIL) 875 MG tablet; Take 1 tablet by mouth 2 times daily for 10 days  -     chlorpheniramine (ALLER-CHLOR) 4 MG tablet; Take 1 tablet by mouth every 6 hours as needed for Allergies  -     methylPREDNISolone (MEDROL DOSEPACK) 4 MG tablet; Take by mouth. The patient is to call for any concerns or return if any of the signs or symptoms worsen. The patient is to follow-up with PCP in the next 2-3 days for repeat evaluation repeat assessment or go directly to the emergency department.      SIGNATURE: Yomi Jimenez III, PA-C

## 2021-05-24 RX ORDER — CHLORPHENIRAMINE MALEATE 4 MG/1
4 TABLET ORAL EVERY 6 HOURS PRN
Qty: 120 TABLET | Refills: 2 | Status: SHIPPED
Start: 2021-05-24 | End: 2021-11-16

## 2021-06-01 ENCOUNTER — HOSPITAL ENCOUNTER (OUTPATIENT)
Dept: CT IMAGING | Age: 61
Discharge: HOME | End: 2021-06-03
Payer: MEDICARE

## 2021-06-01 DIAGNOSIS — M17.12 OSTEOARTHRITIS OF LEFT KNEE, UNSPECIFIED OSTEOARTHRITIS TYPE: ICD-10-CM

## 2021-06-01 PROCEDURE — 73700 CT LOWER EXTREMITY W/O DYE: CPT

## 2021-06-08 ENCOUNTER — OFFICE VISIT (OUTPATIENT)
Dept: PRIMARY CARE CLINIC | Age: 61
End: 2021-06-08
Payer: MEDICARE

## 2021-06-08 DIAGNOSIS — M17.12 PRIMARY OSTEOARTHRITIS OF LEFT KNEE: ICD-10-CM

## 2021-06-08 DIAGNOSIS — M05.79 RHEUMATOID ARTHRITIS INVOLVING MULTIPLE SITES WITH POSITIVE RHEUMATOID FACTOR (HCC): Chronic | ICD-10-CM

## 2021-06-08 DIAGNOSIS — Z01.818 PRE-OP EXAMINATION: ICD-10-CM

## 2021-06-08 DIAGNOSIS — J45.20 MILD INTERMITTENT ASTHMA WITHOUT COMPLICATION: Chronic | ICD-10-CM

## 2021-06-08 DIAGNOSIS — M05.79 RHEUMATOID ARTHRITIS INVOLVING MULTIPLE SITES WITH POSITIVE RHEUMATOID FACTOR (HCC): Primary | Chronic | ICD-10-CM

## 2021-06-08 LAB
ANION GAP SERPL CALCULATED.3IONS-SCNC: 11 MMOL/L (ref 7–16)
BASOPHILS ABSOLUTE: 0.04 E9/L (ref 0–0.2)
BASOPHILS RELATIVE PERCENT: 0.7 % (ref 0–2)
BUN BLDV-MCNC: 12 MG/DL (ref 6–23)
CALCIUM SERPL-MCNC: 9.3 MG/DL (ref 8.6–10.2)
CHLORIDE BLD-SCNC: 103 MMOL/L (ref 98–107)
CO2: 25 MMOL/L (ref 22–29)
CREAT SERPL-MCNC: 0.6 MG/DL (ref 0.5–1)
EOSINOPHILS ABSOLUTE: 0.21 E9/L (ref 0.05–0.5)
EOSINOPHILS RELATIVE PERCENT: 3.8 % (ref 0–6)
GFR AFRICAN AMERICAN: >60
GFR NON-AFRICAN AMERICAN: >60 ML/MIN/1.73
GLUCOSE BLD-MCNC: 98 MG/DL (ref 74–99)
HCT VFR BLD CALC: 39.2 % (ref 34–48)
HEMOGLOBIN: 12.3 G/DL (ref 11.5–15.5)
IMMATURE GRANULOCYTES #: 0.02 E9/L
IMMATURE GRANULOCYTES %: 0.4 % (ref 0–5)
LYMPHOCYTES ABSOLUTE: 1.46 E9/L (ref 1.5–4)
LYMPHOCYTES RELATIVE PERCENT: 26.5 % (ref 20–42)
MCH RBC QN AUTO: 28.5 PG (ref 26–35)
MCHC RBC AUTO-ENTMCNC: 31.4 % (ref 32–34.5)
MCV RBC AUTO: 91 FL (ref 80–99.9)
MONOCYTES ABSOLUTE: 0.41 E9/L (ref 0.1–0.95)
MONOCYTES RELATIVE PERCENT: 7.5 % (ref 2–12)
NEUTROPHILS ABSOLUTE: 3.36 E9/L (ref 1.8–7.3)
NEUTROPHILS RELATIVE PERCENT: 61.1 % (ref 43–80)
PDW BLD-RTO: 14.6 FL (ref 11.5–15)
PLATELET # BLD: 205 E9/L (ref 130–450)
PMV BLD AUTO: 12 FL (ref 7–12)
POTASSIUM SERPL-SCNC: 4 MMOL/L (ref 3.5–5)
RBC # BLD: 4.31 E12/L (ref 3.5–5.5)
SODIUM BLD-SCNC: 139 MMOL/L (ref 132–146)
WBC # BLD: 5.5 E9/L (ref 4.5–11.5)

## 2021-06-08 PROCEDURE — 99214 OFFICE O/P EST MOD 30 MIN: CPT | Performed by: FAMILY MEDICINE

## 2021-06-08 ASSESSMENT — PATIENT HEALTH QUESTIONNAIRE - PHQ9
SUM OF ALL RESPONSES TO PHQ9 QUESTIONS 1 & 2: 0
2. FEELING DOWN, DEPRESSED OR HOPELESS: 0
SUM OF ALL RESPONSES TO PHQ QUESTIONS 1-9: 0
1. LITTLE INTEREST OR PLEASURE IN DOING THINGS: 0

## 2021-06-08 ASSESSMENT — ENCOUNTER SYMPTOMS
ALLERGIC/IMMUNOLOGIC NEGATIVE: 1
GASTROINTESTINAL NEGATIVE: 1
RESPIRATORY NEGATIVE: 1
EYES NEGATIVE: 1

## 2021-06-08 NOTE — PROGRESS NOTES
Felt like \"bugs crawling on skin\"    Percodan [Oxycodone-Aspirin] Nausea And Vomiting     Social History     Socioeconomic History    Marital status:      Spouse name: Not on file    Number of children: Not on file    Years of education: Not on file    Highest education level: Not on file   Occupational History    Not on file   Tobacco Use    Smoking status: Former Smoker     Packs/day: 0.25     Years: 10.00     Pack years: 2.50     Quit date: 2010     Years since quitting: 10.5    Smokeless tobacco: Never Used   Vaping Use    Vaping Use: Never used   Substance and Sexual Activity    Alcohol use: Yes     Comment: social    Drug use: No    Sexual activity: Not on file   Other Topics Concern    Not on file   Social History Narrative        DEPRESSION. RIGHT ANKLE SURGERIES TIMES THREE WITH CHRONIC SEVERE PAIN DUE TO INJURY FROM    . FATIGUE. Yesi EZE Dick  1960 Page #2    WEIGHT GAIN. FAMILY HISTORY OF DIABETES. IRRITABLE BOWEL SYNDROME. DEATH OF A SON 2 YEARS AGO. PROBABLY CARPAL TUNNEL SYNDROME. LEFT POSTERIOR NECK MASS.     POS RA ---DR SHEARER-----[facility]---TOLD NO RA BUT ONLY FIBROMYALGIA--    CHANGED TO DR VEGA---PT REFUSING EMBREL    LOW B-12     SON IN [place]    FX L 10 11 12 RIBS    CTS OR 2010---RIGHT    COLON ---SEVERE TICS SIGMOID AND PAN TIC    EGD  GASTRITIS REPEATED IN  AND IMPROVED---    SMOKER QUIT AGE 46    EGD DR CHAUDHARI 3-16 AND SETTING UP PH STUDY    20 YR OL D SON SUICIDE  IN [place]-----PT SON HAS 5 KIDS    MOM  2017---AT AGE 80    TWO SONS--ONE -----OTHER MOVED FROM [place] TO [place] AND MOVED INTO [facility]---    LEFT ANKLE REPLACEMENT 3-21-18 DR Padmaja Parrish----    RIGHT ANKLE REPLACEMENT  DR Padmaja Parrish    Left shoulder OR  3-20  Dr Lydia Decker    Left total knee   dr Kamille Mims with Rima Sikh cousin    [facility] to [facility] pre op clearance       Left  TKA  dr Darling Villalpando    6-14-21     Social Determinants of Health     Financial Resource Strain:     Difficulty of Paying Living Expenses:    Food Insecurity:     Worried About Running Out of Food in the Last Year:     [address] in the Last Year:    Transportation Needs:     Lack of Transportation (Medical):      Lack of Transportation (Non-Medical):    Physical Activity:     Days of Exercise per Week:     Minutes of Exercise per Session:    Stress:     Feeling of Stress :    Social Connections:     Frequency of Communication with Friends and Family:     Frequency of Social Gatherings with Friends and Family:     Attends Synagogue Services:     Active Member of Clubs or Organizations:     Attends Club or Organization Meetings:     Marital Status:    Intimate Partner Violence:     Fear of Current or Ex-Partner:     Emotionally Abused:     Physically Abused:     Sexually Abused:       Past Medical History:   Diagnosis Date    Childhood asthma     Depression with anxiety     Diverticulitis     Fatigue     Fracture, ribs     10 11 12 ribs     History of stomach ulcers     IBS (irritable bowel syndrome)     Low vitamin B12 level     PTSD (post-traumatic stress disorder)     RA (rheumatoid arthritis) ([address]     right ankle    RA    Weight gain      Family History   Family history unknown: Yes      Past Surgical History:   Procedure Laterality Date    ACHILLES TENDON SURGERY Right 2/20/2019    RIGHT ACHILLES TENDON LENGTHENING performed by Eric Wright MD at [facility] Bilateral    [address] Right     CARPAL TUNNEL RELEASE Right 06/2010    COLONOSCOPY  11/2012    severe TICS Sigmoid and Pan Tic     FOOT NEUROMA SURGERY      bilat    KNEE ARTHROSCOPY      left    OTHER SURGICAL HISTORY      excision neck mass    [place] TOTAL ANKLE REPLACEMENT Left 3/21/2018    LEFT TOTAL ANKLE REPLACEMENT LEFT FOOT CHEILECTOMY   ([facility]) performed [facility]kshat [facility] ARTHROSCOPY Right     TONSILLECTOMY      TOTAL ANKLE ARTHROPLASTY Right 2/20/2019    RIGHT ANKLE TOTAL ARTHROPLASTY performed by Akshat Ramirez MD at [place] [address]  4/4/2016    UPPER GASTROINTESTINAL ENDOSCOPY  11/2012    repeated 02-13 and improved       Vitals:    06/08/21 1029   BP: (!) 218/78   Temp: 98.7 °F (37.1 °C)   TempSrc: Oral   Weight: 189 lb (85.7 kg)   Height: 5' 4\" (1.626 m)       Objective:    Physical Exam  Vitals reviewed. Constitutional:       Appearance: She is well-developed. HENT:      Head: Normocephalic. Eyes:      Pupils: Pupils are equal, round, and reactive to light. Cardiovascular:      Rate and Rhythm: Normal rate and regular rhythm. Pulmonary:      Effort: Pulmonary effort is normal.      Breath sounds: Normal breath sounds. Abdominal:      Palpations: Abdomen is soft. Musculoskeletal:      Cervical back: Normal range of motion. Comments: Dec rom left knee   Skin:     General: Skin is warm. Neurological:      Mental Status: She is alert and oriented to person, place, and time. Psychiatric:         Behavior: Behavior normal.         Yesi was seen today for pre-op exam and constipation. Diagnoses and all orders for this visit:    Rheumatoid arthritis involving multiple sites with positive rheumatoid factor (HCC)  -     CBC Auto Differential; Future  -     BASIC METABOLIC PANEL; Future    Pre-op examination  -     CBC Auto Differential; Future  -     BASIC METABOLIC PANEL; Future    Primary osteoarthritis of left knee  -     CBC Auto Differential; Future  -     BASIC METABOLIC PANEL; Future    Mild intermittent asthma without complication        Comments:   Clear surgery    Cbc bmp   Diet e xer  A great deal of time spent reviewing medications, diet, exercise, social issues.  Also reviewing the chart before entering the room with

## 2021-06-09 ENCOUNTER — TELEPHONE (OUTPATIENT)
Dept: PRIMARY CARE CLINIC | Age: 61
End: 2021-06-09

## 2021-06-09 NOTE — TELEPHONE ENCOUNTER
The pt was here to see you yesterday and she forgot to ask you about getting a script for a good stool softener sent over to [facility] on [address] so she can take it after her surgery. Also she wanted to bring to your attention that she was on her mychart just reviewing her appointment from yesterday and noticed that when her BP was put in her chart it was put in incorrect.  She says it was put in as 218/78 and her BP has never been over 120

## 2021-06-10 ENCOUNTER — TELEPHONE (OUTPATIENT)
Dept: PRIMARY CARE CLINIC | Age: 61
End: 2021-06-10

## 2021-06-10 VITALS
SYSTOLIC BLOOD PRESSURE: 128 MMHG | WEIGHT: 189 LBS | DIASTOLIC BLOOD PRESSURE: 78 MMHG | BODY MASS INDEX: 32.27 KG/M2 | TEMPERATURE: 98.7 F | HEIGHT: 64 IN

## 2021-06-10 RX ORDER — POLYETHYLENE GLYCOL 3350 17 G/17G
17 POWDER, FOR SOLUTION ORAL DAILY PRN
Qty: 1530 G | Refills: 5 | Status: SHIPPED
Start: 2021-06-10 | End: 2021-11-16

## 2021-06-10 NOTE — TELEPHONE ENCOUNTER
Tell patient I corrected her blood pressure. That was my typo error. Everything for constipation is mostly over-the-counter. I sent the MiraLAX prescription in case it is covered by her insurance.   But that would be most effective

## 2021-11-15 ENCOUNTER — OFFICE VISIT (OUTPATIENT)
Dept: PRIMARY CARE CLINIC | Age: 61
End: 2021-11-15
Payer: MEDICARE

## 2021-11-15 VITALS
HEIGHT: 64 IN | TEMPERATURE: 98.7 F | SYSTOLIC BLOOD PRESSURE: 126 MMHG | WEIGHT: 183 LBS | DIASTOLIC BLOOD PRESSURE: 75 MMHG | BODY MASS INDEX: 31.24 KG/M2

## 2021-11-15 DIAGNOSIS — M05.79 RHEUMATOID ARTHRITIS INVOLVING MULTIPLE SITES WITH POSITIVE RHEUMATOID FACTOR (HCC): Primary | ICD-10-CM

## 2021-11-15 DIAGNOSIS — M17.11 PRIMARY OSTEOARTHRITIS OF RIGHT KNEE: ICD-10-CM

## 2021-11-15 PROCEDURE — 99213 OFFICE O/P EST LOW 20 MIN: CPT | Performed by: FAMILY MEDICINE

## 2021-11-15 ASSESSMENT — PATIENT HEALTH QUESTIONNAIRE - PHQ9
1. LITTLE INTEREST OR PLEASURE IN DOING THINGS: 0
SUM OF ALL RESPONSES TO PHQ QUESTIONS 1-9: 0
SUM OF ALL RESPONSES TO PHQ QUESTIONS 1-9: 0
SUM OF ALL RESPONSES TO PHQ9 QUESTIONS 1 & 2: 0
SUM OF ALL RESPONSES TO PHQ QUESTIONS 1-9: 0
2. FEELING DOWN, DEPRESSED OR HOPELESS: 0

## 2021-11-15 NOTE — PROGRESS NOTES
11/15/21  Name: Marj Masters    : 1960    Sex: female    Age: 59 y.o. Subjective:  Chief Complaint: Patient is here for  Knee oa and  bboster       Had left knee   doen an great   Right knee  Pain and putting off surgeyr  For few months     Ask about boooster    Ask about med marijuana      Review of Systems   Constitutional: Negative. HENT: Negative. Eyes: Negative. Respiratory: Negative. Cardiovascular: Negative. Gastrointestinal: Negative. Endocrine: Negative. Genitourinary: Negative. Musculoskeletal: Positive for arthralgias. Skin: Negative. Allergic/Immunologic: Negative. Neurological: Negative. Hematological: Negative. Psychiatric/Behavioral: Negative.           Current Outpatient Medications:     polyethylene glycol (GLYCOLAX) 17 GM/SCOOP powder, Take 17 g by mouth daily as needed (Constipation), Disp: 1530 g, Rfl: 5    chlorpheniramine (ALLER-CHLOR) 4 MG tablet, Take 1 tablet by mouth every 6 hours as needed for Allergies, Disp: 120 tablet, Rfl: 2    methylPREDNISolone (MEDROL DOSEPACK) 4 MG tablet, Take by mouth., Disp: 1 kit, Rfl: 0    FLUoxetine (PROZAC) 20 MG capsule, Take 20 mg by mouth 3 times daily, Disp: , Rfl:     zolpidem (AMBIEN) 5 MG tablet, Take 2.5 mg by mouth nightly as needed for Sleep., Disp: , Rfl:     calcium carbonate (OSCAL) 500 MG TABS tablet, Take 500 mg by mouth daily, Disp: , Rfl:     Multiple Vitamins-Minerals (CENTRUM ADULTS PO), Take by mouth daily, Disp: , Rfl:     Omega-3 1000 MG CAPS, Take by mouth daily, Disp: , Rfl:     Cholecalciferol (VITAMIN D) 50 MCG (2000 UT) CAPS capsule, Take by mouth daily, Disp: , Rfl:     vitamin E 1000 units capsule, Take 1,000 Units by mouth daily, Disp: , Rfl:     Turmeric 500 MG CAPS, Take by mouth daily, Disp: , Rfl:     Ascorbic Acid (VITAMIN C) 250 MG tablet, Take 250 mg by mouth daily, Disp: , Rfl:     albuterol sulfate HFA (VENTOLIN HFA) 108 (90 Base) MCG/ACT inhaler, Inhale 2 puffs into the lungs 4 times daily as needed for Wheezing, Disp: 1 Inhaler, Rfl: 5  Allergies   Allergen Reactions    Iodides     Percocet [Oxycodone-Acetaminophen] Nausea And Vomiting and Other (See Comments)     Felt like \"bugs crawling on skin\"    Percodan [Oxycodone-Aspirin] Nausea And Vomiting     Social History     Socioeconomic History    Marital status:      Spouse name: Not on file    Number of children: Not on file    Years of education: Not on file    Highest education level: Not on file   Occupational History    Not on file   Tobacco Use    Smoking status: Former Smoker     Packs/day: 0.25     Years: 10.00     Pack years: 2.50     Quit date: 2010     Years since quittin.0    Smokeless tobacco: Never Used   Vaping Use    Vaping Use: Never used   Substance and Sexual Activity    Alcohol use: Yes     Comment: social    Drug use: No    Sexual activity: Not on file   Other Topics Concern    Not on file   Social History Narrative        DEPRESSION. RIGHT ANKLE SURGERIES TIMES THREE WITH CHRONIC SEVERE PAIN DUE TO INJURY FROM    . FATIGUE. Yesi Jennings  1960 Page #2    WEIGHT GAIN. FAMILY HISTORY OF DIABETES. IRRITABLE BOWEL SYNDROME. DEATH OF A SON 2 YEARS AGO. PROBABLY CARPAL TUNNEL SYNDROME. LEFT POSTERIOR NECK MASS.     POS RA ---DR SHEARER-----[facility]---TOLD NO RA BUT ONLY FIBROMYALGIA--    CHANGED TO DR VEGA---PT REFUSING EMBREL    LOW -    SON IN [place]    FX L 10 11 12 RIBS    CTS OR 2010---RIGHT    COLON ---SEVERE TICS SIGMOID AND PAN TIC    EGD  GASTRITIS REPEATED IN  AND IMPROVED---    SMOKER QUIT AGE 46    EGD DR CHAUDHARI 3-16 AND SETTING UP PH STUDY    20 YR OL D SON SUICIDE  IN [place]-----PT SON HAS 5 KIDS    MOM  2017---AT AGE 80    TWO SONS--ONE -----OTHER MOVED FROM [place] TO [place] AND MOVED INTO [facility]---    LEFT ANKLE REPLACEMENT 3-21-18 DR Daquan Bustillo---- RIGHT ANKLE REPLACEMENT 2-[facility]    Left shoulder OR  3-20  Dr Ken Lopez    Left total knee   dr Laila Garcia with [facility] Garcia cousin    Covid  vaccine   4-8-21   Brenda crookslv   dimer and sent to [facility]   5-21 pre op clearance       Left  TKA  dr Dania Mckeon    6-14-21     Social Determinants of Health     Financial Resource Strain:     Difficulty of Paying Living Expenses: Not on file   Food Insecurity:     Worried About Running Out of Food in the Last Year: Not on file    Claudia of Food in the Last Year: Not on file   Transportation Needs:     Lack of Transportation (Medical): Not on file    Lack of Transportation (Non-Medical):  Not on file   Physical Activity:     Days of Exercise per Week: Not on file    Minutes of Exercise per Session: Not on file   Stress:     Feeling of Stress : Not on file   Social Connections:     Frequency of Communication with Friends and Family: Not on file    Frequency of Social Gatherings with Friends and Family: Not on file    Attends Gnosticism Services: Not on file    Active Member of [address] [place] [address] [employer] or Organizations: Not on file    Attends Club or Organization Meetings: Not on file    Marital Status: Not on file   Intimate Partner Violence:     Fear of Current or Ex-Partner: Not on file    Emotionally Abused: Not on file    Physically Abused: Not on file    Sexually Abused: Not on file   Housing Stability:     Unable to Pay for Housing in the Last Year: Not on file    Number of [place] in the Last Year: Not on file    Unstable Housing in the Last Year: Not on file      Past Medical History:   Diagnosis Date    Childhood asthma     Depression with anxiety     Diverticulitis     Fatigue     Fracture, ribs     10 11 12 ribs     History of stomach ulcers     IBS (irritable bowel syndrome)     Low vitamin B12 level     PTSD (post-traumatic stress disorder)     RA (rheumatoid arthritis) ([facility]     right ankle    RA    Weight gain      Family History   Family history unknown: Yes      Past Surgical History:   Procedure Laterality Date    ACHILLES TENDON SURGERY Right 2/20/2019    RIGHT ACHILLES TENDON LENGTHENING performed by Юлия Martinez MD at [facility] 80 Bilateral    [address] Right     CARPAL TUNNEL RELEASE Right 06/2010    COLONOSCOPY  11/2012    severe TICS Sigmoid and Pan Tic     FOOT NEUROMA SURGERY      bilat    KNEE ARTHROSCOPY      left    OTHER SURGICAL HISTORY      excision neck mass    AL TOTAL ANKLE REPLACEMENT Left 3/21/2018    LEFT TOTAL ANKLE REPLACEMENT LEFT FOOT CHEILECTOMY   ([facility]) performed by Юлия Martinez MD at [facility] [place] ARTHROSCOPY Right     TONSILLECTOMY      TOTAL ANKLE ARTHROPLASTY Right 2/20/2019    RIGHT ANKLE TOTAL ARTHROPLASTY performed by Юлия Martinez MD at [address] [facility] ENDOSCOPY  4/4/2016    UPPER GASTROINTESTINAL ENDOSCOPY  11/2012    repeated 02-13 and improved       Vitals:    11/15/21 1244   BP: 126/75   Temp: 98.7 °F (37.1 °C)   TempSrc: Oral   Weight: 183 lb (83 kg)   Height: 5' 4\" (1.626 m)       Objective:    Physical Exam  Vitals reviewed. Constitutional:       Appearance: She is well-developed. HENT:      Head: Normocephalic. Eyes:      Pupils: Pupils are equal, round, and reactive to light. Cardiovascular:      Rate and Rhythm: Normal rate and regular rhythm. Pulmonary:      Effort: Pulmonary effort is normal.      Breath sounds: Normal breath sounds. Abdominal:      Palpations: Abdomen is soft. Musculoskeletal:         General: Normal range of motion. Cervical back: Normal range of motion. Skin:     General: Skin is warm. Neurological:      Mental Status: She is alert and oriented to person, place, and time. Psychiatric:         Behavior: Behavior normal.         Yesi was seen today for knee pain and arthritis.     Diagnoses and all orders for this visit:    Rheumatoid arthritis involving multiple sites with positive rheumatoid factor ([facility] - [address], Rheumatology, [place]  -     External Referral To Pain Clinic    Primary osteoarthritis of right knee        Comments: appt dr Tuan [place]    And appt     Rheum    Keon Lira isuses   A great deal of time spent reviewing medications, diet, exercise, social issues. Also reviewing the chart before entering the room with patient and finishing charting after leaving patient's room. More than half of that time was spent face to face with the patient in counseling and coordinating care.       Follow Up: Return for See Referral.     Seen by:  Minal Camacho DO

## 2021-11-16 ENCOUNTER — OFFICE VISIT (OUTPATIENT)
Dept: RHEUMATOLOGY | Age: 61
End: 2021-11-16
Payer: MEDICARE

## 2021-11-16 VITALS
SYSTOLIC BLOOD PRESSURE: 124 MMHG | DIASTOLIC BLOOD PRESSURE: 76 MMHG | WEIGHT: 180 LBS | BODY MASS INDEX: 30.73 KG/M2 | HEIGHT: 64 IN | OXYGEN SATURATION: 100 % | HEART RATE: 73 BPM

## 2021-11-16 DIAGNOSIS — Z00.00 HEALTHCARE MAINTENANCE: ICD-10-CM

## 2021-11-16 DIAGNOSIS — Z11.59 ENCOUNTER FOR SCREENING FOR OTHER VIRAL DISEASES: ICD-10-CM

## 2021-11-16 DIAGNOSIS — M85.872 OTHER SPECIFIED DISORDERS OF BONE DENSITY AND STRUCTURE, LEFT ANKLE AND FOOT: ICD-10-CM

## 2021-11-16 DIAGNOSIS — M05.79 RHEUMATOID ARTHRITIS INVOLVING MULTIPLE SITES WITH POSITIVE RHEUMATOID FACTOR (HCC): Chronic | ICD-10-CM

## 2021-11-16 DIAGNOSIS — Z51.81 MEDICATION MONITORING ENCOUNTER: ICD-10-CM

## 2021-11-16 DIAGNOSIS — M15.9 GENERALIZED OSTEOARTHRITIS: ICD-10-CM

## 2021-11-16 DIAGNOSIS — M05.79 RHEUMATOID ARTHRITIS INVOLVING MULTIPLE SITES WITH POSITIVE RHEUMATOID FACTOR (HCC): Primary | Chronic | ICD-10-CM

## 2021-11-16 LAB
ALBUMIN SERPL-MCNC: 4.8 G/DL (ref 3.5–5.2)
ALP BLD-CCNC: 127 U/L (ref 35–104)
ALT SERPL-CCNC: 18 U/L (ref 0–32)
ANION GAP SERPL CALCULATED.3IONS-SCNC: 16 MMOL/L (ref 7–16)
AST SERPL-CCNC: 23 U/L (ref 0–31)
BASOPHILS ABSOLUTE: 0.04 E9/L (ref 0–0.2)
BASOPHILS RELATIVE PERCENT: 0.7 % (ref 0–2)
BILIRUB SERPL-MCNC: 0.3 MG/DL (ref 0–1.2)
BUN BLDV-MCNC: 10 MG/DL (ref 6–23)
C-REACTIVE PROTEIN: 1.1 MG/DL (ref 0–0.4)
CALCIUM SERPL-MCNC: 9.6 MG/DL (ref 8.6–10.2)
CHLORIDE BLD-SCNC: 102 MMOL/L (ref 98–107)
CO2: 25 MMOL/L (ref 22–29)
CREAT SERPL-MCNC: 0.7 MG/DL (ref 0.5–1)
EOSINOPHILS ABSOLUTE: 0.27 E9/L (ref 0.05–0.5)
EOSINOPHILS RELATIVE PERCENT: 4.4 % (ref 0–6)
GFR AFRICAN AMERICAN: >60
GFR NON-AFRICAN AMERICAN: >60 ML/MIN/1.73
GLUCOSE BLD-MCNC: 94 MG/DL (ref 74–99)
HCT VFR BLD CALC: 42.4 % (ref 34–48)
HEMOGLOBIN: 13.1 G/DL (ref 11.5–15.5)
IMMATURE GRANULOCYTES #: 0.02 E9/L
IMMATURE GRANULOCYTES %: 0.3 % (ref 0–5)
LYMPHOCYTES ABSOLUTE: 1.52 E9/L (ref 1.5–4)
LYMPHOCYTES RELATIVE PERCENT: 24.9 % (ref 20–42)
MCH RBC QN AUTO: 28.1 PG (ref 26–35)
MCHC RBC AUTO-ENTMCNC: 30.9 % (ref 32–34.5)
MCV RBC AUTO: 91 FL (ref 80–99.9)
MONOCYTES ABSOLUTE: 0.47 E9/L (ref 0.1–0.95)
MONOCYTES RELATIVE PERCENT: 7.7 % (ref 2–12)
NEUTROPHILS ABSOLUTE: 3.78 E9/L (ref 1.8–7.3)
NEUTROPHILS RELATIVE PERCENT: 62 % (ref 43–80)
PDW BLD-RTO: 14.6 FL (ref 11.5–15)
PLATELET # BLD: 211 E9/L (ref 130–450)
PMV BLD AUTO: 12.6 FL (ref 7–12)
POTASSIUM SERPL-SCNC: 4.4 MMOL/L (ref 3.5–5)
RBC # BLD: 4.66 E12/L (ref 3.5–5.5)
RHEUMATOID FACTOR: 103 IU/ML (ref 0–13)
SEDIMENTATION RATE, ERYTHROCYTE: 42 MM/HR (ref 0–20)
SODIUM BLD-SCNC: 143 MMOL/L (ref 132–146)
TOTAL PROTEIN: 8.1 G/DL (ref 6.4–8.3)
WBC # BLD: 6.1 E9/L (ref 4.5–11.5)

## 2021-11-16 PROCEDURE — 99205 OFFICE O/P NEW HI 60 MIN: CPT | Performed by: INTERNAL MEDICINE

## 2021-11-16 RX ORDER — NAPROXEN 500 MG/1
500 TABLET ORAL 2 TIMES DAILY WITH MEALS
Qty: 60 TABLET | Refills: 5 | Status: SHIPPED
Start: 2021-11-16 | End: 2022-11-02

## 2021-11-16 ASSESSMENT — ENCOUNTER SYMPTOMS
COUGH: 0
SHORTNESS OF BREATH: 0
COLOR CHANGE: 0
NAUSEA: 0
DIARRHEA: 0
ABDOMINAL PAIN: 0
TROUBLE SWALLOWING: 0
VOMITING: 0

## 2021-11-16 NOTE — PROGRESS NOTES
Maribell Wilder 1960 is a 59 y.o. female, here for evaluation of the following chief complaint(s):  New Patient (rheumatoid arthritis w/ positive rheumatoid factor )         ASSESSMENT/PLAN:    Maribell Wilder 1960 is a 59 y.o. female seen in consult for rheumatoid arthritis. 1.  RF positive rheumatoid arthritis-this is a historical diagnosis. She has failed Plaquenil and methotrexate in the past.  She does have high positive rheumatoid factor and significant family history with certainly raises suspicion for rheumatoid arthritis. However, I see no obvious active synovitis on exam today. Obviously I cannot speak to any previous synovitis in joints that she has had replaced but I am and not in any hurry to place her on immunosuppressive's today particularly since she will be having her right knee replaced and has some dental work that needs to be done. At this point I would like to get further work-up as below to get a better characterization of her disease and reevaluate in 3 months after she has had time to get her dental work and knee taken care of. If at that point it does look like we are indeed dealing with rheumatoid arthritis can consider biologic therapy. If I see anything overly concerning on work-up below we may have her back sooner to discuss treatment options. 2.  Osteoarthritis-either way she has underlying osteoarthritis. I have given her a prescription for naproxen 500 mg twice daily as needed with food. 3.  Medication monitoring-we will check TB and hepatitis in anticipation of potential immunosuppressive treatment. 4.  Rheumatoid factor positive-above with her family history certainly does raise some suspicion for rheumatoid arthritis but again on her exam there is no striking signs of active rheumatoid arthritis so we will need to check for other potential causes of positive rheumatoid factor such as hepatitis C and Sjogren's syndrome.     5.  Healthcare maintenance-we will update bone density scan. 1. Rheumatoid arthritis involving multiple sites with positive rheumatoid factor (HCC)  -     Rheumatoid Factor; Future  -     Sedimentation Rate; Future  -     Comprehensive Metabolic Panel; Future  -     CBC Auto Differential; Future  -     C-Reactive Protein; Future  -     Cyclic Citrul Peptide Antibody, IgG; Future  -     Hepatitis B Core Antibody, Total; Future  -     Hepatitis B Surface Antibody; Future  -     Hepatitis B Surface Antigen; Future  -     Hepatitis C Antibody; Future  -     Sjogren's Ab (SS-A, SS-B); Future  -     XR HAND LEFT (MIN 3 VIEWS); Future  -     XR HAND RIGHT (MIN 3 VIEWS); Future  -     XR FOOT LEFT (MIN 3 VIEWS); Future  -     XR FOOT RIGHT (MIN 3 VIEWS); Future  -     T-Spot TB Test; Future  -     DEXA BONE DENSITY 2 SITES; Future  2. Generalized osteoarthritis  -     Rheumatoid Factor; Future  -     Sedimentation Rate; Future  -     Comprehensive Metabolic Panel; Future  -     CBC Auto Differential; Future  -     C-Reactive Protein; Future  -     Cyclic Citrul Peptide Antibody, IgG; Future  -     Hepatitis B Core Antibody, Total; Future  -     Hepatitis B Surface Antibody; Future  -     Hepatitis B Surface Antigen; Future  -     Hepatitis C Antibody; Future  -     Sjogren's Ab (SS-A, SS-B); Future  -     T-Spot TB Test; Future  3. Encounter for screening for other viral diseases   -     Hepatitis B Core Antibody, Total; Future  -     Hepatitis B Surface Antibody; Future  -     Hepatitis B Surface Antigen; Future  4. Other specified disorders of bone density and structure, left ankle and foot   -     DEXA BONE DENSITY 2 SITES; Future  5. Healthcare maintenance  6. Medication monitoring encounter      Return in about 3 months (around 2/16/2022). Subjective   SUBJECTIVE/OBJECTIVE:    HPI: Vale Martin 1960 is a 59 y.o. female seen in consult for historical diagnosis of rheumatoid arthritis.   Patient had apparently seen 3 different rheumatologist in the past.  To thought she had rheumatoid arthritis, one did not. She was diagnosed about 10 years ago. She states at the time she was achy all over. 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 She has had multiple joint replacements including the left knee, left shoulder, both ankles. She has had her right first MCP joint fused in her right second DIP fused. She has had arthroscopic surgery on the right shoulder which is now giving her a little more trouble. She will ultimately need her right knee replaced as well. She has not seen a rheumatologist in 5 years actually. She was previously on Plaquenil and methotrexate but those did not help much. She currently takes Aleve which helps to some degree. She has a strong family history of rheumatoid arthritis and does have a positive rheumatoid factor of 94. She has a negative SUSANA. Aside from the right shoulder and right knee her hands and wrists bother her but she also just feels generally achy. She does not really get a whole lot of joint swelling. She has no extra-articular manifestations. Past Medical History:   Diagnosis Date    Childhood asthma     Depression with anxiety     Diverticulitis     Fatigue     Fracture, ribs     10 11 12 ribs     History of stomach ulcers     IBS (irritable bowel syndrome)     Low vitamin B12 level     PTSD (post-traumatic stress disorder)     [facility] ([facility])     right ankle    RA    Weight gain         Review of Systems   Constitutional: Negative for fatigue and fever. HENT: Negative for mouth sores and trouble swallowing. Respiratory: Negative for cough and shortness of breath. Cardiovascular: Negative for chest pain. Gastrointestinal: Negative for abdominal pain, diarrhea, nausea and vomiting. Genitourinary: Negative for dysuria and hematuria. Musculoskeletal: Positive for arthralgias. Negative for joint swelling. Skin: Negative for color change and rash. Neurological: Negative for weakness and numbness. Hematological: Negative for adenopathy. All other systems reviewed and are negative. Objective   Vitals:    11/16/21 0848   BP: 124/76   Pulse: 73   SpO2: 100%      Physical Exam  Constitutional:       General: She is not in acute distress. Appearance: Normal appearance. HENT:      Head: Normocephalic and atraumatic. Right Ear: External ear normal.      Left Ear: External ear normal.      Nose: Nose normal.   Eyes:      General: No scleral icterus. Pulmonary:      Effort: Pulmonary effort is normal.   Musculoskeletal:         General: Deformity present. No swelling or tenderness. Comments: She has Heberden's nodes and squaring of the first CMC joints. There may be some very subtle puffiness to the right third PIP joint but otherwise no obvious synovitis on exam.  She does have crepitus in the right wrist and the right knee. Skin:     General: Skin is warm and dry. Findings: No rash. Neurological:      General: No focal deficit present. Mental Status: She is alert and oriented to person, place, and time. Mental status is at baseline.    Psychiatric:         Mood and Affect: Mood normal.         Behavior: Behavior normal.            Lab Results   Component Value Date    WBC 5.5 06/08/2021    HGB 12.3 06/08/2021    HCT 39.2 06/08/2021    MCV 91.0 06/08/2021     06/08/2021     Lab Results   Component Value Date     06/08/2021    K 4.0 06/08/2021     06/08/2021    CO2 25 06/08/2021    BUN 12 06/08/2021    CREATININE 0.6 06/08/2021    GLUCOSE 98 06/08/2021    CALCIUM 9.3 06/08/2021    PROT 7.3 04/19/2021    LABALBU 4.4 04/19/2021    BILITOT 0.3 04/19/2021    ALKPHOS 110 (H) 04/19/2021    AST 22 04/19/2021    ALT 26 04/19/2021    LABGLOM >60 06/08/2021    GFRAA >60 06/08/2021     Lab Results   Component Value Date    SUSANA NEGATIVE 04/19/2021     No results found for: RHEUMFACTOR  Lab Results   Component Value Date    SEDRATE 22 (H) 04/19/2021     Lab Results   Component Value Date    CRP 0.6 12/12/2012            An electronic signature was used to authenticate this note. This note was generated with a voice recognition dictation system. Please disregard any errors or omission which have escaped my review.     --Himanshu Ash, DO Date Of Previous Biopsy (Optional): 9/25/19

## 2021-11-17 LAB
HBV SURFACE AB TITR SER: NORMAL {TITER}
HEPATITIS B SURFACE ANTIGEN INTERPRETATION: NORMAL
HEPATITIS C ANTIBODY INTERPRETATION: NORMAL

## 2021-11-18 LAB
ENA TO SSA (RO) ANTIBODY: NEGATIVE
ENA TO SSB (LA) ANTIBODY: NEGATIVE
HEPATITIS B CORE TOTAL ANTIBODY: NONREACTIVE

## 2021-11-19 LAB
COMMENT: NORMAL
CYCLIC CITRULLINATED PEPTIDE ANTIBODY IGG: >340 U/ML (ref 0–7)
REPORT: NORMAL

## 2021-11-29 ENCOUNTER — OFFICE VISIT (OUTPATIENT)
Dept: PRIMARY CARE CLINIC | Age: 61
End: 2021-11-29
Payer: MEDICARE

## 2021-11-29 VITALS
DIASTOLIC BLOOD PRESSURE: 78 MMHG | SYSTOLIC BLOOD PRESSURE: 134 MMHG | WEIGHT: 183 LBS | TEMPERATURE: 97.1 F | HEART RATE: 77 BPM | BODY MASS INDEX: 31.41 KG/M2

## 2021-11-29 DIAGNOSIS — H10.89 OTHER CONJUNCTIVITIS OF LEFT EYE: Primary | ICD-10-CM

## 2021-11-29 DIAGNOSIS — M05.79 RHEUMATOID ARTHRITIS INVOLVING MULTIPLE SITES WITH POSITIVE RHEUMATOID FACTOR (HCC): Chronic | ICD-10-CM

## 2021-11-29 PROCEDURE — 99213 OFFICE O/P EST LOW 20 MIN: CPT | Performed by: FAMILY MEDICINE

## 2021-11-29 RX ORDER — CEPHALEXIN 500 MG/1
500 CAPSULE ORAL 3 TIMES DAILY
Qty: 21 CAPSULE | Refills: 0 | Status: SHIPPED | OUTPATIENT
Start: 2021-11-29 | End: 2021-12-06

## 2021-11-29 ASSESSMENT — ENCOUNTER SYMPTOMS
GASTROINTESTINAL NEGATIVE: 1
ALLERGIC/IMMUNOLOGIC NEGATIVE: 1
RESPIRATORY NEGATIVE: 1

## 2021-11-29 NOTE — PROGRESS NOTES
21  Name: Navid Huerta    : 1960    Sex: female    Age: 59 y.o. Subjective:  Chief Complaint: Patient is here for left eye irritation     Two days with no tmep chils  Vision ok     Sl matter in eye this am    Saw rheum and  Dx   RA      Review of Systems   Constitutional: Negative. HENT: Negative. Eyes:        See   hpi   Respiratory: Negative. Cardiovascular: Negative. Gastrointestinal: Negative. Endocrine: Negative. Genitourinary: Negative. Musculoskeletal: Negative. Skin: Negative. Allergic/Immunologic: Negative. Neurological: Negative. Hematological: Negative. Psychiatric/Behavioral: Negative.           Current Outpatient Medications:     cephALEXin (KEFLEX) 500 MG capsule, Take 1 capsule by mouth 3 times daily for 7 days, Disp: 21 capsule, Rfl: 0    neomycin-polymyxin-dexamethasone (MAXITROL) 0.1 % ophthalmic suspension, Place 1 drop into the left eye 4 times daily, Disp: 5 mL, Rfl: 0    naproxen (NAPROSYN) 500 MG tablet, Take 1 tablet by mouth 2 times daily (with meals), Disp: 60 tablet, Rfl: 5    FLUoxetine (PROZAC) 20 MG capsule, Take 20 mg by mouth 3 times daily, Disp: , Rfl:     zolpidem (AMBIEN) 5 MG tablet, Take 2.5 mg by mouth nightly as needed for Sleep., Disp: , Rfl:     calcium carbonate (OSCAL) 500 MG TABS tablet, Take 500 mg by mouth daily, Disp: , Rfl:     Multiple Vitamins-Minerals (CENTRUM ADULTS PO), Take by mouth daily, Disp: , Rfl:     Omega-3 1000 MG CAPS, Take by mouth daily, Disp: , Rfl:     Cholecalciferol (VITAMIN D) 50 MCG (2000 UT) CAPS capsule, Take by mouth daily, Disp: , Rfl:     vitamin E 1000 units capsule, Take 1,000 Units by mouth daily, Disp: , Rfl:     Turmeric 500 MG CAPS, Take by mouth daily, Disp: , Rfl:     Ascorbic Acid (VITAMIN C) 250 MG tablet, Take 250 mg by mouth daily, Disp: , Rfl:   Allergies   Allergen Reactions    Iodides     Percocet [Oxycodone-Acetaminophen] Nausea And Vomiting and Other (See Comments)     Felt like \"bugs crawling on skin\"    Percodan [Oxycodone-Aspirin] Nausea And Vomiting     Social History     Socioeconomic History    Marital status:      Spouse name: Not on file    Number of children: Not on file    Years of education: Not on file    Highest education level: Not on file   Occupational History    Not on file   Tobacco Use    Smoking status: Former Smoker     Packs/day: 0.25     Years: 10.00     Pack years: 2.50     Quit date: 2010     Years since quittin.0    Smokeless tobacco: Never Used   Vaping Use    Vaping Use: Never used   Substance and Sexual Activity    Alcohol use: Yes     Comment: social    Drug use: No    Sexual activity: Not on file   Other Topics Concern    Not on file   Social History Narrative        DEPRESSION. RIGHT ANKLE SURGERIES TIMES THREE WITH CHRONIC SEVERE PAIN DUE TO INJURY FROM    . FATIGUE. Yesi EZE Dick  1960 Page #2    WEIGHT GAIN. FAMILY HISTORY OF DIABETES. IRRITABLE BOWEL SYNDROME. DEATH OF A SON 2 YEARS AGO. PROBABLY CARPAL TUNNEL SYNDROME. LEFT POSTERIOR NECK MASS.     POS RA ---DR SHEARER-----[facility]---TOLD NO RA BUT ONLY FIBROMYALGIA--    CHANGED TO DR VEGA---PT REFUSING EMBREL    LOW B-12     SON IN [place]    FX L 10 11 12 RIBS    CTS OR 2010---RIGHT    COLON ---SEVERE TICS SIGMOID AND PAN TIC    EGD  GASTRITIS REPEATED IN  AND IMPROVED---    SMOKER QUIT AGE 46    EGD DR CHAUDHARI 3-16 AND SETTING UP PH STUDY    20 YR OL D SON SUICIDE  IN [place]-----PT SON HAS 5 KIDS    MOM  2017---AT AGE 80    TWO SONS--ONE -----OTHER MOVED FROM [place] TO [place] AND MOVED INTO [facility]---    LEFT ANKLE REPLACEMENT 3-21- DR Ernestina Benz----    RIGHT ANKLE REPLACEMENT  DR Ernestina Benz    Left shoulder OR  3-20  Dr Ochoa Swann    Left total knee   dr Preston Henry with Smita Steven cousin    Covid  vaccine   21   Odinon   [facility]   dimer and sent to er Eval  dr li   5-21 pre op clearance       Left  TKA  dr Yeny Jordan    6-14-21     Social Determinants of Health     Financial Resource Strain:     Difficulty of Paying Living Expenses: Not on file   Food Insecurity:     Worried About Running Out of Food in the Last Year: Not on file    Claudia of Food in the Last Year: Not on file   Transportation Needs:     Lack of Transportation (Medical): Not on file    Lack of Transportation (Non-Medical):  Not on file   Physical Activity:     Days of Exercise per Week: Not on file    Minutes of Exercise per Session: Not on file   Stress:     Feeling of Stress : Not on file   Social Connections:     Frequency of Communication with Friends and Family: Not on file    Frequency of Social Gatherings with Friends and Family: Not on file    Attends Oriental orthodox Services: Not on file    Active Member of [address] [place] [address] [employer] or Organizations: Not on file    Attends Club or Organization Meetings: Not on file    Marital Status: Not on file   Intimate Partner Violence:     Fear of Current or Ex-Partner: Not on file    Emotionally Abused: Not on file    Physically Abused: Not on file    Sexually Abused: Not on file   Housing Stability:     Unable to Pay for Housing in the Last Year: Not on file    Number of [place] in the Last Year: Not on file    Unstable Housing in the Last Year: Not on file      Past Medical History:   Diagnosis Date    Childhood asthma     Depression with anxiety     Diverticulitis     Fatigue     Fracture, ribs     10 11 12 ribs     History of stomach ulcers     IBS (irritable bowel syndrome)     Low vitamin B12 level     PTSD (post-traumatic stress disorder)     RA (rheumatoid arthritis) ([facility] [address]     right ankle    RA    Weight gain      Family History   Family history unknown: Yes      Past Surgical History:   Procedure Laterality Date    ACHILLES TENDON SURGERY Right 2/20/2019    RIGHT ACHILLES TENDON LENGTHENING performed by Lu Garibay MD at [address] Bilateral     CARPAL TUNNEL RELEASE Right     CARPAL TUNNEL RELEASE Right 06/2010    COLONOSCOPY  11/2012    severe TICS Sigmoid and Pan Tic     FOOT NEUROMA SURGERY      bilat    KNEE ARTHROSCOPY      left    OTHER SURGICAL HISTORY      excision neck mass    AK TOTAL ANKLE REPLACEMENT Left 3/21/2018    LEFT TOTAL ANKLE REPLACEMENT LEFT FOOT CHEILECTOMY   ([facility]) performed by Jamil Brumfield MD at [address] [facility] ARTHROSCOPY Right     TONSILLECTOMY      TOTAL ANKLE ARTHROPLASTY Right 2/20/2019    RIGHT ANKLE TOTAL ARTHROPLASTY performed by Jamil Brumfield MD at [address] [facility] ENDOSCOPY  4/4/2016    UPPER GASTROINTESTINAL ENDOSCOPY  11/2012    repeated 02-13 and improved       Vitals:    11/29/21 1506   BP: 134/78   Pulse: 77   Temp: 97.1 °F (36.2 °C)   TempSrc: Temporal   Weight: 183 lb (83 kg)       Objective:    Physical Exam  Vitals reviewed. Constitutional:       Appearance: She is well-developed. HENT:      Head: Normocephalic. Eyes:      Pupils: Pupils are equal, round, and reactive to light. Comments: Left lower conj   And lid with sl mago    Cardiovascular:      Rate and Rhythm: Normal rate and regular rhythm. Pulmonary:      Effort: Pulmonary effort is normal.      Breath sounds: Normal breath sounds. Abdominal:      Palpations: Abdomen is soft. Musculoskeletal:         General: Normal range of motion. Cervical back: Normal range of motion. Skin:     General: Skin is warm. Neurological:      Mental Status: She is alert and oriented to person, place, and time. Psychiatric:         Behavior: Behavior normal.         Yesi was seen today for eye pain. Diagnoses and all orders for this visit:    Other conjunctivitis of left eye  -     cephALEXin (KEFLEX) 500 MG capsule;  Take 1 capsule by mouth 3 times daily for 7 days  -     neomycin-polymyxin-dexamethasone (MAXITROL) 0.1 % ophthalmic suspension; Place 1 drop into the left eye 4 times daily    Rheumatoid arthritis involving multiple sites with positive rheumatoid factor (HCC)        Comments: meds not great in 24 h  Nikita  w orse toe r    A great deal of time spent reviewing medications, diet, exercise, social issues. Also reviewing the chart before entering the room with patient and finishing charting after leaving patient's room. More than half of that time was spent face to face with the patient in counseling and coordinating care. Follow Up: Return if symptoms worsen or fail to improve.      Seen by:  Sarwat Louie, DO

## 2021-12-02 ENCOUNTER — HOSPITAL ENCOUNTER (OUTPATIENT)
Dept: MAMMOGRAPHY | Age: 61
Discharge: HOME | End: 2021-12-04
Payer: MEDICARE

## 2021-12-02 DIAGNOSIS — M05.79 RHEUMATOID ARTHRITIS INVOLVING MULTIPLE SITES WITH POSITIVE RHEUMATOID FACTOR (HCC): Chronic | ICD-10-CM

## 2021-12-02 DIAGNOSIS — M85.872 OTHER SPECIFIED DISORDERS OF BONE DENSITY AND STRUCTURE, LEFT ANKLE AND FOOT: ICD-10-CM

## 2021-12-02 PROCEDURE — 77080 DXA BONE DENSITY AXIAL: CPT

## 2021-12-08 ENCOUNTER — OFFICE VISIT (OUTPATIENT)
Dept: PRIMARY CARE CLINIC | Age: 61
End: 2021-12-08
Payer: MEDICARE

## 2021-12-08 VITALS
HEIGHT: 64 IN | BODY MASS INDEX: 31.58 KG/M2 | WEIGHT: 185 LBS | SYSTOLIC BLOOD PRESSURE: 128 MMHG | DIASTOLIC BLOOD PRESSURE: 78 MMHG | TEMPERATURE: 97.9 F

## 2021-12-08 DIAGNOSIS — Z20.822 EXPOSURE TO CONFIRMED CASE OF COVID-19: ICD-10-CM

## 2021-12-08 DIAGNOSIS — Z20.822 EXPOSURE TO CONFIRMED CASE OF COVID-19: Primary | ICD-10-CM

## 2021-12-08 DIAGNOSIS — H10.89 OTHER CONJUNCTIVITIS OF LEFT EYE: ICD-10-CM

## 2021-12-08 LAB — SARS-COV-2 ANTIBODY, TOTAL: NORMAL

## 2021-12-08 PROCEDURE — 99213 OFFICE O/P EST LOW 20 MIN: CPT | Performed by: FAMILY MEDICINE

## 2021-12-08 ASSESSMENT — ENCOUNTER SYMPTOMS: RESPIRATORY NEGATIVE: 1

## 2021-12-08 NOTE — PROGRESS NOTES
date: 2010     Years since quittin.0    Smokeless tobacco: Never Used   Vaping Use    Vaping Use: Never used   Substance and Sexual Activity    Alcohol use: Yes     Comment: social    Drug use: No    Sexual activity: Not on file   Other Topics Concern    Not on file   Social History Narrative        DEPRESSION. RIGHT ANKLE SURGERIES TIMES THREE WITH CHRONIC SEVERE PAIN DUE TO INJURY FROM    . FATIGUE. Yesi Jennings  1960 Page #2    WEIGHT GAIN. FAMILY HISTORY OF DIABETES. IRRITABLE BOWEL SYNDROME. DEATH OF A SON 2 YEARS AGO. PROBABLY CARPAL TUNNEL SYNDROME. LEFT POSTERIOR NECK MASS. POS RA ---DR SHEARER-----[facility]---TOLD NO RA BUT ONLY FIBROMYALGIA--    CHANGED TO DR VEGA---PT REFUSING EMBREL    LOW -12     SON IN [place]    FX L 10 11 12 RIBS    CTS OR 2010---RIGHT    COLON ---SEVERE TICS SIGMOID AND PAN TIC    EGD  GASTRITIS REPEATED IN  AND IMPROVED---    SMOKER QUIT AGE 46    EGD DR CHAUDHARI 3-16 AND SETTING UP PH STUDY    20 YR OL D SON SUICIDE  IN [place]-----PT SON HAS 5 KIDS    MOM  2017---AT AGE 80    TWO SONS--ONE -----OTHER MOVED FROM [place] TO [place] AND MOVED INTO [facility]---    LEFT ANKLE REPLACEMENT 3-21-18 DR Patricia Benton----    RIGHT ANKLE REPLACEMENT  DR Patricia Benton    Left shoulder OR  3-20  Dr Ishaan De Dios    Left total knee   dr Cindy Olmos with Giuliana Garcia cousin    Covid  vaccine   21   Odinoson   eelv   dimer and sent to er Zenda Koyanagi dr hunt    pre op clearance       Left  TKA  dr Ramonita Little    21     Social Determinants of Health     Financial Resource Strain:     Difficulty of Paying Living Expenses: Not on file   Food Insecurity:     Worried About Running Out of Food in the Last Year: Not on file    Claudia of Food in the Last Year: Not on file   Transportation Needs:     Lack of Transportation (Medical): Not on file    Lack of Transportation (Non-Medical):  Not on file   Physical Activity:     Days of Exercise per Week: Not on file    Minutes of Exercise per Session: Not on file   Stress:     Feeling of Stress : Not on file   Social Connections:     Frequency of Communication with Friends and Family: Not on file    Frequency of Social Gatherings with Friends and Family: Not on file    Attends Religion Services: Not on file    Active Member of Clubs or Organizations: Not on file    Attends Club or Organization Meetings: Not on file    Marital Status: Not on file   Intimate Partner Violence:     Fear of Current or Ex-Partner: Not on file    Emotionally Abused: Not on file    Physically Abused: Not on file    Sexually Abused: Not on file   Housing Stability:     Unable to Pay for Housing in the Last Year: Not on file    Number of [place] in the Last Year: Not on file    Unstable Housing in the Last Year: Not on file      Past Medical History:   Diagnosis Date    Childhood asthma     Depression with anxiety     Diverticulitis     Fatigue     Fracture, ribs     10 11 12 ribs     History of stomach ulcers     IBS (irritable bowel syndrome)     Low vitamin B12 level     PTSD (post-traumatic stress disorder)     RA (rheumatoid arthritis) ([facility] [address]     right ankle    RA    Weight gain      Family History   Family history unknown: Yes      Past Surgical History:   Procedure Laterality Date    ACHILLES TENDON SURGERY Right 2/20/2019    RIGHT ACHILLES TENDON LENGTHENING performed by Zunilda Taylor MD at [address] Bilateral    Beatrice Clap Right     Beatrice Clap Right 06/2010    COLONOSCOPY  11/2012    severe TICS Sigmoid and Pan Tic     FOOT NEUROMA SURGERY      bilat    KNEE ARTHROSCOPY      left    OTHER SURGICAL HISTORY      excision neck mass    MS TOTAL ANKLE REPLACEMENT Left 3/21/2018    LEFT TOTAL ANKLE REPLACEMENT LEFT FOOT CHEILECTOMY   ([facility]) performed [facility] Zunilda [facility] MD at [facility] ARTHROSCOPY Right     TONSILLECTOMY      TOTAL ANKLE ARTHROPLASTY Right 2/20/2019    RIGHT ANKLE TOTAL ARTHROPLASTY performed by Caity Lawton MD at [address] [place] [address] ENDOSCOPY  4/4/2016    UPPER GASTROINTESTINAL ENDOSCOPY  11/2012    repeated 02-13 and improved       Vitals:    12/08/21 1607   BP: 128/78   Temp: 97.9 °F (36.6 °C)   TempSrc: Temporal   Weight: 185 lb (83.9 kg)   Height: 5' 4\" (1.626 m)       Objective:    Physical Exam  HENT:      Right Ear: Tympanic membrane normal.      Left Ear: Tympanic membrane normal.   Neck:      Comments: Left med lower lid with tiny stye  Cardiovascular:      Rate and Rhythm: Normal rate and regular rhythm. Yesi was seen today for other. Diagnoses and all orders for this visit:    Exposure to confirmed case of COVID-19  -     Covid-19, Antibody; Future  -     COVID-19 Ambulatory; Future    Other conjunctivitis of left eye  -     neomycin-polymyxin-dexamethasone (MAXITROL) 0.1 % ophthalmic suspension; Place 1 drop into the left eye 4 times daily        Comments:   Cont  Drops  mosit heat    covid  Ab per her request      A great deal of time spent reviewing medications, diet, exercise, social issues. Also reviewing the chart before entering the room with patient and finishing charting after leaving patient's room. More than half of that time was spent face to face with the patient in counseling and coordinating care. To see eye doc  She says will amarilis     itnot great in two d call me    Follow Up: Return for Reg Appt.      Seen by:  Gutierrez Sanabria DO

## 2021-12-09 ENCOUNTER — TELEPHONE (OUTPATIENT)
Dept: PRIMARY CARE CLINIC | Age: 61
End: 2021-12-09

## 2021-12-10 LAB
SARS-COV-2: NOT DETECTED
SOURCE: NORMAL

## 2021-12-11 ENCOUNTER — TELEPHONE (OUTPATIENT)
Dept: PRIMARY CARE CLINIC | Age: 61
End: 2021-12-11

## 2022-01-13 ENCOUNTER — OFFICE VISIT (OUTPATIENT)
Dept: RHEUMATOLOGY | Age: 62
End: 2022-01-13
Payer: MEDICARE

## 2022-01-13 VITALS
WEIGHT: 185 LBS | SYSTOLIC BLOOD PRESSURE: 130 MMHG | HEART RATE: 69 BPM | OXYGEN SATURATION: 99 % | HEIGHT: 64 IN | DIASTOLIC BLOOD PRESSURE: 80 MMHG | BODY MASS INDEX: 31.58 KG/M2

## 2022-01-13 DIAGNOSIS — M05.79 RHEUMATOID ARTHRITIS INVOLVING MULTIPLE SITES WITH POSITIVE RHEUMATOID FACTOR (HCC): Primary | Chronic | ICD-10-CM

## 2022-01-13 DIAGNOSIS — M15.9 GENERALIZED OSTEOARTHRITIS: ICD-10-CM

## 2022-01-13 DIAGNOSIS — Z51.81 MEDICATION MONITORING ENCOUNTER: ICD-10-CM

## 2022-01-13 DIAGNOSIS — M85.89 OSTEOPENIA OF MULTIPLE SITES: ICD-10-CM

## 2022-01-13 PROCEDURE — 99214 OFFICE O/P EST MOD 30 MIN: CPT | Performed by: INTERNAL MEDICINE

## 2022-01-13 ASSESSMENT — ENCOUNTER SYMPTOMS
VOMITING: 0
ABDOMINAL PAIN: 0
DIARRHEA: 0
COUGH: 0
TROUBLE SWALLOWING: 0
NAUSEA: 0
COLOR CHANGE: 0
SHORTNESS OF BREATH: 0

## 2022-01-13 NOTE — PATIENT INSTRUCTIONS
Call if you would like to diclofenac in place of naproxen    Call if you have a flare for a short course of steroids    Will Consider Humira at next visit      Patient Education        adalimumab  Pronunciation:  [address] [facility]  Brand:  Humira  What is the most important information I should know about adalimumab? This medicine affects your immune system. You may get infections more easily, even serious or fatal infections. Before or during treatment with adalimumab, tell your doctor if you have signs of infection such as fever, chills, aches, tiredness, cough, skin sores, diarrhea, or burning when you urinate. What is adalimumab? Adalimumab reduces the effects of a substance in the body that can cause inflammation. Adalimumab is used to treat many inflammatory conditions in adults, such as ulcerative colitis, rheumatoid arthritis, psoriatic arthritis, ankylosing spondylitis, plaque psoriasis, and a skin condition called hidradenitis suppurativa. Adalimumab is also used in adults and children to treat Crohn's disease, juvenile idiopathic arthritis, or uveitis. Adalimumab may also be used for purposes not listed in this medication guide. What should I discuss with my healthcare provider before using adalimumab? You should not use this medicine if you are allergic to adalimumab. Before you start using adalimumab, tell your doctor if you have signs of infection --fever, chills, sweats, muscle aches, tiredness, cough, bloody mucus, skin sores, diarrhea, burning when you urinate, or feeling constantly tired. Adalimumab should not be given to a child younger than 3years old (or 10years old if treating Crohn's disease). Children using adalimumab should be current on all childhood immunizations before starting treatment.   Tell your doctor if you have ever had:  · tuberculosis (or if anyone in your household has tuberculosis);  · a chronic infection;  · cancer;  · hepatitis B (adalimumab can cause hepatitis B to come back or get worse);  · diabetes;  · heart failure;  · any numbness or tingling, or a nerve-muscle disorder such as multiple sclerosis or Guillain-[place] syndrome;  · an allergy to latex rubber;  · if you are scheduled to have major surgery; or  · if you have recently received or are scheduled to receive any vaccine. Tell your doctor where you live and if you have recently traveled or plan to travel. You may be exposed to infections that are common to certain areas of the world. Adalimumab may cause a rare type of lymphoma (cancer) of the liver, spleen, and bone marrow that can be fatal. This has occurred mainly in teenagers and young men with Crohn's disease or ulcerative colitis. However, anyone with an inflammatory autoimmune disorder may have a higher risk of lymphoma. Talk with your doctor about your own risk. It is not known whether this medicine will harm an unborn baby. Tell your doctor if you are pregnant. Make sure any doctor caring for your  baby knows if you used adalimumab while you were pregnant. It may not be safe to breastfeed a baby while you are using this medicine. Ask your doctor about any risks. How should I use adalimumab? Follow all directions on your prescription label and read all medication guides or instruction sheets. Use the medicine exactly as directed. Adalimumab is injected under the skin. A healthcare provider will teach you how to properly use this medicine by yourself. Do not start using adalimumab if you have any signs of an infection. Call your doctor for instructions. Do not use adalimumab if you do not understand the instructions for proper use. Ask your doctor or pharmacist if you have any questions. The dose schedule for adalimumab is highly variable and depends on the condition you are treating. Follow your doctor's dosing instructions very carefully. Prepare your injection only when you are ready to give it.  Do not use if the medicine looks cloudy, has changed color, or has particles in it. Call your pharmacist for new medicine. Adalimumab affects your immune system. You may get infections more easily, even serious or fatal infections. Your doctor will need to examine you on a regular basis. Store this medicine in its original carton in a refrigerator. Do not freeze. If you are traveling, carefully follow all patient instructions for storing your medicine during travel. Avoid extreme heat or cold. Throw away any adalimumab that has become frozen. Use a needle and syringe only once and then place them in a puncture-proof \"sharps\" container. Follow state or local laws about how to dispose of this container. Keep it out of the reach of children and pets. What happens if I miss a dose? Use the medicine as soon as you remember, and then go back to your regular injection schedule. Do not use extra medicine to make up the missed dose. What happens if I overdose? Seek emergency medical attention or call the Poison Help line at 1-136.632.7294. What should I avoid while using adalimumab? Do not inject adalimumab into skin that is bruised, red, tender, or hard. Avoid being near people who are sick or have infections. Tell your doctor at once if you develop signs of infection. Do not receive a \"live\" vaccine while using adalimumab. The vaccine may not work as well during this time, and may not fully protect you from disease. Live vaccines include measles, mumps, rubella (MMR), polio, rotavirus, typhoid, yellow fever, varicella (chickenpox), or zoster (shingles). What are the possible side effects of adalimumab? Get emergency medical help if you have any of these signs of an allergic reaction: hives; difficulty breathing; swelling of your face, lips, tongue, or throat.   Stop using adalimumab and call your doctor right away if you have any symptoms of lymphoma:  · fever, swollen glands, night sweats, general feeling of illness;  · joint and muscle pain, skin rash, easy bruising or bleeding;  · pale skin, feeling light-headed or short of breath, cold hands and feet;  · pain in your upper stomach that may spread to your shoulder; or  · loss of appetite, feeling full after eating only a small amount, weight loss. Also call your doctor at once if you have:  · new or worsening psoriasis (raised, silvery flaking of the skin);  · a sore or bump on your skin that does not heal;  · liver problems --fever, body aches, tiredness, stomach pain, right-sided upper stomach pain, vomiting, loss of appetite, dark urine, taty-colored stools, jaundice (yellowing of the skin or eyes);  · lupus-like syndrome --joint pain or swelling, chest pain, shortness of breath, patchy skin color that worsens in sunlight;  · nerve problems --numbness, tingling, dizziness, vision problems, weakness in your arms or legs; or  · signs of tuberculosis --fever with ongoing cough, weight loss (fat or muscle). Older adults may be more likely to develop infections or cancer while using adalimumab. Common side effects may include:  · headache;  · cold symptoms such as stuffy nose, sinus pain, sneezing, sore throat;  · rash; or  · redness, bruising, itching, or swelling where the injection was given. This is not a complete list of side effects and others may occur. Call your doctor for medical advice about side effects. You may report side effects to FDA at 8-183-FDA-2147. What other drugs will affect adalimumab? Some drugs should not be used together with adalimumab. Tell your doctor about all medicines you use, and those you start or stop using during your treatment with adalimumab, especially:  · abatacept, etanercept;  · anakinra;  · azathioprine, mercaptopurine; or  · certolizumab, golimumab, infliximab, rituximab. This list is not complete. Other drugs may interact with adalimumab, including prescription and over-the-counter medicines, vitamins, and herbal products.  Not all possible interactions are listed in this medication guide. Where can I get more information? Your pharmacist can provide more information about adalimumab. Remember, keep this and all other medicines out of the reach of children, never share your medicines with others, and use this medication only for the indication prescribed. Every effort has been made to ensure that the information provided by [address] is accurate, up-to-date, and complete, but no guarantee is made to that effect. Drug information contained herein may be time sensitive. [facility] information has been compiled for use by healthcare practitioners and consumers in the [place] and therefore [facility] does not warrant that uses outside of the [place] are appropriate, unless specifically indicated otherwise. [facility]'s drug information does not endorse drugs, diagnose patients or recommend therapy. [facility]'s drug information is an informational resource designed to assist licensed healthcare practitioners in caring for their patients and/or to serve consumers viewing this service as a supplement to, and not a substitute for, the expertise, skill, knowledge and judgment of healthcare practitioners. The absence of a warning for a given drug or drug combination in no way should be construed to indicate that the drug or drug combination is safe, effective or appropriate for any given patient. [facility] does not assume any responsibility for any aspect of healthcare administered with the aid of information [facility] provides. The information contained herein is not intended to cover all possible uses, directions, precautions, warnings, drug interactions, allergic reactions, or adverse effects. If you have questions about the drugs you are taking, check with your doctor, nurse or pharmacist.  Copyright 9454-8661 Amanda [address] [place] [address] Avenue: 13.04. Revision date: 9/18/2020. Care instructions adapted under license by [facility] ([facility]).  If you have questions about a medical condition or this instruction, always ask your healthcare professional. [place] [address] any warranty or liability for your use of this information.

## 2022-01-13 NOTE — PROGRESS NOTES
Sid Dorantes 1960 is a 59 y.o. female, here for evaluation of the following chief complaint(s):  Follow-up (rheumatoid arthritis )         ASSESSMENT/PLAN:    Sid Dorantes 1960 is a 59 y.o. female seen in follow-up for rheumatoid arthritis. 1.  RF, CCP positive, nonerosive positive rheumatoid arthritis-  She has failed Plaquenil and methotrexate in the past.  Her inflammatory markers are elevated but she does not really have any striking synovitis on exam.  However, I am recommending Humira to stop any progression of disease. We discussed the risks, benefits, side effects and I have given her information on it. However, she would like to hold off and reevaluate in 3 months after she has had time to get her dental work and knee taken care of.  Given that her exam actually looks pretty good from a rheumatoid standpoint and there is no evidence of erosive disease I do not think that is an unreasonable way to go. Advised that if she has any flares in the meantime she can call for a short course of prednisone. 2.  Osteoarthritis-either way she has underlying osteoarthritis. Last visit we gave her naproxen 500 mg twice daily as needed with food. She feels like this may be helping to some degree. We did discuss that we could try switching to diclofenac but she would like to hold off on that for right now anyway. Her right knee is her biggest issue which ultimately needs replaced. She will be seeing Ortho in 2 weeks. 3.  Medication monitoring-up-to-date on TB and hepatitis. 4.  Osteopenia-FRAX is not elevated to warrant antiresorptive therapy. Continue vitamin D supplementation. Greater than 50% of this 25-minute visit was spent in counseling on rheumatoid arthritis, treatment options, prognosis, osteoarthritis, joint replacement. 1. Rheumatoid arthritis involving multiple sites with positive rheumatoid factor ([facility] [address]  2. Generalized osteoarthritis  3.  Medication monitoring encounter  4. Osteopenia of multiple sites      Return in about 3 months (around 4/13/2022). Subjective   SUBJECTIVE/OBJECTIVE:    HPI: Aspen Cordova 1960 is a 59 y.o. female seen in follow-up for rheumatoid arthritis. Last visit work-up did show positive CCP antibody in addition to previous high positive rheumatoid factor. Inflammatory markers were slightly elevated. She has a lot of osteoarthritis on her x-rays but fortunately no evidence of erosive disease. Her right knee continues to be her biggest issue more so related to OA. Will ultimately need replaced. She will be seeing Ortho in about 2 weeks. She states that she did have 1 flare since last visit. She had also been having issues with a stye in the left eye. The plan was for her to finish dental work and possibly have her knee done then proceed with Humira. With her other issues she has not had her dental work done yet and again will be seeing Ortho in about 2 weeks in regards to her knee. She has no extra-articular manifestations. Initial history: Patient had apparently seen 3 different rheumatologist in the past.  To thought she had rheumatoid arthritis, one did not. She was diagnosed about 10 years ago. She states at the time she was achy all over. 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 She has had multiple joint replacements including the left knee, left shoulder, both ankles. She has had her right first MCP joint fused in her right second DIP fused. She has had arthroscopic surgery on the right shoulder which is now giving her a little more trouble. She will ultimately need her right knee replaced as well. She has not seen a rheumatologist in 5 years actually. She was previously on Plaquenil and methotrexate but those did not help much. She currently takes Aleve which helps to some degree.   She has a strong family history of rheumatoid arthritis and does have a positive obvious synovitis on exam.  She does have crepitus in the right wrist and the right knee. Skin:     General: Skin is warm and dry. Findings: No rash. Neurological:      General: No focal deficit present. Mental Status: She is alert and oriented to person, place, and time. Mental status is at baseline. Psychiatric:         Mood and Affect: Mood normal.         Behavior: Behavior normal.            Lab Results   Component Value Date    WBC 6.1 11/16/2021    HGB 13.1 11/16/2021    HCT 42.4 11/16/2021    MCV 91.0 11/16/2021     11/16/2021     Lab Results   Component Value Date     11/16/2021    K 4.4 11/16/2021     11/16/2021    CO2 25 11/16/2021    BUN 10 11/16/2021    CREATININE 0.7 11/16/2021    GLUCOSE 94 11/16/2021    CALCIUM 9.6 11/16/2021    PROT 8.1 11/16/2021    LABALBU 4.8 11/16/2021    BILITOT 0.3 11/16/2021    ALKPHOS 127 (H) 11/16/2021    AST 23 11/16/2021    ALT 18 11/16/2021    LABGLOM >60 11/16/2021    GFRAA >60 11/16/2021     Lab Results   Component Value Date    SUSANA NEGATIVE 04/19/2021     No results found for: RHEUMFACTOR  Lab Results   Component Value Date    SEDRATE 42 (H) 11/16/2021     Lab Results   Component Value Date    CRP 1.1 (H) 11/16/2021            An electronic signature was used to authenticate this note. This note was generated with a voice recognition dictation system. Please disregard any errors or omission which have escaped my review.     --Kacy Enriquez, DO

## 2022-02-19 ENCOUNTER — OFFICE VISIT (OUTPATIENT)
Dept: PRIMARY CARE CLINIC | Age: 62
End: 2022-02-19
Payer: MEDICARE

## 2022-02-19 VITALS
TEMPERATURE: 97.1 F | SYSTOLIC BLOOD PRESSURE: 132 MMHG | BODY MASS INDEX: 31.58 KG/M2 | WEIGHT: 185 LBS | HEIGHT: 64 IN | DIASTOLIC BLOOD PRESSURE: 82 MMHG

## 2022-02-19 DIAGNOSIS — Z01.818 PRE-OP EXAM: Primary | ICD-10-CM

## 2022-02-19 DIAGNOSIS — M17.11 PRIMARY OSTEOARTHRITIS OF RIGHT KNEE: ICD-10-CM

## 2022-02-19 PROCEDURE — 93000 ELECTROCARDIOGRAM COMPLETE: CPT | Performed by: FAMILY MEDICINE

## 2022-02-19 PROCEDURE — 99213 OFFICE O/P EST LOW 20 MIN: CPT | Performed by: FAMILY MEDICINE

## 2022-02-19 ASSESSMENT — PATIENT HEALTH QUESTIONNAIRE - PHQ9
SUM OF ALL RESPONSES TO PHQ QUESTIONS 1-9: 0
1. LITTLE INTEREST OR PLEASURE IN DOING THINGS: 0
2. FEELING DOWN, DEPRESSED OR HOPELESS: 0
SUM OF ALL RESPONSES TO PHQ QUESTIONS 1-9: 0
SUM OF ALL RESPONSES TO PHQ9 QUESTIONS 1 & 2: 0
SUM OF ALL RESPONSES TO PHQ QUESTIONS 1-9: 0
SUM OF ALL RESPONSES TO PHQ QUESTIONS 1-9: 0

## 2022-02-19 NOTE — PROGRESS NOTES
22  Name: Lydia Coronel    : 1960    Sex: female    Age: 59 y.o. Subjective:  Chief Complaint: Patient is here for  Pre op right knee surgery     Feels well righ tknee pain      No cp or sob     For  Or   3-7          Review of Systems   Constitutional: Negative. HENT: Negative. Eyes: Negative. Respiratory: Negative. Cardiovascular: Negative. Gastrointestinal: Negative. Endocrine: Negative. Genitourinary: Negative. Musculoskeletal: Positive for arthralgias. Skin: Negative. Allergic/Immunologic: Negative. Neurological: Negative. Hematological: Negative. Psychiatric/Behavioral: Negative.           Current Outpatient Medications:     naproxen (NAPROSYN) 500 MG tablet, Take 1 tablet by mouth 2 times daily (with meals), Disp: 60 tablet, Rfl: 5    FLUoxetine (PROZAC) 20 MG capsule, Take 20 mg by mouth 3 times daily, Disp: , Rfl:     zolpidem (AMBIEN) 5 MG tablet, Take 2.5 mg by mouth nightly as needed for Sleep., Disp: , Rfl:     calcium carbonate (OSCAL) 500 MG TABS tablet, Take 500 mg by mouth daily, Disp: , Rfl:     Multiple Vitamins-Minerals (CENTRUM ADULTS PO), Take by mouth daily, Disp: , Rfl:     Omega-3 1000 MG CAPS, Take by mouth daily, Disp: , Rfl:     Cholecalciferol (VITAMIN D) 50 MCG (2000 UT) CAPS capsule, Take by mouth daily, Disp: , Rfl:     vitamin E 1000 units capsule, Take 1,000 Units by mouth daily, Disp: , Rfl:     Turmeric 500 MG CAPS, Take by mouth daily, Disp: , Rfl:     Ascorbic Acid (VITAMIN C) 250 MG tablet, Take 250 mg by mouth daily, Disp: , Rfl:   Allergies   Allergen Reactions    Iodides     Percocet [Oxycodone-Acetaminophen] Nausea And Vomiting and Other (See Comments)     Felt like \"bugs crawling on skin\"    Percodan [Oxycodone-Aspirin] Nausea And Vomiting     Social History     Socioeconomic History    Marital status:      Spouse name: Not on file    Number of children: Not on file    Years of education: Not on file    Highest education level: Not on file   Occupational History    Not on file   Tobacco Use    Smoking status: Former Smoker     Packs/day: 0.25     Years: 10.00     Pack years: 2.50     Quit date: 2010     Years since quittin.2    Smokeless tobacco: Never Used   Vaping Use    Vaping Use: Never used   Substance and Sexual Activity    Alcohol use: Yes     Comment: social    Drug use: No    Sexual activity: Not on file   Other Topics Concern    Not on file   Social History Narrative        DEPRESSION. RIGHT ANKLE SURGERIES TIMES THREE WITH CHRONIC SEVERE PAIN DUE TO INJURY FROM    . FATIGUE. Yesi Jennings  1960 Page #2    WEIGHT GAIN. FAMILY HISTORY OF DIABETES. IRRITABLE BOWEL SYNDROME. DEATH OF A SON 2 YEARS AGO. PROBABLY CARPAL TUNNEL SYNDROME. LEFT POSTERIOR NECK MASS.     POS RA ---DR SHEARER-----[facility]---TOLD NO RA BUT ONLY FIBROMYALGIA--    [facility] DR VEGA[facility]     SON IN [place]    FX L 10 11 12 RIBS    CTS OR 2010---RIGHT    COLON ---SEVERE TICS SIGMOID AND PAN TIC    EGD  GASTRITIS REPEATED IN  AND IMPROVED---    SMOKER QUIT AGE 46    EGD DR CHAUDHARI 3-16 AND SETTING UP PH STUDY    20 YR OL D SON SUICIDE  IN [place]-----PT SON HAS 5 KIDS    MOM  2017---AT AGE 80    TWO SONS--ONE -----OTHER MOVED FROM [place] TO [place] AND MOVED INTO [facility]---    LEFT ANKLE REPLACEMENT 3-21-18 DR Ko Chen----    RIGHT ANKLE REPLACEMENT  DR Ko Chen    Left shoulder OR  3-20  Dr Diann Zurita    Left total knee   dr Patiño Fitting with Ernesto Bowen cousin    Covid  vaccine   21   Brenda crookslv   dimer and [facility]    pre op clearance       Left  TKA  dr Geoff Carlisle    21    Right TKA   3-7-22   dr Geoff Carlisle     Social Determinants of Health     Financial Resource Strain:     Difficulty of Paying Living Expenses: Not on file   Food Insecurity:     Worried About Running Out of Food in the Last Year: Not on file    Ran Out of Food in the Last Year: Not on file   Transportation Needs:     Lack of Transportation (Medical): Not on file    Lack of Transportation (Non-Medical):  Not on file   Physical Activity:     Days of Exercise per Week: Not on file    Minutes of Exercise per Session: Not on file   Stress:     Feeling of Stress : Not on file   Social Connections:     Frequency of Communication with Friends and Family: Not on file    Frequency of Social Gatherings with Friends and Family: Not on file    Attends Quaker Services: Not on file    Active Member of [address] [place] [address] [employer] or Organizations: Not on file    Attends Club or Organization Meetings: Not on file    Marital Status: Not on file   Intimate Partner Violence:     Fear of Current or Ex-Partner: Not on file    Emotionally Abused: Not on file    Physically Abused: Not on file    Sexually Abused: Not on file   Housing Stability:     Unable to Pay for Housing in the Last Year: Not on file    Number of [place] in the Last Year: Not on file    Unstable Housing in the Last Year: Not on file      Past Medical History:   Diagnosis Date    Childhood asthma     Depression with anxiety     Diverticulitis     Fatigue     Fracture, ribs     10 11 12 ribs     History of stomach ulcers     IBS (irritable bowel syndrome)     Low vitamin B12 level     PTSD (post-traumatic stress disorder)     RA (rheumatoid arthritis) ([facility] [address]     right ankle    RA    Weight gain      Family History   Family history unknown: Yes      Past Surgical History:   Procedure Laterality Date    ACHILLES TENDON SURGERY Right 2/20/2019    RIGHT ACHILLES TENDON LENGTHENING performed by Radha Bess MD at [place] [address] Bilateral     CARPAL TUNNEL RELEASE Right     CARPAL TUNNEL RELEASE Right 06/2010    COLONOSCOPY  11/2012    severe TICS Sigmoid and Pan Tic     FOOT NEUROMA SURGERY      bilat    KNEE ARTHROSCOPY left    OTHER SURGICAL HISTORY      excision neck mass    VT TOTAL ANKLE REPLACEMENT Left 3/21/2018    LEFT TOTAL ANKLE REPLACEMENT LEFT FOOT CHEILECTOMY   [facility]) performed by Eric Wright MD at [address] ARTHROSCOPY Right     TONSILLECTOMY      TOTAL ANKLE ARTHROPLASTY Right 2/20/2019    RIGHT ANKLE TOTAL ARTHROPLASTY performed by Eric Wright MD at [place] [address]  4/4/2016    UPPER GASTROINTESTINAL ENDOSCOPY  11/2012    repeated 02-13 and improved       Vitals:    02/19/22 1036   BP: 132/82   Temp: 97.1 °F (36.2 °C)   TempSrc: Infrared   Weight: 185 lb (83.9 kg)   Height: 5' 4\" (1.626 m)       Objective:    Physical Exam  Vitals reviewed. Constitutional:       Appearance: She is well-developed. HENT:      Head: Normocephalic. Eyes:      Pupils: Pupils are equal, round, and reactive to light. Cardiovascular:      Rate and Rhythm: Normal rate and regular rhythm. Pulmonary:      Effort: Pulmonary effort is normal.      Breath sounds: Normal breath sounds. Abdominal:      Palpations: Abdomen is soft. Musculoskeletal:      Cervical back: Normal range of motion. Comments: Dec rom right knee   Skin:     General: Skin is warm. Neurological:      Mental Status: She is alert and oriented to person, place, and time. Psychiatric:         Behavior: Behavior normal.         Yesi was seen today for pre-op exam.    Diagnoses and all orders for this visit:    Pre-op exam  -     EKG 12 lead; Future  -     EKG 12 lead    Primary osteoarthritis of right knee        Comments: ekg   Normal and no chg    PATIENT IS CLEARED FOR KNEE SURGERY      I educated the patient about all medications. Make sure they were correct and educated  on the risk associated with missing meds or taking them incorrectly. A list of medications is being sent home with patient today.     ./A great deal of time spent reviewing medications, diet, exercise, social issues. Also reviewing the chart before entering the room with patient and finishing charting after leaving patient's room. More than half of that time was spent face to face with the patient in counseling and coordinating care. Follow Up: Return for Reg Appt.      Seen by:  Nathan Travis DO

## 2022-02-21 ENCOUNTER — HOSPITAL ENCOUNTER (OUTPATIENT)
Dept: CT IMAGING | Age: 62
Discharge: HOME | End: 2022-02-23
Payer: MEDICARE

## 2022-02-21 DIAGNOSIS — M17.11 ARTHRITIS OF KNEE, RIGHT: ICD-10-CM

## 2022-02-21 PROCEDURE — 73700 CT LOWER EXTREMITY W/O DYE: CPT

## 2022-06-03 ENCOUNTER — OFFICE VISIT (OUTPATIENT)
Dept: PRIMARY CARE CLINIC | Age: 62
End: 2022-06-03
Payer: MEDICARE

## 2022-06-03 VITALS
OXYGEN SATURATION: 95 % | WEIGHT: 183 LBS | HEART RATE: 86 BPM | DIASTOLIC BLOOD PRESSURE: 78 MMHG | TEMPERATURE: 97.5 F | BODY MASS INDEX: 31.24 KG/M2 | HEIGHT: 64 IN | SYSTOLIC BLOOD PRESSURE: 128 MMHG

## 2022-06-03 DIAGNOSIS — R05.9 COUGH: ICD-10-CM

## 2022-06-03 DIAGNOSIS — J01.80 ACUTE NON-RECURRENT SINUSITIS OF OTHER SINUS: Primary | ICD-10-CM

## 2022-06-03 PROCEDURE — 99213 OFFICE O/P EST LOW 20 MIN: CPT | Performed by: FAMILY MEDICINE

## 2022-06-03 RX ORDER — METHYLPREDNISOLONE 4 MG/1
TABLET ORAL
Qty: 1 KIT | Refills: 0 | Status: SHIPPED
Start: 2022-06-03 | End: 2022-08-25

## 2022-06-03 RX ORDER — CEFDINIR 300 MG/1
300 CAPSULE ORAL 2 TIMES DAILY
Qty: 20 CAPSULE | Refills: 0 | Status: SHIPPED | OUTPATIENT
Start: 2022-06-03 | End: 2022-06-13

## 2022-06-03 ASSESSMENT — ENCOUNTER SYMPTOMS
EYES NEGATIVE: 1
ALLERGIC/IMMUNOLOGIC NEGATIVE: 1
GASTROINTESTINAL NEGATIVE: 1
COUGH: 1

## 2022-06-03 ASSESSMENT — PATIENT HEALTH QUESTIONNAIRE - PHQ9
SUM OF ALL RESPONSES TO PHQ QUESTIONS 1-9: 0
SUM OF ALL RESPONSES TO PHQ QUESTIONS 1-9: 0
SUM OF ALL RESPONSES TO PHQ9 QUESTIONS 1 & 2: 0
SUM OF ALL RESPONSES TO PHQ QUESTIONS 1-9: 0
1. LITTLE INTEREST OR PLEASURE IN DOING THINGS: 0
2. FEELING DOWN, DEPRESSED OR HOPELESS: 0
SUM OF ALL RESPONSES TO PHQ QUESTIONS 1-9: 0

## 2022-06-03 NOTE — PROGRESS NOTES
6/3/22  Name: Magdalene Luque    : 1960    Sex: female    Age: 59 y.o. Subjective:  Chief Complaint: Patient is here for, sore throat, sinus, chills    3 days. No change in taste or smell      Review of Systems   Constitutional: Positive for chills. Negative for diaphoresis, fatigue and fever. HENT: Positive for postnasal drip. Eyes: Negative. Respiratory: Positive for cough. Cardiovascular: Negative. Gastrointestinal: Negative. Endocrine: Negative. Genitourinary: Negative. Musculoskeletal: Negative. Skin: Negative. Allergic/Immunologic: Negative. Neurological: Negative. Hematological: Negative. Psychiatric/Behavioral: Negative.           Current Outpatient Medications:     cefdinir (OMNICEF) 300 MG capsule, Take 1 capsule by mouth 2 times daily for 10 days, Disp: 20 capsule, Rfl: 0    methylPREDNISolone (MEDROL DOSEPACK) 4 MG tablet, Take by mouth., Disp: 1 kit, Rfl: 0    naproxen (NAPROSYN) 500 MG tablet, Take 1 tablet by mouth 2 times daily (with meals), Disp: 60 tablet, Rfl: 5    FLUoxetine (PROZAC) 20 MG capsule, Take 20 mg by mouth 3 times daily, Disp: , Rfl:     zolpidem (AMBIEN) 5 MG tablet, Take 2.5 mg by mouth nightly as needed for Sleep., Disp: , Rfl:     calcium carbonate (OSCAL) 500 MG TABS tablet, Take 500 mg by mouth daily, Disp: , Rfl:     Multiple Vitamins-Minerals (CENTRUM ADULTS PO), Take by mouth daily, Disp: , Rfl:     Omega-3 1000 MG CAPS, Take by mouth daily, Disp: , Rfl:     Cholecalciferol (VITAMIN D) 50 MCG (2000 UT) CAPS capsule, Take by mouth daily, Disp: , Rfl:     vitamin E 1000 units capsule, Take 1,000 Units by mouth daily, Disp: , Rfl:     Turmeric 500 MG CAPS, Take by mouth daily, Disp: , Rfl:     Ascorbic Acid (VITAMIN C) 250 MG tablet, Take 250 mg by mouth daily, Disp: , Rfl:   Allergies   Allergen Reactions    Iodides     Percocet [Oxycodone-Acetaminophen] Nausea And Vomiting and Other (See Comments)     Felt like \"bugs crawling on skin\"    Percodan [Oxycodone-Aspirin] Nausea And Vomiting     Social History     Socioeconomic History    Marital status:      Spouse name: Not on file    Number of children: Not on file    Years of education: Not on file    Highest education level: Not on file   Occupational History    Not on file   Tobacco Use    Smoking status: Former Smoker     Packs/day: 0.25     Years: 10.00     Pack years: 2.50     Quit date: 2010     Years since quittin.5    Smokeless tobacco: Never Used   Vaping Use    Vaping Use: Never used   Substance and Sexual Activity    Alcohol use: Yes     Comment: social    Drug use: No    Sexual activity: Not on file   Other Topics Concern    Not on file   Social History Narrative        DEPRESSION. RIGHT ANKLE SURGERIES TIMES THREE WITH CHRONIC SEVERE PAIN DUE TO INJURY FROM    . FATIGUE. Yesi Romoon  1960 Page #2    WEIGHT GAIN. FAMILY HISTORY OF DIABETES. IRRITABLE BOWEL SYNDROME. DEATH OF A SON 2 YEARS AGO. PROBABLY CARPAL TUNNEL SYNDROME. LEFT POSTERIOR NECK MASS.     POS RA ---DR SHEARER-----[facility]---TOLD NO RA BUT ONLY FIBROMYALGIA--    CHANGED TO DR VEGA---PT REFUSING EMBREL    LOW B-12     SON IN [place]    FX L 10 11 12 RIBS    CTS OR 2010---RIGHT    COLON ---SEVERE TICS SIGMOID AND PAN TIC    EGD  GASTRITIS REPEATED IN  AND IMPROVED---    SMOKER QUIT AGE 46    EGD DR CHAUDHARI 3-16 AND SETTING UP PH STUDY    20 YR OL D SON SUICIDE  IN [place]-----PT SON HAS 5 KIDS    MOM  2017---AT AGE 80    TWO SONS--ONE -----OTHER MOVED FROM [place] TO [place] AND MOVED INTO [facility]---    LEFT ANKLE REPLACEMENT 3-21-18 DR Brynn Rodriguez----    RIGHT ANKLE REPLACEMENT  DR Brynn Rodriguez    Left shoulder OR  3-20  Dr Matthieu Upton    Left total knee   dr Bonnie Maharaj with Deanna Mcgee cousin    Covid  vaccine   21   Brenda darling   dimer and [facility]    Cori Martinez MD at [place] [address] Bilateral     CARPAL TUNNEL RELEASE Right     CARPAL TUNNEL RELEASE Right 06/2010    COLONOSCOPY  11/2012    severe TICS Sigmoid and Pan Tic     FOOT NEUROMA SURGERY      bilat    KNEE ARTHROSCOPY      left    OTHER SURGICAL HISTORY      excision neck mass    GA TOTAL ANKLE REPLACEMENT Left 3/21/2018    LEFT TOTAL ANKLE REPLACEMENT LEFT FOOT CHEILECTOMY   ([facility]) performed by Geo White MD at [address] [place] [address] ARTHROSCOPY Right     TONSILLECTOMY      TOTAL ANKLE ARTHROPLASTY Right 2/20/2019    RIGHT ANKLE TOTAL ARTHROPLASTY performed by Geo White MD at [address] [place] [address] ENDOSCOPY  4/4/2016    UPPER GASTROINTESTINAL ENDOSCOPY  11/2012    repeated 02-13 and improved       Vitals:    06/03/22 1352   BP: 128/78   Pulse: 86   Temp: 97.5 °F (36.4 °C)   SpO2: 95%   Weight: 183 lb (83 kg)   Height: 5' 4\" (1.626 m)       Objective:    Physical Exam  Vitals reviewed. Constitutional:       Appearance: She is well-developed. HENT:      Head: Normocephalic. Eyes:      Pupils: Pupils are equal, round, and reactive to light. Cardiovascular:      Rate and Rhythm: Normal rate and regular rhythm. Pulmonary:      Effort: Pulmonary effort is normal.      Breath sounds: Normal breath sounds. Abdominal:      Palpations: Abdomen is soft. Musculoskeletal:         General: Normal range of motion. Cervical back: Normal range of motion. Skin:     General: Skin is warm. Neurological:      Mental Status: She is alert and oriented to person, place, and time. Psychiatric:         Behavior: Behavior normal.         Yesi was seen today for cough, pharyngitis and sinusitis. Diagnoses and all orders for this visit:    Acute non-recurrent sinusitis of other sinus  -     cefdinir (OMNICEF) 300 MG capsule;  Take 1 capsule by mouth 2 times daily for 10 days  -     methylPREDNISolone (MEDROL DOSEPACK) 4 MG tablet; Take by mouth. Cough        Comments: covid neg ab  Not great se      I educated the patient about all medications. Make sure they were correct and educated  on the risk associated with missing meds or taking them incorrectly. A list of medications is being sent home with patient today. Check blood pressure at home twice a day. Low-salt low caffeine diet. Call if systolic blood pressure is above 337 and diastolic blood pressures above 85. Only use a upper arm digital cuff. I informed patient about the risk associated with noncompliance of medication and taking medications incorrectly. Appropriate follow-up with myself and all specialist.  Encourage family members to take active role in assisting with medications and medical care. If any confusion should develop to notify my office immediately to avoid risk of worsening medical condition    A great deal of time spent reviewing medications, diet, exercise, social issues. Also reviewing the chart before entering the room with patient and finishing charting after leaving patient's room. More than half of that time was spent face to face with the patient in counseling and coordinating care. Follow Up: Return if symptoms worsen or fail to improve, for Meds. If worse go to ER. Not better in 24 hr see.      Seen by:  Magdalene Gunn, DO

## 2022-06-15 ENCOUNTER — OFFICE VISIT (OUTPATIENT)
Dept: RHEUMATOLOGY | Age: 62
End: 2022-06-15
Payer: MEDICARE

## 2022-06-15 VITALS
HEART RATE: 67 BPM | WEIGHT: 180 LBS | BODY MASS INDEX: 30.73 KG/M2 | DIASTOLIC BLOOD PRESSURE: 72 MMHG | HEIGHT: 64 IN | RESPIRATION RATE: 16 BRPM | SYSTOLIC BLOOD PRESSURE: 120 MMHG | OXYGEN SATURATION: 97 %

## 2022-06-15 DIAGNOSIS — M15.9 GENERALIZED OSTEOARTHRITIS: ICD-10-CM

## 2022-06-15 DIAGNOSIS — M94.9 DISORDER OF CARTILAGE, UNSPECIFIED: ICD-10-CM

## 2022-06-15 DIAGNOSIS — M05.79 RHEUMATOID ARTHRITIS INVOLVING MULTIPLE SITES WITH POSITIVE RHEUMATOID FACTOR (HCC): Primary | Chronic | ICD-10-CM

## 2022-06-15 DIAGNOSIS — M05.79 RHEUMATOID ARTHRITIS INVOLVING MULTIPLE SITES WITH POSITIVE RHEUMATOID FACTOR (HCC): Chronic | ICD-10-CM

## 2022-06-15 DIAGNOSIS — M85.89 OSTEOPENIA OF MULTIPLE SITES: ICD-10-CM

## 2022-06-15 LAB
ALBUMIN SERPL-MCNC: 4.4 G/DL (ref 3.5–5.2)
ALP BLD-CCNC: 134 U/L (ref 35–104)
ALT SERPL-CCNC: 18 U/L (ref 0–32)
ANION GAP SERPL CALCULATED.3IONS-SCNC: 13 MMOL/L (ref 7–16)
AST SERPL-CCNC: 21 U/L (ref 0–31)
BASOPHILS ABSOLUTE: 0.04 E9/L (ref 0–0.2)
BASOPHILS RELATIVE PERCENT: 0.5 % (ref 0–2)
BILIRUB SERPL-MCNC: 0.3 MG/DL (ref 0–1.2)
BUN BLDV-MCNC: 12 MG/DL (ref 6–23)
C-REACTIVE PROTEIN: 0.9 MG/DL (ref 0–0.4)
CALCIUM SERPL-MCNC: 9.3 MG/DL (ref 8.6–10.2)
CHLORIDE BLD-SCNC: 100 MMOL/L (ref 98–107)
CO2: 24 MMOL/L (ref 22–29)
CREAT SERPL-MCNC: 0.8 MG/DL (ref 0.5–1)
EOSINOPHILS ABSOLUTE: 0.19 E9/L (ref 0.05–0.5)
EOSINOPHILS RELATIVE PERCENT: 2.6 % (ref 0–6)
GFR AFRICAN AMERICAN: >60
GFR NON-AFRICAN AMERICAN: >60 ML/MIN/1.73
GLUCOSE BLD-MCNC: 98 MG/DL (ref 74–99)
HCT VFR BLD CALC: 42.9 % (ref 34–48)
HEMOGLOBIN: 12.9 G/DL (ref 11.5–15.5)
IMMATURE GRANULOCYTES #: 0.01 E9/L
IMMATURE GRANULOCYTES %: 0.1 % (ref 0–5)
LYMPHOCYTES ABSOLUTE: 1.99 E9/L (ref 1.5–4)
LYMPHOCYTES RELATIVE PERCENT: 27 % (ref 20–42)
MCH RBC QN AUTO: 27.3 PG (ref 26–35)
MCHC RBC AUTO-ENTMCNC: 30.1 % (ref 32–34.5)
MCV RBC AUTO: 90.9 FL (ref 80–99.9)
MONOCYTES ABSOLUTE: 0.54 E9/L (ref 0.1–0.95)
MONOCYTES RELATIVE PERCENT: 7.3 % (ref 2–12)
NEUTROPHILS ABSOLUTE: 4.6 E9/L (ref 1.8–7.3)
NEUTROPHILS RELATIVE PERCENT: 62.5 % (ref 43–80)
PDW BLD-RTO: 15.3 FL (ref 11.5–15)
PLATELET # BLD: 225 E9/L (ref 130–450)
PMV BLD AUTO: 12.3 FL (ref 7–12)
POTASSIUM SERPL-SCNC: 4.6 MMOL/L (ref 3.5–5)
RBC # BLD: 4.72 E12/L (ref 3.5–5.5)
SEDIMENTATION RATE, ERYTHROCYTE: 23 MM/HR (ref 0–20)
SODIUM BLD-SCNC: 137 MMOL/L (ref 132–146)
TOTAL PROTEIN: 7.7 G/DL (ref 6.4–8.3)
VITAMIN D 25-HYDROXY: 42 NG/ML (ref 30–100)
WBC # BLD: 7.4 E9/L (ref 4.5–11.5)

## 2022-06-15 PROCEDURE — 99214 OFFICE O/P EST MOD 30 MIN: CPT | Performed by: INTERNAL MEDICINE

## 2022-06-15 ASSESSMENT — ENCOUNTER SYMPTOMS
COUGH: 0
COLOR CHANGE: 0
TROUBLE SWALLOWING: 0
DIARRHEA: 0
VOMITING: 0
ABDOMINAL PAIN: 0
SHORTNESS OF BREATH: 0
NAUSEA: 0

## 2022-06-15 NOTE — PROGRESS NOTES
Jacobo Eldridge 1960 is a 59 y.o. female, here for evaluation of the following chief complaint(s):  3 Month Follow-Up (rheumatoid arthritis-no concerns )         ASSESSMENT/PLAN:    Jacobo Eldridge 1960 is a 59 y.o. female seen in follow-up for rheumatoid arthritis. 1.  RF positive, CCP positive, nonerosive positive rheumatoid arthritis-  She has failed Plaquenil and methotrexate in the past.  Her RF and CCP are both high positive, inflammatory markers are elevated but she has only subtle synovitis in a few PIP joints on exam.   I am recommending Humira to stop any progression of disease even though she is not really having a ton of joint pain. We discussed the risks, benefits, side effects and I have given her information on it. However, she hesitant to start any new medications. Since there is no evidence of erosive disease I do not think that is a totally unreasonable way to go. For now we will continue to monitor closely but if she starts to have more joint pain and swelling or there is any evidence of joint damage on x-rays next visit we will need to proceed with Humira. We will update inflammation markers. 2.  Osteoarthritis-either way she has underlying osteoarthritis. Last visit we gave her naproxen 500 mg twice daily as needed with food. She has not really needed it lately. She has had both knees replaced and her left shoulder replaced. She is having some issues with her right shoulder and will be going to see Ortho. There is no obvious swelling in the shoulder. 3.   Osteopenia-FRAX is not elevated to warrant antiresorptive therapy. Continue vitamin D supplementation. We will update vitamin D level. Bone density scan will be December 2023.    1. Rheumatoid arthritis involving multiple sites with positive rheumatoid factor (HCC)  -     CBC with Auto Differential; Future  -     Comprehensive Metabolic Panel; Future  -     C-Reactive Protein;  Future  -     Sedimentation Rate; Future  -     Vitamin D 25 Hydroxy; Future  2. Disorder of cartilage, unspecified   -     Vitamin D 25 Hydroxy; Future  3. Generalized osteoarthritis  4. Osteopenia of multiple sites      Return in about 6 months (around 12/15/2022). Subjective   SUBJECTIVE/OBJECTIVE:    HPI: Marisela Pruitt 1960 is a 59 y.o. female seen in follow-up for rheumatoid arthritis. Visit my recommendation was to start Humira but we held off because of upcoming right knee replacement which was successful. She does still have some dental work that needs done. She is doing well after her knee replacement. Other than her right shoulder she does not really complain of much joint pain and swelling. She has not really needed to take naproxen. She has no extra-articular manifestations. Initial history: Patient had apparently seen 3 different rheumatologist in the past.  To thought she had rheumatoid arthritis, one did not. She was diagnosed about 10 years ago. She states at the time she was achy all over. 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 She has had multiple joint replacements including the left knee, left shoulder, both ankles. She has had her right first MCP joint fused in her right second DIP fused. She has had arthroscopic surgery on the right shoulder which is now giving her a little more trouble. She will ultimately need her right knee replaced as well. She has not seen a rheumatologist in 5 years actually. She was previously on Plaquenil and methotrexate but those did not help much. She currently takes Aleve which helps to some degree. She has a strong family history of rheumatoid arthritis and does have a positive rheumatoid factor of 94. She has a negative SUSANA. Aside from the right shoulder and right knee her hands and wrists bother her but she also just feels generally achy. She does not really get a whole lot of joint swelling.   She has no extra-articular manifestations. Past Medical History:   Diagnosis Date    Childhood asthma     Depression with anxiety     Diverticulitis     Fatigue     Fracture, ribs     10 11 12 ribs     History of stomach ulcers     IBS (irritable bowel syndrome)     Low vitamin B12 level     PTSD (post-traumatic stress disorder)     RA (rheumatoid arthritis) ([facility])     right ankle    RA    Weight gain         Review of Systems   Constitutional: Negative for fatigue and fever. HENT: Negative for mouth sores and trouble swallowing. Respiratory: Negative for cough and shortness of breath. Cardiovascular: Negative for chest pain. Gastrointestinal: Negative for abdominal pain, diarrhea, nausea and vomiting. Genitourinary: Negative for dysuria and hematuria. Musculoskeletal: Positive for arthralgias. Negative for joint swelling. Skin: Negative for color change and rash. Neurological: Negative for weakness and numbness. Hematological: Negative for adenopathy. All other systems reviewed and are negative. Objective   Vitals:    06/15/22 1434   BP: 120/72   Pulse: 67   Resp: 16   SpO2: 97%      Physical Exam  Constitutional:       General: She is not in acute distress. Appearance: Normal appearance. HENT:      Head: Normocephalic and atraumatic. Right Ear: External ear normal.      Left Ear: External ear normal.      Nose: Nose normal.   Eyes:      General: No scleral icterus. Pulmonary:      Effort: Pulmonary effort is normal.   Musculoskeletal:         General: Swelling and deformity present. No tenderness. Comments: She has Heberden's nodes and squaring of the first CMC joints. There is some subtle in the second and third DIP joints on the right and the third and fourth DIP joints on the left. Skin:     General: Skin is warm and dry. Findings: No rash. Neurological:      General: No focal deficit present.       Mental Status: She is alert and oriented to person, place, and time. Mental status is at baseline. Psychiatric:         Mood and Affect: Mood normal.         Behavior: Behavior normal.            Lab Results   Component Value Date    WBC 6.1 11/16/2021    HGB 13.1 11/16/2021    HCT 42.4 11/16/2021    MCV 91.0 11/16/2021     11/16/2021     Lab Results   Component Value Date     11/16/2021    K 4.4 11/16/2021     11/16/2021    CO2 25 11/16/2021    BUN 10 11/16/2021    CREATININE 0.7 11/16/2021    GLUCOSE 94 11/16/2021    CALCIUM 9.6 11/16/2021    PROT 8.1 11/16/2021    LABALBU 4.8 11/16/2021    BILITOT 0.3 11/16/2021    ALKPHOS 127 (H) 11/16/2021    AST 23 11/16/2021    ALT 18 11/16/2021    LABGLOM >60 11/16/2021    GFRAA >60 11/16/2021     Lab Results   Component Value Date    SUSANA NEGATIVE 04/19/2021     No results found for: RHEUMFACTOR  Lab Results   Component Value Date    SEDRATE 42 (H) 11/16/2021     Lab Results   Component Value Date    CRP 1.1 (H) 11/16/2021            An electronic signature was used to authenticate this note. This note was generated with a voice recognition dictation system. Please disregard any errors or omission which have escaped my review.     --Bertha Kim,

## 2022-06-21 ENCOUNTER — OFFICE VISIT (OUTPATIENT)
Dept: PRIMARY CARE CLINIC | Age: 62
End: 2022-06-21
Payer: MEDICARE

## 2022-06-21 VITALS
WEIGHT: 184 LBS | TEMPERATURE: 98.3 F | DIASTOLIC BLOOD PRESSURE: 78 MMHG | BODY MASS INDEX: 31.58 KG/M2 | SYSTOLIC BLOOD PRESSURE: 134 MMHG

## 2022-06-21 DIAGNOSIS — I10 ESSENTIAL HYPERTENSION: ICD-10-CM

## 2022-06-21 DIAGNOSIS — R53.83 OTHER FATIGUE: ICD-10-CM

## 2022-06-21 DIAGNOSIS — J30.89 NON-SEASONAL ALLERGIC RHINITIS DUE TO OTHER ALLERGIC TRIGGER: ICD-10-CM

## 2022-06-21 DIAGNOSIS — M15.9 GENERALIZED OSTEOARTHRITIS: ICD-10-CM

## 2022-06-21 DIAGNOSIS — M05.79 RHEUMATOID ARTHRITIS INVOLVING MULTIPLE SITES WITH POSITIVE RHEUMATOID FACTOR (HCC): Primary | Chronic | ICD-10-CM

## 2022-06-21 DIAGNOSIS — M81.0 AGE-RELATED OSTEOPOROSIS WITHOUT CURRENT PATHOLOGICAL FRACTURE: ICD-10-CM

## 2022-06-21 DIAGNOSIS — E11.9 DIET-CONTROLLED DIABETES MELLITUS (HCC): ICD-10-CM

## 2022-06-21 DIAGNOSIS — E03.9 ACQUIRED HYPOTHYROIDISM: ICD-10-CM

## 2022-06-21 PROCEDURE — 99214 OFFICE O/P EST MOD 30 MIN: CPT | Performed by: FAMILY MEDICINE

## 2022-06-21 ASSESSMENT — ENCOUNTER SYMPTOMS
ALLERGIC/IMMUNOLOGIC NEGATIVE: 1
RESPIRATORY NEGATIVE: 1
EYES NEGATIVE: 1
GASTROINTESTINAL NEGATIVE: 1

## 2022-06-21 NOTE — PROGRESS NOTES
22  Name: Alexander Payment    : 1960    Sex: female    Age: 59 y.o. Sinus   driagne mucus    Lab  Form rheum to reviwbriseida   arth    RA    Subjective:  Chief Complaint: Patient is here for as above  And fatigued    Neck and back pain    Lab ntoed form rheum       Esr  Down     crp  Down     Sinus driang emucus   no  Sore throat  Tired n am   No  Sleep in afternoon  She not sure of snore      Review of Systems   Constitutional: Positive for fatigue. HENT: Positive for postnasal drip. Eyes: Negative. Respiratory: Negative. Cardiovascular: Negative. Gastrointestinal: Negative. Endocrine: Negative. Genitourinary: Negative. Musculoskeletal: Positive for arthralgias. Skin: Negative. Allergic/Immunologic: Negative. Neurological: Negative. Hematological: Negative. Psychiatric/Behavioral: Negative.           Current Outpatient Medications:     methylPREDNISolone (MEDROL DOSEPACK) 4 MG tablet, Take by mouth., Disp: 1 kit, Rfl: 0    naproxen (NAPROSYN) 500 MG tablet, Take 1 tablet by mouth 2 times daily (with meals), Disp: 60 tablet, Rfl: 5    FLUoxetine (PROZAC) 20 MG capsule, Take 20 mg by mouth 3 times daily, Disp: , Rfl:     zolpidem (AMBIEN) 5 MG tablet, Take 2.5 mg by mouth nightly as needed for Sleep., Disp: , Rfl:     calcium carbonate (OSCAL) 500 MG TABS tablet, Take 500 mg by mouth daily, Disp: , Rfl:     Multiple Vitamins-Minerals (CENTRUM ADULTS PO), Take by mouth daily, Disp: , Rfl:     Omega-3 1000 MG CAPS, Take by mouth daily, Disp: , Rfl:     Cholecalciferol (VITAMIN D) 50 MCG (2000 UT) CAPS capsule, Take by mouth daily, Disp: , Rfl:     vitamin E 1000 units capsule, Take 1,000 Units by mouth daily, Disp: , Rfl:     Turmeric 500 MG CAPS, Take by mouth daily, Disp: , Rfl:     Ascorbic Acid (VITAMIN C) 250 MG tablet, Take 250 mg by mouth daily, Disp: , Rfl:   Allergies   Allergen Reactions    Iodides     Percocet [Oxycodone-Acetaminophen] Nausea And Vomiting and Other (See Comments)     Felt like \"bugs crawling on skin\"    Percodan [Oxycodone-Aspirin] Nausea And Vomiting     Social History     Socioeconomic History    Marital status:      Spouse name: Not on file    Number of children: Not on file    Years of education: Not on file    Highest education level: Not on file   Occupational History    Not on file   Tobacco Use    Smoking status: Former Smoker     Packs/day: 0.25     Years: 10.00     Pack years: 2.50     Quit date: 2010     Years since quittin.6    Smokeless tobacco: Never Used   Vaping Use    Vaping Use: Never used   Substance and Sexual Activity    Alcohol use: Yes     Comment: social    Drug use: No    Sexual activity: Not on file   Other Topics Concern    Not on file   Social History Narrative        DEPRESSION. RIGHT ANKLE SURGERIES TIMES THREE WITH CHRONIC SEVERE PAIN DUE TO INJURY FROM    . FATIGUE. Yesi EZE Dick  1960 Page #2    WEIGHT GAIN. FAMILY HISTORY OF DIABETES. IRRITABLE BOWEL SYNDROME. DEATH OF A SON 2 YEARS AGO. PROBABLY CARPAL TUNNEL SYNDROME. LEFT POSTERIOR NECK MASS.     POS RA ---DR SHEARER-----[facility]---TOLD NO RA BUT ONLY FIBROMYALGIA--    CHANGED TO DR VEGA---PT REFUSING EMBREL    LOW B-12     SON IN [place]    FX L 10 11 12 RIBS    CTS OR 2010---RIGHT    COLON ---SEVERE TICS SIGMOID AND PAN TIC    EGD  GASTRITIS REPEATED IN  AND IMPROVED---    SMOKER QUIT AGE 46    EGD DR CHAUDHARI 3-16 AND SETTING UP PH STUDY    20 YR OL D SON SUICIDE  IN [place]-----PT SON HAS 5 KIDS    MOM  2017---AT AGE 80    TWO SONS--ONE -----OTHER MOVED FROM [place] TO [place] AND MOVED INTO [facility]---    LEFT ANKLE REPLACEMENT 3-21- DR Luiz López----    RIGHT ANKLE REPLACEMENT  DR Luiz López    Left shoulder OR  3-20  Dr Preet Sterling    Left total knee   dr Yaw Reilly with yumiko haney    Covid  vaccine   21   [facility] geo and sent to [facility]   5-21 pre op clearance       Left  TKA  dr Milly Cohen    6-14-21    Right TKA   3-7-22     Anuj Mills son volunteering in [facility] to fight  6-22     Social Determinants of Health     Financial Resource Strain:     Difficulty of Paying Living Expenses: Not on file   Food Insecurity:     Worried About [address] in the Last Year: Not on file    Claudia of Food in the Last Year: Not on file   Transportation Needs:     Lack of Transportation (Medical): Not on file    Lack of Transportation (Non-Medical):  Not on file   Physical Activity:     Days of Exercise per Week: Not on file    Minutes of Exercise per Session: Not on file   Stress:     Feeling of Stress : Not on file   Social Connections:     Frequency of Communication with Friends and Family: Not on file    Frequency of Social Gatherings with Friends and Family: Not on file    Attends [employer] Services: Not on file    Active Member of [address] [place] [address] or Organizations: Not on file    Attends Club or Organization Meetings: Not on file    Marital Status: Not on file   Intimate Partner Violence:     Fear of Current or Ex-Partner: Not on file    Emotionally Abused: Not on file    Physically Abused: Not on file    Sexually Abused: Not on file   Housing Stability:     Unable to Pay for Housing in the Last Year: Not on file    Number of [place] in the Last Year: Not on file    Unstable Housing in the Last Year: Not on file      Past Medical History:   Diagnosis Date    Childhood asthma     Depression with anxiety     Diverticulitis     Fatigue     Fracture, ribs     10 11 12 ribs     History of stomach ulcers     IBS (irritable bowel syndrome)     Low vitamin B12 level     PTSD (post-traumatic stress disorder)     RA (rheumatoid arthritis) ([facility] [address]     right ankle    RA    Weight gain      Family History   Family history unknown: Yes      Past Surgical History:   Procedure Laterality Date    ACHILLES TENDON SURGERY Right 2/20/2019    RIGHT ACHILLES TENDON LENGTHENING performed by Farhana Bojorquez MD at [facility] 80 Bilateral    [address] Right     CARPAL TUNNEL RELEASE Right 06/2010    COLONOSCOPY  11/2012    severe TICS Sigmoid and Pan Tic     FOOT NEUROMA SURGERY      bilat    KNEE ARTHROSCOPY      left    OTHER SURGICAL HISTORY      excision neck mass    GA TOTAL ANKLE REPLACEMENT Left 3/21/2018    LEFT TOTAL ANKLE REPLACEMENT LEFT FOOT CHEILECTOMY   ([facility]) performed by Farhana Bojorquez MD at [address] [facility] ARTHROSCOPY Right     TONSILLECTOMY      TOTAL ANKLE ARTHROPLASTY Right 2/20/2019    RIGHT ANKLE TOTAL ARTHROPLASTY performed by Farhana Bojorquez MD at [address] ENDOSCOPY  4/4/2016    UPPER GASTROINTESTINAL ENDOSCOPY  11/2012    repeated 02-13 and improved       Vitals:    06/21/22 1254   BP: 134/78   Temp: 98.3 °F (36.8 °C)   TempSrc: Oral   Weight: 184 lb (83.5 kg)       Objective:    Physical Exam  Vitals reviewed. Constitutional:       Appearance: She is well-developed. HENT:      Head: Normocephalic. Eyes:      Pupils: Pupils are equal, round, and reactive to light. Cardiovascular:      Rate and Rhythm: Normal rate and regular rhythm. Pulmonary:      Effort: Pulmonary effort is normal.      Breath sounds: Normal breath sounds. Abdominal:      Palpations: Abdomen is soft. Musculoskeletal:         General: Normal range of motion. Cervical back: Normal range of motion. Skin:     General: Skin is warm. Neurological:      Mental Status: She is alert and oriented to person, place, and time. Psychiatric:         Behavior: Behavior normal.         Yesi was seen today for other.     Diagnoses and all orders for this visit:    Rheumatoid arthritis involving multiple sites with positive rheumatoid factor ([facility] [address]    Non-seasonal allergic rhinitis due to other allergic trigger    Generalized osteoarthritis    Other fatigue    diet exe rhm issus   Lab    With    Me and rheum in 6 months      Diet exer lsoe wt  Offer sleep study and pt reufses  I educated the patient about all medications. Make sure they were correct and educated  on the risk associated with missing meds or taking them incorrectly. A list of medications is being sent home with patient today. Check blood pressure at home twice a day. Low-salt low caffeine diet. Call if systolic blood pressure is above 496 and diastolic blood pressures above 85. Only use a upper arm digital cuff. Aggressive low-fat diet. Avoid red meats, greasy fried foods, dairy products. Avoid processed foods. Take cholesterol medications without food. I informed patient about the risk associated with noncompliance of medication and taking medications incorrectly. Appropriate follow-up with myself and all specialist.  Encourage family members to take active role in assisting with medications and medical care. If any confusion should develop to notify my office immediately to avoid risk of worsening medical condition    A great deal of time spent reviewing medications, diet, exercise, social issues. Also reviewing the chart before entering the room with patient and finishing charting after leaving patient's room. More than half of that time was spent face to face with the patient in counseling and coordinating care. Comments: Follow Up: Return in about 6 months (around 12/21/2022) for Lab Before.      Seen by:  Ry Norwood DO

## 2022-08-25 ENCOUNTER — OFFICE VISIT (OUTPATIENT)
Dept: PRIMARY CARE CLINIC | Age: 62
End: 2022-08-25
Payer: MEDICARE

## 2022-08-25 VITALS
DIASTOLIC BLOOD PRESSURE: 75 MMHG | BODY MASS INDEX: 31.58 KG/M2 | TEMPERATURE: 98.1 F | SYSTOLIC BLOOD PRESSURE: 132 MMHG | WEIGHT: 185 LBS | HEIGHT: 64 IN

## 2022-08-25 DIAGNOSIS — M54.31 BILATERAL SCIATICA: Primary | ICD-10-CM

## 2022-08-25 DIAGNOSIS — M54.32 BILATERAL SCIATICA: Primary | ICD-10-CM

## 2022-08-25 DIAGNOSIS — M05.79 RHEUMATOID ARTHRITIS INVOLVING MULTIPLE SITES WITH POSITIVE RHEUMATOID FACTOR (HCC): Chronic | ICD-10-CM

## 2022-08-25 DIAGNOSIS — M51.16 HERNIATION OF LUMBAR INTERVERTEBRAL DISC WITH RADICULOPATHY: ICD-10-CM

## 2022-08-25 PROCEDURE — 96372 THER/PROPH/DIAG INJ SC/IM: CPT | Performed by: FAMILY MEDICINE

## 2022-08-25 PROCEDURE — 99213 OFFICE O/P EST LOW 20 MIN: CPT | Performed by: FAMILY MEDICINE

## 2022-08-25 RX ORDER — PREDNISONE 10 MG/1
TABLET ORAL
Qty: 18 TABLET | Refills: 0 | Status: SHIPPED
Start: 2022-08-25 | End: 2022-09-21

## 2022-08-25 RX ORDER — DEXAMETHASONE SODIUM PHOSPHATE 4 MG/ML
4 INJECTION, SOLUTION INTRA-ARTICULAR; INTRALESIONAL; INTRAMUSCULAR; INTRAVENOUS; SOFT TISSUE ONCE
Status: COMPLETED | OUTPATIENT
Start: 2022-08-25 | End: 2022-08-25

## 2022-08-25 RX ADMIN — DEXAMETHASONE SODIUM PHOSPHATE 4 MG: 4 INJECTION, SOLUTION INTRA-ARTICULAR; INTRALESIONAL; INTRAMUSCULAR; INTRAVENOUS; SOFT TISSUE at 14:58

## 2022-08-25 ASSESSMENT — PATIENT HEALTH QUESTIONNAIRE - PHQ9
SUM OF ALL RESPONSES TO PHQ9 QUESTIONS 1 & 2: 0
SUM OF ALL RESPONSES TO PHQ QUESTIONS 1-9: 0
SUM OF ALL RESPONSES TO PHQ QUESTIONS 1-9: 0
2. FEELING DOWN, DEPRESSED OR HOPELESS: 0
SUM OF ALL RESPONSES TO PHQ QUESTIONS 1-9: 0
1. LITTLE INTEREST OR PLEASURE IN DOING THINGS: 0
SUM OF ALL RESPONSES TO PHQ QUESTIONS 1-9: 0

## 2022-08-25 NOTE — PROGRESS NOTES
22  Name: Cori Escalona    : 1960    Sex: female    Age: 57 y.o. Subjective:  Chief Complaint: Patient is here for bilat  hip pain for  few weks      back pain   left leg    numb    Lw back pain after riding stationary   bike and felt left leg numb  for femin and past quickly  No cp ros ob   knees ok     Consatnt       walk and   steps   worse      Review of Systems   Constitutional: Negative. HENT: Negative. Eyes: Negative. Respiratory: Negative. Cardiovascular: Negative. Gastrointestinal: Negative. Endocrine: Negative. Genitourinary: Negative. Musculoskeletal:  Positive for arthralgias. Hpi   Skin: Negative. Allergic/Immunologic: Negative. Neurological: Negative. Hematological: Negative. Psychiatric/Behavioral: Negative.          Current Outpatient Medications:     predniSONE (DELTASONE) 10 MG tablet, ONE TID FOR THREE DAYS, ONE BID FOR THREE DAYS, ONE QD FOR THREE DAYS   FOOD, Disp: 18 tablet, Rfl: 0    naproxen (NAPROSYN) 500 MG tablet, Take 1 tablet by mouth 2 times daily (with meals), Disp: 60 tablet, Rfl: 5    FLUoxetine (PROZAC) 20 MG capsule, Take 20 mg by mouth 3 times daily, Disp: , Rfl:     zolpidem (AMBIEN) 5 MG tablet, Take 2.5 mg by mouth nightly as needed for Sleep., Disp: , Rfl:     calcium carbonate (OSCAL) 500 MG TABS tablet, Take 500 mg by mouth daily, Disp: , Rfl:     Multiple Vitamins-Minerals (CENTRUM ADULTS PO), Take by mouth daily, Disp: , Rfl:     Omega-3 1000 MG CAPS, Take by mouth daily, Disp: , Rfl:     Cholecalciferol (VITAMIN D) 50 MCG (2000 UT) CAPS capsule, Take by mouth daily, Disp: , Rfl:     vitamin E 1000 units capsule, Take 1,000 Units by mouth daily, Disp: , Rfl:     Turmeric 500 MG CAPS, Take by mouth daily, Disp: , Rfl:     Ascorbic Acid (VITAMIN C) 250 MG tablet, Take 250 mg by mouth daily, Disp: , Rfl:   Allergies   Allergen Reactions    Iodides     Percocet [Oxycodone-Acetaminophen] Nausea And Vomiting and Other (See Comments)     Felt like \"bugs crawling on skin\"    Percodan [Oxycodone-Aspirin] Nausea And Vomiting     Social History     Socioeconomic History    Marital status:      Spouse name: Not on file    Number of children: Not on file    Years of education: Not on file    Highest education level: Not on file   Occupational History    Not on file   Tobacco Use    Smoking status: Former     Packs/day: 0.25     Years: 10.00     Pack years: 2.50     Types: Cigarettes     Quit date: 2010     Years since quittin.8    Smokeless tobacco: Never   Vaping Use    Vaping Use: Never used   Substance and Sexual Activity    Alcohol use: Yes     Comment: social    Drug use: No    Sexual activity: Not on file   Other Topics Concern    Not on file   Social History Narrative        DEPRESSION. RIGHT ANKLE SURGERIES TIMES THREE WITH CHRONIC SEVERE PAIN DUE TO INJURY FROM    . FATIGUE. Yesi Jennings  1960 Page #2    WEIGHT GAIN. FAMILY HISTORY OF DIABETES. IRRITABLE BOWEL SYNDROME. DEATH OF A SON 2 YEARS AGO. PROBABLY CARPAL TUNNEL SYNDROME. LEFT POSTERIOR NECK MASS.     POS RA ---DR SHEARER-----[facility]---TOLD NO RA BUT ONLY FIBROMYALGIA--    CHANGED TO DR VEGA---PT REFUSING EMBREL    LOW B-12     SON IN [place]    FX L 10 11 12 RIBS    CTS OR 2010---RIGHT    COLON ---SEVERE TICS SIGMOID AND PAN TIC    EGD  GASTRITIS REPEATED IN  AND IMPROVED---    SMOKER QUIT AGE 46    EGD DR CHAUDHARI 3-16 AND SETTING UP PH STUDY    20 YR OL D SON SUICIDE  IN [place]-----PT SON HAS 5 KIDS    MOM  2017---AT AGE 78    TWO SONS--ONE -----OTHER MOVED FROM [place] TO [place] AND MOVED INTO [facility]---    LEFT ANKLE REPLACEMENT 3-21- DR Ibeth Fuchs----    RIGHT ANKLE REPLACEMENT  DR Ibeth Fuchs    Left shoulder OR  3-20  Dr Amado Cuevas    Left total knee   dr Meño Harkins with Willia Yadira cousin    Covid  vaccine   21   Brenda crookslv   dimer and [facility]   5-21 pre op clearance       Left  TKA  dr Phuong Klein    6-14-21    Right TKA   3-7-22     Bharathi Mendez son volunteering in [facility] to fight  6-22     Social Determinants of Health     Financial Resource Strain: Not on file   Food Insecurity: Not on file   Transportation Needs: Not on file   Physical Activity: Not on file   Stress: Not on file   Social Connections: Not on file   Intimate Partner Violence: Not on file   Housing Stability: Not on file      Past Medical History:   Diagnosis Date    Childhood asthma     Depression with anxiety     Diverticulitis     Fatigue     Fracture, ribs     10 11 12 ribs     History of stomach ulcers     IBS (irritable bowel syndrome)     Low vitamin B12 level     PTSD (post-traumatic stress disorder)     RA (rheumatoid arthritis) ([facility] [address]     right ankle    RA    Weight gain      Family History   Family history unknown: Yes      Past Surgical History:   Procedure Laterality Date    ACHILLES TENDON SURGERY Right 2/20/2019    RIGHT ACHILLES TENDON LENGTHENING performed by Zunilda Taylor MD at [address] [facility] Bilateral     [address] Right 06/2010    COLONOSCOPY  11/2012    severe TICS Sigmoid and Pan Tic     FOOT NEUROMA SURGERY      bilat    KNEE ARTHROSCOPY      left    OTHER SURGICAL HISTORY      excision neck mass    ME TOTAL ANKLE REPLACEMENT Left 3/21/2018    LEFT TOTAL ANKLE REPLACEMENT LEFT FOOT CHEILECTOMY   ([facility]) performed by Zunilda Taylor MD at [address] ARTHROSCOPY Right     TONSILLECTOMY      TOTAL ANKLE ARTHROPLASTY Right 2/20/2019    RIGHT ANKLE TOTAL ARTHROPLASTY performed by Zunilda Taylor MD at [address]  4/4/2016    UPPER GASTROINTESTINAL ENDOSCOPY  11/2012    repeated 02-13 and improved       Vitals:    08/25/22 1437   BP: 132/75   Temp: 98.1 °F (36.7 °C)   Weight: 185 lb (83.9 kg)   Height: 5' 4\" (1.626 m) Objective:    Physical Exam  Vitals reviewed. Constitutional:       Appearance: Normal appearance. She is well-developed. HENT:      Head: Normocephalic. Right Ear: Tympanic membrane normal.      Left Ear: Tympanic membrane normal.      Nose: Nose normal.      Mouth/Throat:      Mouth: Mucous membranes are moist.   Eyes:      Pupils: Pupils are equal, round, and reactive to light. Cardiovascular:      Rate and Rhythm: Normal rate and regular rhythm. Pulmonary:      Effort: Pulmonary effort is normal.      Breath sounds: Normal breath sounds. Abdominal:      General: Bowel sounds are normal.      Palpations: Abdomen is soft. Musculoskeletal:      Cervical back: Normal range of motion. Comments: decreased deep tendon reflexes left lower extremity. Ilat si tender witjh palp   para lubmar spasm   Skin:     General: Skin is warm. Neurological:      Mental Status: She is alert and oriented to person, place, and time. Psychiatric:         Behavior: Behavior normal.   X ray   pred  taper      Yesi was seen today for joint pain. Diagnoses and all orders for this visit:    Bilateral sciatica  -     XR LUMBAR SPINE (2-3 VIEWS); Future  -     XR HIP BILATERAL W AP PELVIS (2 VIEWS); Future    Herniation of lumbar intervertebral disc with radiculopathy  -     XR LUMBAR SPINE (2-3 VIEWS); Future  -     XR HIP BILATERAL W AP PELVIS (2 VIEWS); Future  -     predniSONE (DELTASONE) 10 MG tablet; ONE TID FOR THREE DAYS, ONE BID FOR THREE DAYS, ONE QD FOR THREE DAYS   FOOD  -     dexamethasone (DECADRON) injection 4 mg  -     MRI LUMBAR SPINE WO CONTRAST; Future    Rheumatoid arthritis involving multiple sites with positive rheumatoid factor (HCC)      Comments:  xray   pred taper not  great one wek call for mri    or referral   she likes dr Lanell Bosworth    I educated the patient about all medications.   Make sure they were correct and educated  on the risk associated with missing meds or taking them incorrectly. A list of medications is being sent home with patient today. Check blood pressure at home twice a day. Low-salt low caffeine diet. Call if systolic blood pressure is above 035 and diastolic blood pressures above 85. Only use a upper arm digital cuff. I informed patient about the risk associated with noncompliance of medication and taking medications incorrectly. Appropriate follow-up with myself and all specialist.  Encourage family members to take active role in assisting with medications and medical care. If any confusion should develop to notify my office immediately to avoid risk of worsening medical condition    A great deal of time spent reviewing medications, diet, exercise, social issues. Also reviewing the chart before entering the room with patient and finishing charting after leaving patient's room. More than half of that time was spent face to face with the patient in counseling and coordinating care. Estefany Alonso came back with severe degenerative disease of the low back. We will call the patient and order a MRI and have her see me after      Follow Up: Return for Reg Appt.      Seen by:  Sherrill Varela DO

## 2022-08-26 ENCOUNTER — TELEPHONE (OUTPATIENT)
Dept: PRIMARY CARE CLINIC | Age: 62
End: 2022-08-26

## 2022-09-14 ENCOUNTER — HOSPITAL ENCOUNTER (OUTPATIENT)
Dept: GENERAL RADIOLOGY | Age: 62
Discharge: HOME | End: 2022-09-16
Payer: MEDICARE

## 2022-09-14 DIAGNOSIS — M79.5 FOREIGN BODY (FB) IN SOFT TISSUE: ICD-10-CM

## 2022-09-14 PROCEDURE — 70030 X-RAY EYE FOR FOREIGN BODY: CPT

## 2022-09-15 ENCOUNTER — OFFICE VISIT (OUTPATIENT)
Dept: PRIMARY CARE CLINIC | Age: 62
End: 2022-09-15
Payer: MEDICARE

## 2022-09-15 VITALS
WEIGHT: 185 LBS | SYSTOLIC BLOOD PRESSURE: 135 MMHG | DIASTOLIC BLOOD PRESSURE: 82 MMHG | BODY MASS INDEX: 31.58 KG/M2 | OXYGEN SATURATION: 96 % | HEART RATE: 70 BPM | TEMPERATURE: 98.4 F | HEIGHT: 64 IN

## 2022-09-15 DIAGNOSIS — J01.80 ACUTE NON-RECURRENT SINUSITIS OF OTHER SINUS: ICD-10-CM

## 2022-09-15 DIAGNOSIS — M47.816 SPONDYLOSIS OF LUMBAR REGION WITHOUT MYELOPATHY OR RADICULOPATHY: ICD-10-CM

## 2022-09-15 DIAGNOSIS — M51.16 HERNIATION OF LUMBAR INTERVERTEBRAL DISC WITH RADICULOPATHY: Primary | ICD-10-CM

## 2022-09-15 DIAGNOSIS — J31.0 CHRONIC RHINITIS: ICD-10-CM

## 2022-09-15 PROCEDURE — 99214 OFFICE O/P EST MOD 30 MIN: CPT | Performed by: FAMILY MEDICINE

## 2022-09-15 PROCEDURE — 96372 THER/PROPH/DIAG INJ SC/IM: CPT | Performed by: FAMILY MEDICINE

## 2022-09-15 RX ORDER — PREDNISONE 10 MG/1
TABLET ORAL
Qty: 18 TABLET | Refills: 0 | Status: SHIPPED
Start: 2022-09-15 | End: 2022-09-21

## 2022-09-15 RX ORDER — METHYLPREDNISOLONE ACETATE 40 MG/ML
40 INJECTION, SUSPENSION INTRA-ARTICULAR; INTRALESIONAL; INTRAMUSCULAR; SOFT TISSUE ONCE
Status: COMPLETED | OUTPATIENT
Start: 2022-09-15 | End: 2022-09-15

## 2022-09-15 RX ORDER — CEPHALEXIN 500 MG/1
500 CAPSULE ORAL 3 TIMES DAILY
Qty: 21 CAPSULE | Refills: 0 | Status: SHIPPED
Start: 2022-09-15 | End: 2022-09-21

## 2022-09-15 RX ADMIN — METHYLPREDNISOLONE ACETATE 40 MG: 40 INJECTION, SUSPENSION INTRA-ARTICULAR; INTRALESIONAL; INTRAMUSCULAR; SOFT TISSUE at 15:23

## 2022-09-15 ASSESSMENT — ENCOUNTER SYMPTOMS
ALLERGIC/IMMUNOLOGIC NEGATIVE: 1
RESPIRATORY NEGATIVE: 1
EYES NEGATIVE: 1
GASTROINTESTINAL NEGATIVE: 1
BACK PAIN: 1

## 2022-09-15 NOTE — PROGRESS NOTES
9/15/22  Name: Jose Heck    : 1960    Sex: female    Age: 57 y.o. Subjective:  Chief Complaint: Patient is here for back pain   leg pain sinue   driagne  sore throat     Lef tlwo back pain and left  leg pain  left leg numb off and on    Left lwo  back pain sl lfare   she wnet for mri but found out  ins did nt approve it yet so she left      awiting word  Sinus libbys    betzaida      Review of Systems   Constitutional: Negative. HENT: Negative. Eyes: Negative. Respiratory: Negative. Cardiovascular: Negative. Gastrointestinal: Negative. Endocrine: Negative. Genitourinary: Negative. Musculoskeletal:  Positive for arthralgias and back pain. Skin: Negative. Allergic/Immunologic: Negative. Neurological: Negative. Hematological: Negative. Psychiatric/Behavioral: Negative.          Current Outpatient Medications:     predniSONE (DELTASONE) 10 MG tablet, ONE TID FOR THREE DAYS, ONE BID FOR THREE DAYS, ONE QD FOR THREE DAYS   FOOD, Disp: 18 tablet, Rfl: 0    cephALEXin (KEFLEX) 500 MG capsule, Take 1 capsule by mouth 3 times daily, Disp: 21 capsule, Rfl: 0    predniSONE (DELTASONE) 10 MG tablet, ONE TID FOR THREE DAYS, ONE BID FOR THREE DAYS, ONE QD FOR THREE DAYS   FOOD, Disp: 18 tablet, Rfl: 0    naproxen (NAPROSYN) 500 MG tablet, Take 1 tablet by mouth 2 times daily (with meals), Disp: 60 tablet, Rfl: 5    FLUoxetine (PROZAC) 20 MG capsule, Take 20 mg by mouth 3 times daily, Disp: , Rfl:     zolpidem (AMBIEN) 5 MG tablet, Take 2.5 mg by mouth nightly as needed for Sleep., Disp: , Rfl:     calcium carbonate (OSCAL) 500 MG TABS tablet, Take 500 mg by mouth daily, Disp: , Rfl:     Multiple Vitamins-Minerals (CENTRUM ADULTS PO), Take by mouth daily, Disp: , Rfl:     Omega-3 1000 MG CAPS, Take by mouth daily, Disp: , Rfl:     Cholecalciferol (VITAMIN D) 50 MCG (2000) CAPS capsule, Take by mouth daily, Disp: , Rfl:     vitamin E 1000 units capsule, Take 1,000 Units by mouth daily, Disp: , Rfl:     Turmeric 500 MG CAPS, Take by mouth daily, Disp: , Rfl:     Ascorbic Acid (VITAMIN C) 250 MG tablet, Take 250 mg by mouth daily, Disp: , Rfl:   Allergies   Allergen Reactions    Iodides     Percocet [Oxycodone-Acetaminophen] Nausea And Vomiting and Other (See Comments)     Felt like \"bugs crawling on skin\"    Percodan [Oxycodone-Aspirin] Nausea And Vomiting     Social History     Socioeconomic History    Marital status:      Spouse name: Not on file    Number of children: Not on file    Years of education: Not on file    Highest education level: Not on file   Occupational History    Not on file   Tobacco Use    Smoking status: Former     Packs/day: 0.25     Years: 10.00     Pack years: 2.50     Types: Cigarettes     Quit date: 2010     Years since quittin.8    Smokeless tobacco: Never   Vaping Use    Vaping Use: Never used   Substance and Sexual Activity    Alcohol use: Yes     Comment: social    Drug use: No    Sexual activity: Not on file   Other Topics Concern    Not on file   Social History Narrative        DEPRESSION. RIGHT ANKLE SURGERIES TIMES THREE WITH CHRONIC SEVERE PAIN DUE TO INJURY FROM    . FATIGUE. Yesi EZE WhitlockDick  1960 Page #2    WEIGHT GAIN. FAMILY HISTORY OF DIABETES. IRRITABLE BOWEL SYNDROME. DEATH OF A SON 2 YEARS AGO. PROBABLY CARPAL TUNNEL SYNDROME. LEFT POSTERIOR NECK MASS.     POS RA ---DR SHEARER-----[facility]---TOLD NO RA BUT ONLY FIBROMYALGIA--    CHANGED TO DR VEGA---PT REFUSING CHRISTIAN    [facility] -12     SON IN [place]    FX L 10 11 12 RIBS    CTS OR 2010---RIGHT    COLON ---SEVERE TICS SIGMOID AND PAN TIC    EGD  GASTRITIS REPEATED IN  AND IMPROVED---    SMOKER QUIT AGE 46    EGD DR CHAUDHARI 3-16 AND SETTING UP PH STUDY    20 YR OL D SON SUICIDE  IN [place]-----PT SON HAS 5 KIDS    MOM  2017---AT AGE 80    TWO SONS--ONE -----OTHER MOVED FROM [place] TO [facility] AND MOVED INTO [facility]---    LEFT ANKLE REPLACEMENT 3-21-18 DR Ibeth Fuchs----    RIGHT ANKLE REPLACEMENT 2-19 DR Ibeth Fuchs    Left shoulder OR  3-20  Dr Amado Cuevas    Left total knee   dr Meño Harkins with Андрей May cousin    Covid  vaccine   4-8-21   Brenda crookslv   dimer [facility]   5-21 pre op clearance       Left  TKA  dr Jo Richard    6-14-21    Right TKA   3-7-22     Niraj Vallecillo son volunteering in [facility] to fight  6-22    Low back pain and left leg pain     9-22 with severe arth  L-4--5---L-5--S-1---order mri and dr Domitila Anaya    Uses marijuana  vaping   9-22     Social Determinants of Health     Financial Resource Strain: Not on file   Food Insecurity: Not on file   Transportation Needs: Not on file   Physical Activity: Not on file   Stress: Not on file   Social Connections: Not on file   Intimate Partner Violence: Not on file   Housing Stability: Not on file      Past Medical History:   Diagnosis Date    Childhood asthma     Depression with anxiety     Diverticulitis     Fatigue     Fracture, ribs     10 11 12 ribs     History of stomach ulcers     IBS (irritable bowel syndrome)     Low vitamin B12 level     PTSD (post-traumatic stress disorder)     RA (rheumatoid arthritis) ([facility] [address]     right ankle    RA    Weight gain      Family History   Family history unknown: Yes      Past Surgical History:   Procedure Laterality Date    ACHILLES TENDON SURGERY Right 2/20/2019    RIGHT ACHILLES TENDON LENGTHENING performed by Janell Zamorano MD at [address] Bilateral     CARPAL TUNNEL RELEASE Right     CARPAL TUNNEL RELEASE Right 06/2010    COLONOSCOPY  11/2012    severe TICS Sigmoid and Pan Tic     FOOT NEUROMA SURGERY      bilat    KNEE ARTHROSCOPY      left    OTHER SURGICAL HISTORY      excision neck mass    MI TOTAL ANKLE REPLACEMENT Left 3/21/2018    LEFT TOTAL ANKLE REPLACEMENT LEFT FOOT [facility]OMY   [facility] performed by Janell Zamorano MD at [address] ARTHROSCOPY Right     TONSILLECTOMY      TOTAL ANKLE ARTHROPLASTY Right 2/20/2019    RIGHT ANKLE TOTAL ARTHROPLASTY performed by Caity Lawton MD at [address]  4/4/2016    UPPER GASTROINTESTINAL ENDOSCOPY  11/2012    repeated 02-13 and improved       Vitals:    09/15/22 1444   BP: 135/82   Pulse: 70   Temp: 98.4 °F (36.9 °C)   SpO2: 96%   Weight: 185 lb (83.9 kg)   Height: 5' 4\" (1.626 m)       Objective:    Physical Exam  Vitals reviewed. Constitutional:       Appearance: Normal appearance. She is well-developed. HENT:      Head: Normocephalic. Right Ear: Tympanic membrane normal.      Left Ear: Tympanic membrane normal.      Nose: Nose normal.      Mouth/Throat:      Mouth: Mucous membranes are moist.   Eyes:      Pupils: Pupils are equal, round, and reactive to light. Cardiovascular:      Rate and Rhythm: Normal rate and regular rhythm. Pulmonary:      Effort: Pulmonary effort is normal.      Breath sounds: Normal breath sounds. Abdominal:      General: Bowel sounds are normal.      Palpations: Abdomen is soft. Musculoskeletal:      Cervical back: Normal range of motion. Comments: Eft para lubmar spasm with    pain with hip flexion   Skin:     General: Skin is warm. Neurological:      Mental Status: She is alert and oriented to person, place, and time. Psychiatric:         Behavior: Behavior normal.       Yesi was seen today for back pain and sinus problem.     Diagnoses and all orders for this visit:    Herniation of lumbar intervertebral disc with radiculopathy  -     External Referral To Pain Clinic  -     methylPREDNISolone acetate (DEPO-MEDROL) injection 40 mg  -     predniSONE (DELTASONE) 10 MG tablet; ONE TID FOR THREE DAYS, ONE BID FOR THREE DAYS, ONE QD FOR THREE DAYS   FOOD    Spondylosis of lumbar region without myelopathy or radiculopathy  -     External Referral To Pain Clinic  -     methylPREDNISolone acetate

## 2022-09-20 ENCOUNTER — HOSPITAL ENCOUNTER (OUTPATIENT)
Dept: MRI IMAGING | Age: 62
Discharge: HOME | End: 2022-09-22
Payer: MEDICARE

## 2022-09-20 PROCEDURE — 72148 MRI LUMBAR SPINE W/O DYE: CPT

## 2022-09-21 ENCOUNTER — OFFICE VISIT (OUTPATIENT)
Dept: PRIMARY CARE CLINIC | Age: 62
End: 2022-09-21
Payer: MEDICARE

## 2022-09-21 ENCOUNTER — TELEPHONE (OUTPATIENT)
Dept: PRIMARY CARE CLINIC | Age: 62
End: 2022-09-21

## 2022-09-21 VITALS
HEIGHT: 64 IN | DIASTOLIC BLOOD PRESSURE: 82 MMHG | WEIGHT: 184 LBS | SYSTOLIC BLOOD PRESSURE: 134 MMHG | TEMPERATURE: 97.8 F | BODY MASS INDEX: 31.41 KG/M2

## 2022-09-21 DIAGNOSIS — M47.816 SPONDYLOSIS OF LUMBAR REGION WITHOUT MYELOPATHY OR RADICULOPATHY: ICD-10-CM

## 2022-09-21 DIAGNOSIS — M51.16 HERNIATION OF LUMBAR INTERVERTEBRAL DISC WITH RADICULOPATHY: Primary | ICD-10-CM

## 2022-09-21 PROCEDURE — 99213 OFFICE O/P EST LOW 20 MIN: CPT | Performed by: FAMILY MEDICINE

## 2022-09-21 ASSESSMENT — ENCOUNTER SYMPTOMS
RESPIRATORY NEGATIVE: 1
EYES NEGATIVE: 1
ALLERGIC/IMMUNOLOGIC NEGATIVE: 1
GASTROINTESTINAL NEGATIVE: 1
BACK PAIN: 1

## 2022-09-21 NOTE — PROGRESS NOTES
Not on file    Highest education level: Not on file   Occupational History    Not on file   Tobacco Use    Smoking status: Former     Packs/day: 0.25     Years: 10.00     Pack years: 2.50     Types: Cigarettes     Quit date: 2010     Years since quittin.8    Smokeless tobacco: Never   Vaping Use    Vaping Use: Never used   Substance and Sexual Activity    Alcohol use: Yes     Comment: social    Drug use: No    Sexual activity: Not on file   Other Topics Concern    Not on file   Social History Narrative        DEPRESSION. RIGHT ANKLE SURGERIES TIMES THREE WITH CHRONIC SEVERE PAIN DUE TO INJURY FROM    . FATIGUE. Yesi Jennings  1960 Page #2    WEIGHT GAIN. FAMILY HISTORY OF DIABETES. IRRITABLE BOWEL SYNDROME. DEATH OF A SON 2 YEARS AGO. PROBABLY CARPAL TUNNEL SYNDROME. LEFT POSTERIOR NECK MASS.     POS RA ---DR SHEARER-----[facility]---TOLD NO RA BUT ONLY FIBROMYALGIA--    CHANGED TO DR VEGA---PT REFUSING [facility] -    SON IN [place]    FX L 10 11 12 RIBS    CTS OR 2010---RIGHT    COLON ---SEVERE TICS SIGMOID AND PAN TIC    EGD  GASTRITIS REPEATED IN  AND IMPROVED---    SMOKER QUIT AGE 46    EGD DR CHAUDHARI 3-16 AND SETTING UP PH STUDY    20 YR OL D SON SUICIDE  IN [place]-----PT SON HAS 5 KIDS    MOM  2017---AT AGE 78    TWO SONS--ONE -----OTHER [place]ED [place] TO [place] AND MOVED INTO [facility]---    LEFT ANKLE REPLACEMENT 3-21-18 DR Wojciech Barreto----    RIGHT ANKLE REPLACEMENT  DR Wojciech Barreto    Left shoulder OR  3-20  Dr Clyde Tran    Left total knee   dr Errol Osler with Keyona Dayami cousin    Covid  vaccine   21   Odinoson   eelv   dimer and [facility]    pre op clearance       Left  TKA  dr Calero Males    21    Right TKA   3-7-22   dr Aurea Bills son volunteering in [facility] to fight      Low back pain and left leg pain      with severe arth  L-4--5---L-5--S-1---order mri and  yarab-----listhesis  L--4---L-5    Uses marijuana  vaping   9-22     Social Determinants of Health     Financial Resource Strain: Not on file   Food Insecurity: Not on file   Transportation Needs: Not on file   Physical Activity: Not on file   Stress: Not on file   Social Connections: Not on file   Intimate Partner Violence: Not on file   Housing Stability: Not on file      Past Medical History:   Diagnosis Date    Childhood asthma     Depression with anxiety     Diverticulitis     Fatigue     Fracture, ribs     10 11 12 ribs     History of stomach ulcers     IBS (irritable bowel syndrome)     Low vitamin B12 level     PTSD (post-traumatic stress disorder)     RA (rheumatoid arthritis) ([facility] [address]     right ankle    RA    Weight gain      Family History   Family history unknown: Yes      Past Surgical History:   Procedure Laterality Date    ACHILLES TENDON SURGERY Right 2/20/2019    RIGHT ACHILLES TENDON LENGTHENING [facility]     CARPAL TUNNEL RELEASE Right     CARPAL TUNNEL RELEASE Right 06/2010    COLONOSCOPY  11/2012    severe TICS Sigmoid and Pan Tic     FOOT NEUROMA SURGERY      bilat    KNEE ARTHROSCOPY      left    OTHER SURGICAL HISTORY      excision neck mass    VT TOTAL ANKLE REPLACEMENT Left 3/21/2018    LEFT TOTAL ANKLE REPLACEMENT LEFT FOOT CHEILECTOMY   ([facility]) performed by Jenn Hankins MD at [address] ARTHROSCOPY Right     TONSILLECTOMY      TOTAL ANKLE ARTHROPLASTY Right 2/20/2019    RIGHT ANKLE TOTAL ARTHROPLASTY performed by Jenn Hankins MD at [address]  4/4/2016    UPPER GASTROINTESTINAL ENDOSCOPY  11/2012    repeated 02-13 and improved       Vitals:    09/21/22 1401   BP: 134/82   Temp: 97.8 °F (36.6 °C)   TempSrc: Oral   Weight: 184 lb (83.5 kg)   Height: 5' 4\" (1.626 m)       Objective:    Physical Exam  Vitals reviewed.    Constitutional: office immediately to avoid risk of worsening medical condition      A great deal of time spent reviewing medications, diet, exercise, social issues. Also reviewing the chart before entering the room with patient and finishing charting after leaving patient's room. More than half of that time was spent face to face with the patient in counseling and coordinating care. Follow Up: Return for Reg Appt.      Seen by:  Samia Gallegos DO

## 2022-09-30 ENCOUNTER — HOSPITAL ENCOUNTER (OUTPATIENT)
Dept: MRI IMAGING | Age: 62
Discharge: HOME | End: 2022-09-16
Payer: MEDICARE

## 2022-09-30 DIAGNOSIS — M51.16 HERNIATION OF LUMBAR INTERVERTEBRAL DISC WITH RADICULOPATHY: ICD-10-CM

## 2022-10-14 ENCOUNTER — OFFICE VISIT (OUTPATIENT)
Dept: PRIMARY CARE CLINIC | Age: 62
End: 2022-10-14
Payer: MEDICARE

## 2022-10-14 DIAGNOSIS — J01.80 ACUTE NON-RECURRENT SINUSITIS OF OTHER SINUS: ICD-10-CM

## 2022-10-14 DIAGNOSIS — M15.9 PRIMARY OSTEOARTHRITIS INVOLVING MULTIPLE JOINTS: ICD-10-CM

## 2022-10-14 DIAGNOSIS — J31.0 CHRONIC RHINITIS: ICD-10-CM

## 2022-10-14 DIAGNOSIS — N39.46 MIXED STRESS AND URGE URINARY INCONTINENCE: Primary | Chronic | ICD-10-CM

## 2022-10-14 DIAGNOSIS — M05.79 RHEUMATOID ARTHRITIS INVOLVING MULTIPLE SITES WITH POSITIVE RHEUMATOID FACTOR (HCC): Chronic | ICD-10-CM

## 2022-10-14 PROCEDURE — 99214 OFFICE O/P EST MOD 30 MIN: CPT | Performed by: FAMILY MEDICINE

## 2022-10-14 RX ORDER — AMOXICILLIN 500 MG/1
500 CAPSULE ORAL 3 TIMES DAILY
Qty: 21 CAPSULE | Refills: 0 | Status: SHIPPED | OUTPATIENT
Start: 2022-10-14 | End: 2022-10-21

## 2022-10-14 NOTE — PROGRESS NOTES
10/14/22  Name: Brian Chapa    : 1960    Sex: female    Age: 57 y.o. Subjective:  Chief Complaint: Patient is here for  back and  bilat  hip pain  sinus driange cough  urine incont    Saw dr Cl Horn   and   for  poss   shtos son  for  emg soon  Cough siuns mucus  three d  Goes tues for nerve  study  16   yrs today  since  son     Urine incontinence is worse. She is willing to go back to see the GYN neurologist again. Review of Systems   Constitutional: Negative. HENT:  Positive for congestion (nasal congestion) and postnasal drip. Negative for sore throat and trouble swallowing. Eyes: Negative. Respiratory: Negative. Cardiovascular: Negative. Gastrointestinal: Negative. Endocrine: Negative. Genitourinary: Negative. Musculoskeletal:  Positive for back pain. Skin: Negative. Allergic/Immunologic: Negative. Neurological: Negative. Hematological: Negative. Psychiatric/Behavioral: Negative.          Current Outpatient Medications:     amoxicillin (AMOXIL) 500 MG capsule, Take 1 capsule by mouth 3 times daily for 7 days, Disp: 21 capsule, Rfl: 0    naproxen (NAPROSYN) 500 MG tablet, Take 1 tablet by mouth 2 times daily (with meals), Disp: 60 tablet, Rfl: 5    FLUoxetine (PROZAC) 20 MG capsule, Take 20 mg by mouth 3 times daily, Disp: , Rfl:     zolpidem (AMBIEN) 5 MG tablet, Take 2.5 mg by mouth nightly as needed for Sleep., Disp: , Rfl:     calcium carbonate (OSCAL) 500 MG TABS tablet, Take 500 mg by mouth daily, Disp: , Rfl:     Multiple Vitamins-Minerals (CENTRUM ADULTS PO), Take by mouth daily, Disp: , Rfl:     Omega-3 1000 MG CAPS, Take by mouth daily, Disp: , Rfl:     Cholecalciferol (VITAMIN D) 50 MCG (2000 UT) CAPS capsule, Take by mouth daily, Disp: , Rfl:     vitamin E 1000 units capsule, Take 1,000 Units by mouth daily, Disp: , Rfl:     Turmeric 500 MG CAPS, Take by mouth daily, Disp: , Rfl:     Ascorbic Acid (VITAMIN C) 250 MG tablet, Take 250 mg by mouth daily, Disp: , Rfl:   Allergies   Allergen Reactions    Iodides     Percocet [Oxycodone-Acetaminophen] Nausea And Vomiting and Other (See Comments)     Felt like \"bugs crawling on skin\"    Percodan [Oxycodone-Aspirin] Nausea And Vomiting     Social History     Socioeconomic History    Marital status:      Spouse name: Not on file    Number of children: Not on file    Years of education: Not on file    Highest education level: Not on file   Occupational History    Not on file   Tobacco Use    Smoking status: Former     Packs/day: 0.25     Years: 10.00     Pack years: 2.50     Types: Cigarettes     Quit date: 2010     Years since quittin.9    Smokeless tobacco: Never   Vaping Use    Vaping Use: Never used   Substance and Sexual Activity    Alcohol use: Yes     Comment: social    Drug use: No    Sexual activity: Not on file   Other Topics Concern    Not on file   Social History Narrative        DEPRESSION. RIGHT ANKLE SURGERIES TIMES THREE WITH CHRONIC SEVERE PAIN DUE TO INJURY FROM    . FATIGUE. Yesi EZE Romoon  1960 Page #2    WEIGHT GAIN. FAMILY HISTORY OF DIABETES. IRRITABLE BOWEL SYNDROME. DEATH OF A SON 2 YEARS AGO. PROBABLY CARPAL TUNNEL SYNDROME. LEFT POSTERIOR NECK MASS.     POS RA ---DR SHEARER-----[facility]---TOLD NO RA BUT ONLY FIBROMYALGIA--    CHANGED TO DR VEGA---PT REFUSING EMBREL    LOW -    SON IN [place]    FX L 10 11 12 RIBS    CTS OR 2010---RIGHT    COLON ---SEVERE TICS SIGMOID AND PAN TIC    EGD  GASTRITIS REPEATED IN  AND IMPROVED---    SMOKER QUIT AGE 46    EGD DR CHAUDHARI 3-16 AND SETTING UP PH STUDY    20 YR OL D SON SUICIDE  IN [place]------had one kaleigh and pt not see   in town------------PT SON HAS 5 KIDS---pt hardly see    MOM  ---AT AGE 78    TWO SONS--ONE -----OTHER MOVED FROM [place] TO [place] AND MOVED INTO [facility]---    LEFT ANKLE REPLACEMENT 3-21-18 DR Sahu Kidney---- RIGHT ANKLE REPLACEMENT 2-19 DR Sofía rosenbaum  for urine incont     started  est  vag    craem weekly   2-2020    Left shoulder OR  3-20  Dr Niecy Cruz    Left total knee   dr Keiry Woodward with Edilson Pin cousin    Covid  vaccine   4-8-21   Odinoson   eelv   dimer and [facility] to [facility]   5-21 pre op clearance       Left  TKA  dr Jesse Moseley    6-14-21    Right TKA   3-7-22     Divyasandeepabdifatah Sultana son volunteering in [facility] to fight  6-22    Low back pain and left leg pain     9-22 with severe arth  L-4--5---L-5--S-1---order mri and dr jackson-----listhesis  L--4---L-5    Uses marijuana  vaping   9-22     Social Determinants of Health     Financial Resource Strain: Not on file   Food Insecurity: Not on file   Transportation Needs: Not on file   Physical Activity: Not on file   Stress: Not on file   Social Connections: Not on file   Intimate Partner Violence: Not on file   Housing Stability: Not on file      Past Medical History:   Diagnosis Date    Childhood asthma     Depression with anxiety     Diverticulitis     Fatigue     Fracture, ribs     10 11 12 ribs     History of stomach ulcers     IBS (irritable bowel syndrome)     Low vitamin B12 level     PTSD (post-traumatic stress disorder)     RA (rheumatoid arthritis) ([facility] [address]     right ankle    RA    Weight gain      Family History   Family history unknown: Yes      Past Surgical History:   Procedure Laterality Date    ACHILLES TENDON SURGERY Right 2/20/2019    RIGHT ACHILLES TENDON LENGTHENING performed by Lorrane Claude, MD at [address] [facility] Bilateral     CARPAL TUNNEL RELEASE Right     CARPAL TUNNEL RELEASE Right 06/2010    COLONOSCOPY  11/2012    severe TICS Sigmoid and Pan Tic     FOOT NEUROMA SURGERY      bilat    KNEE ARTHROSCOPY      left    OTHER SURGICAL HISTORY      excision neck mass    [place] TOTAL ANKLE REPLACEMENT Left 3/21/2018    LEFT TOTAL ANKLE REPLACEMENT LEFT FOOT CHEILECTOMY   ([facility]) performed by Gaston Pulido MD [place] at [address] ARTHROSCOPY Right     TONSILLECTOMY      TOTAL ANKLE ARTHROPLASTY Right 2/20/2019    RIGHT ANKLE TOTAL ARTHROPLASTY performed by Leyda Smith MD at [facility] ENDOSCOPY  4/4/2016    UPPER GASTROINTESTINAL ENDOSCOPY  11/2012    repeated 02-13 and improved       Vitals:    10/14/22 1435   BP: 134/82   Pulse: 65   Resp: 20   Temp: 97.4 °F (36.3 °C)   TempSrc: Temporal   SpO2: 98%   Weight: 184 lb (83.5 kg)   Height: 5' 4\" (1.626 m)       Objective:    Physical Exam  Vitals reviewed. Constitutional:       Appearance: Normal appearance. She is well-developed. HENT:      Head: Normocephalic. Right Ear: Tympanic membrane normal.      Left Ear: Tympanic membrane normal.      Nose: Nose normal.      Mouth/Throat:      Mouth: Mucous membranes are moist.   Eyes:      Pupils: Pupils are equal, round, and reactive to light. Cardiovascular:      Rate and Rhythm: Normal rate and regular rhythm. Pulmonary:      Effort: Pulmonary effort is normal.      Breath sounds: Normal breath sounds. Abdominal:      General: Bowel sounds are normal.      Palpations: Abdomen is soft. Musculoskeletal:         General: Normal range of motion. Cervical back: Normal range of motion. Skin:     General: Skin is warm. Neurological:      Mental Status: She is alert and oriented to person, place, and time. Psychiatric:         Behavior: Behavior normal.       Yesi was seen today for hip pain and pharyngitis. Diagnoses and all orders for this visit:    Mixed stress and urge urinary incontinence  -     External Referral To Urology    Rheumatoid arthritis involving multiple sites with positive rheumatoid factor (HCC)    Chronic rhinitis    Primary osteoarthritis involving multiple joints    Acute non-recurrent sinusitis of other sinus    Other orders  -     amoxicillin (AMOXIL) 500 MG capsule;  Take 1 capsule by mouth 3 times daily for 7 days      Comments: Range of motion exercise taught for arthritis. Antibiotic for sinus. If not better notify. Range of motion exercises taught for arthritis and back pain. She will be getting another shot soon with physical medicine. Appointment GYN-urology. Lose weight exercise. Offered counseling but declines. I educated the patient about all medications. Make sure they were correct and educated  on the risk associated with missing meds or taking them incorrectly. A list of medications is being sent home with patient today. Aggressive low-fat diet. Avoid red meats, greasy fried foods, dairy products. Avoid processed foods. Take cholesterol medications without food. I informed patient about the risk associated with noncompliance of medication and taking medications incorrectly. Appropriate follow-up with myself and all specialist.  Encourage family members to take active role in assisting with medications and medical care. If any confusion should develop to notify my office immediately to avoid risk of worsening medical condition      A great deal of time spent reviewing medications, diet, exercise, social issues. Also reviewing the chart before entering the room with patient and finishing charting after leaving patient's room. More than half of that time was spent face to face with the patient in counseling and coordinating care.         Follow Up: Return for Reg Appt, See Referral.     Seen by:  Toshia Lopez,

## 2022-10-15 VITALS
HEIGHT: 64 IN | HEART RATE: 65 BPM | TEMPERATURE: 97.4 F | BODY MASS INDEX: 31.41 KG/M2 | RESPIRATION RATE: 20 BRPM | WEIGHT: 184 LBS | SYSTOLIC BLOOD PRESSURE: 134 MMHG | DIASTOLIC BLOOD PRESSURE: 82 MMHG | OXYGEN SATURATION: 98 %

## 2022-10-15 PROBLEM — M15.0 PRIMARY OSTEOARTHRITIS INVOLVING MULTIPLE JOINTS: Status: ACTIVE | Noted: 2021-11-16

## 2022-10-15 PROBLEM — J01.90 ACUTE INFECTION OF NASAL SINUS: Status: ACTIVE | Noted: 2022-10-15

## 2022-10-15 ASSESSMENT — PATIENT HEALTH QUESTIONNAIRE - PHQ9
SUM OF ALL RESPONSES TO PHQ QUESTIONS 1-9: 0
2. FEELING DOWN, DEPRESSED OR HOPELESS: 0
1. LITTLE INTEREST OR PLEASURE IN DOING THINGS: 0
SUM OF ALL RESPONSES TO PHQ9 QUESTIONS 1 & 2: 0

## 2022-10-15 ASSESSMENT — ENCOUNTER SYMPTOMS
BACK PAIN: 1
TROUBLE SWALLOWING: 0
GASTROINTESTINAL NEGATIVE: 1
RESPIRATORY NEGATIVE: 1
ALLERGIC/IMMUNOLOGIC NEGATIVE: 1
EYES NEGATIVE: 1
SORE THROAT: 0

## 2022-11-02 ENCOUNTER — OFFICE VISIT (OUTPATIENT)
Dept: FAMILY MEDICINE CLINIC | Age: 62
End: 2022-11-02
Payer: MEDICARE

## 2022-11-02 VITALS
WEIGHT: 187.8 LBS | OXYGEN SATURATION: 96 % | BODY MASS INDEX: 32.06 KG/M2 | SYSTOLIC BLOOD PRESSURE: 134 MMHG | HEIGHT: 64 IN | HEART RATE: 94 BPM | DIASTOLIC BLOOD PRESSURE: 82 MMHG | TEMPERATURE: 97.6 F

## 2022-11-02 DIAGNOSIS — R05.9 COUGH IN ADULT: ICD-10-CM

## 2022-11-02 DIAGNOSIS — N39.46 MIXED STRESS AND URGE URINARY INCONTINENCE: ICD-10-CM

## 2022-11-02 DIAGNOSIS — M05.79 RHEUMATOID ARTHRITIS INVOLVING MULTIPLE SITES WITH POSITIVE RHEUMATOID FACTOR (HCC): Chronic | ICD-10-CM

## 2022-11-02 DIAGNOSIS — M51.16 HERNIATION OF LUMBAR INTERVERTEBRAL DISC WITH RADICULOPATHY: Primary | ICD-10-CM

## 2022-11-02 DIAGNOSIS — K21.9 GASTROESOPHAGEAL REFLUX DISEASE WITHOUT ESOPHAGITIS: ICD-10-CM

## 2022-11-02 LAB
Lab: NORMAL
PERFORMING INSTRUMENT: NORMAL
QC PASS/FAIL: NORMAL
SARS-COV-2, POC: NORMAL

## 2022-11-02 PROCEDURE — 99214 OFFICE O/P EST MOD 30 MIN: CPT | Performed by: FAMILY MEDICINE

## 2022-11-02 PROCEDURE — 87426 SARSCOV CORONAVIRUS AG IA: CPT | Performed by: FAMILY MEDICINE

## 2022-11-02 RX ORDER — GABAPENTIN 100 MG/1
100 CAPSULE ORAL NIGHTLY
Qty: 30 CAPSULE | Refills: 0
Start: 2022-11-02 | End: 2022-12-02

## 2022-11-02 ASSESSMENT — ENCOUNTER SYMPTOMS
BACK PAIN: 1
RESPIRATORY NEGATIVE: 1
GASTROINTESTINAL NEGATIVE: 1
ALLERGIC/IMMUNOLOGIC NEGATIVE: 1
EYES NEGATIVE: 1

## 2022-11-02 NOTE — PROGRESS NOTES
22  Name: Lyndsey Becker    : 1960    Sex: female    Age: 57 y.o. Subjective:  Chief Complaint: Patient is here for  lwo back pain  epi burning and  mid  abd pain   urine incont      Saw  read  and emg done and hard to read  we art get  report again   she says  dr Kiley Zhou  twice an  says cant nto pull up mri images   I told pt to get mri form  mri dept---  She had one epidural and not help  Sees  jacek pa tomorrow  Bladder leaks with   cough or laugh  --offer back to apoustouis and pt  ref      Review of Systems   Constitutional: Negative. HENT: Negative. Eyes: Negative. Respiratory: Negative. Cardiovascular: Negative. Gastrointestinal: Negative. Endocrine: Negative. Genitourinary:  Positive for frequency. Musculoskeletal:  Positive for back pain. Skin: Negative. Allergic/Immunologic: Negative. Neurological: Negative. Hematological: Negative. Psychiatric/Behavioral: Negative. Current Outpatient Medications:     gabapentin (NEURONTIN) 100 MG capsule, Take 1 capsule by mouth nightly for 30 days.  Intended supply: 30 days, Disp: 30 capsule, Rfl: 0    FLUoxetine (PROZAC) 20 MG capsule, Take 20 mg by mouth 3 times daily, Disp: , Rfl:     zolpidem (AMBIEN) 5 MG tablet, Take 2.5 mg by mouth nightly as needed for Sleep., Disp: , Rfl:     calcium carbonate (OSCAL) 500 MG TABS tablet, Take 500 mg by mouth daily, Disp: , Rfl:     Multiple Vitamins-Minerals (CENTRUM ADULTS PO), Take by mouth daily, Disp: , Rfl:     Omega-3 1000 MG CAPS, Take by mouth daily, Disp: , Rfl:     Cholecalciferol (VITAMIN D) 50 MCG ( UT) CAPS capsule, Take by mouth daily, Disp: , Rfl:     vitamin E 1000 units capsule, Take 1,000 Units by mouth daily, Disp: , Rfl:     Turmeric 500 MG CAPS, Take by mouth daily, Disp: , Rfl:     Ascorbic Acid (VITAMIN C) 250 MG tablet, Take 250 mg by mouth daily, Disp: , Rfl:   Allergies   Allergen Reactions    Iodides     Percocet [Oxycodone-Acetaminophen] Nausea And Vomiting and Other (See Comments)     Felt like \"bugs crawling on skin\"    Percodan [Oxycodone-Aspirin] Nausea And Vomiting     Social History     Socioeconomic History    Marital status:      Spouse name: Not on file    Number of children: Not on file    Years of education: Not on file    Highest education level: Not on file   Occupational History    Not on file   Tobacco Use    Smoking status: Former     Packs/day: 0.25     Years: 10.00     Pack years: 2.50     Types: Cigarettes     Quit date: 2010     Years since quittin.9    Smokeless tobacco: Never   Vaping Use    Vaping Use: Never used   Substance and Sexual Activity    Alcohol use: Yes     Comment: social    Drug use: No    Sexual activity: Not on file   Other Topics Concern    Not on file   Social History Narrative        DEPRESSION. RIGHT ANKLE SURGERIES TIMES THREE WITH CHRONIC SEVERE PAIN DUE TO INJURY FROM    . FATIGUE. Yesi Jennings  1960 Page #2    WEIGHT GAIN. FAMILY HISTORY OF DIABETES. IRRITABLE BOWEL SYNDROME. DEATH OF A SON 2 YEARS AGO. PROBABLY CARPAL TUNNEL SYNDROME. LEFT POSTERIOR NECK MASS.     POS RA ---DR SHEARER-----[facility]---TOLD NO RA BUT ONLY FIBROMYALGIA--    CHANGED TO DR VEGA---PT REFUSING EMBREL    LOW B-12     SON IN [place]    FX L 10 11 12 RIBS    CTS OR 2010---RIGHT    COLON ---SEVERE TICS SIGMOID AND PAN TIC    EGD  GASTRITIS REPEATED IN  AND IMPROVED---    SMOKER QUIT AGE 46    EGD DR CHAUDHARI 3-16 AND SETTING UP PH STUDY    20 YR OL D SON SUICIDE  IN [place]------had one kaleigh and pt not see   in town------------PT SON HAS 5 KIDS---pt hardly see    MOM  2017---AT AGE 89    TWO SONS--ONE -----OTHER MOVED FROM [place] TO [place] AND MOVED INTO [facility]---    LEFT ANKLE REPLACEMENT 3-21-18 DR Ignacio Orozco----    RIGHT ANKLE REPLACEMENT  DR gInacio rosenbaum  for urine incont started  est  vag    craem weekly   2-2020    Left shoulder OR  3-20  Dr Jennifer Carranza    Left total knee   dr Pb Goldman with Marrian Lesches cousin    Covid  vaccine   4-8-21   Odinoson   eelv   dimer and [facility] to [facility]   5-21 pre op clearance       Left  TKA  dr Johana Peterson    6-14-21    Right TKA   3-7-22     Ninfa Brannon son volunteering in [facility] to fight  6-22    Low back pain and left leg pain     9-22 with severe arth  L-4--5---L-5--S-1---order mri and dr jackson-----listhesis  L--4---L-5---dr read--did shots 10-22    Uses marijuana  vaping   9-22     Social Determinants of Health     Financial Resource Strain: Not on file   Food Insecurity: Not on file   Transportation Needs: Not on file   Physical Activity: Not on file   Stress: Not on file   Social Connections: Not on file   Intimate Partner Violence: Not on file   Housing Stability: Not on file      Past Medical History:   Diagnosis Date    Childhood asthma     Depression with anxiety     Diverticulitis     Fatigue     Fracture, ribs     10 11 12 ribs     History of stomach ulcers     IBS (irritable bowel syndrome)     Low vitamin B12 level     PTSD (post-traumatic stress disorder)     RA (rheumatoid arthritis) ([facility] [address]     right ankle    RA    Weight gain      Family History   Family history unknown: Yes      Past Surgical History:   Procedure Laterality Date    ACHILLES TENDON SURGERY Right 2/20/2019    RIGHT ACHILLES TENDON LENGTHENING performed by Jean Pierre Vega MD at [address] Bilateral     CARPAL TUNNEL RELEASE Right     CARPAL TUNNEL RELEASE Right 06/2010    COLONOSCOPY  11/2012    severe TICS Sigmoid and Pan Tic     FOOT NEUROMA SURGERY      bilat    KNEE ARTHROSCOPY      left    OTHER SURGICAL HISTORY      excision neck mass    [place] TOTAL ANKLE REPLACEMENT Left 3/21/2018    LEFT TOTAL ANKLE REPLACEMENT LEFT FOOT CHEILECTOMY   ([facility]) performed by Jean Pierre Vega MD at [address] ARTHROSCOPY Right TONSILLECTOMY      TOTAL ANKLE ARTHROPLASTY Right 2/20/2019    RIGHT ANKLE TOTAL ARTHROPLASTY performed by Jhon Jerry MD at [facility] ENDOSCOPY  4/4/2016    UPPER GASTROINTESTINAL ENDOSCOPY  11/2012    repeated 02-13 and improved       Vitals:    11/02/22 1413   BP: 134/82   Pulse: 94   Temp: 97.6 °F (36.4 °C)   SpO2: 96%   Weight: 187 lb 12.8 oz (85.2 kg)   Height: 5' 4\" (1.626 m)       Objective:    Physical Exam  Vitals reviewed. Constitutional:       Appearance: Normal appearance. She is well-developed. HENT:      Head: Normocephalic. Right Ear: Tympanic membrane normal.      Left Ear: Tympanic membrane normal.      Nose: Nose normal.      Mouth/Throat:      Mouth: Mucous membranes are moist.   Eyes:      Pupils: Pupils are equal, round, and reactive to light. Cardiovascular:      Rate and Rhythm: Normal rate and regular rhythm. Pulmonary:      Effort: Pulmonary effort is normal.      Breath sounds: Normal breath sounds. Abdominal:      General: Bowel sounds are normal.      Palpations: Abdomen is soft. Musculoskeletal:      Cervical back: Normal range of motion. Comments: Para lubmar  spasm   Skin:     General: Skin is warm. Neurological:      Mental Status: She is alert and oriented to person, place, and time. Psychiatric:         Behavior: Behavior normal.       Yesi was seen today for cough, congestion and back pain. Diagnoses and all orders for this visit:    Herniation of lumbar intervertebral disc with radiculopathy    Cough in adult  -     POCT COVID-19, Antigen    Mixed stress and urge urinary incontinence    Gastroesophageal reflux disease without esophagitis  -     External Referral To General Surgery    Rheumatoid arthritis involving multiple sites with positive rheumatoid factor ([facility] [address]    Other orders  -     gabapentin (NEURONTIN) 100 MG capsule; Take 1 capsule by mouth nightly for 30 days.  Intended supply: 30 days      Comments:  fu with dr Belinda Giron  appt dr Bushra Bourne per request pt      no  nsaids     fuwith   gyn urol and pt  reuf    I educated the patient about all medications. Make sure they were correct and educated  on the risk associated with missing meds or taking them incorrectly. A list of medications is being sent home with patient today. Check blood pressure at home twice a day. Low-salt low caffeine diet. Call if systolic blood pressure is above 346 and diastolic blood pressures above 85. Only use a upper arm digital cuff. Aggressive low-fat diet. Avoid red meats, greasy fried foods, dairy products. Avoid processed foods. Take cholesterol medications without food. I informed patient about the risk associated with noncompliance of medication and taking medications incorrectly. Appropriate follow-up with myself and all specialist.  Encourage family members to take active role in assisting with medications and medical care. If any confusion should develop to notify my office immediately to avoid risk of worsening medical condition    A great deal of time spent reviewing medications, diet, exercise, social issues. Also reviewing the chart before entering the room with patient and finishing charting after leaving patient's room. More than half of that time was spent face to face with the patient in counseling and coordinating care.       Follow Up: Return for Reg Appt, See Referral.     Seen by:  Maryellen Rose, DO

## 2022-11-03 ENCOUNTER — NURSE ONLY (OUTPATIENT)
Dept: PRIMARY CARE CLINIC | Age: 62
End: 2022-11-03
Payer: MEDICARE

## 2022-11-03 DIAGNOSIS — R35.0 FREQUENCY OF MICTURITION: ICD-10-CM

## 2022-11-03 DIAGNOSIS — R35.0 FREQUENCY OF MICTURITION: Primary | ICD-10-CM

## 2022-11-03 LAB
BILIRUBIN, POC: NEGATIVE
BLOOD URINE, POC: NORMAL
CLARITY, POC: CLEAR
COLOR, POC: YELLOW
GLUCOSE URINE, POC: NEGATIVE
KETONES, POC: NEGATIVE
LEUKOCYTE EST, POC: NORMAL
NITRITE, POC: NEGATIVE
PH, POC: 6.5
PROTEIN, POC: NEGATIVE
SPECIFIC GRAVITY, POC: <=1.005
UROBILINOGEN, POC: 0.2

## 2022-11-03 PROCEDURE — 81002 URINALYSIS NONAUTO W/O SCOPE: CPT | Performed by: FAMILY MEDICINE

## 2022-11-04 ENCOUNTER — TELEPHONE (OUTPATIENT)
Dept: PRIMARY CARE CLINIC | Age: 62
End: 2022-11-04

## 2022-11-04 NOTE — TELEPHONE ENCOUNTER
The pt is calling asking if you can give her a call she would like to talk to you about what Dr Edson Beard wants to do for her back and get your opinion

## 2022-11-05 LAB — URINE CULTURE, ROUTINE: NORMAL

## 2022-11-07 ENCOUNTER — TELEPHONE (OUTPATIENT)
Dept: PRIMARY CARE CLINIC | Age: 62
End: 2022-11-07

## 2022-11-07 NOTE — TELEPHONE ENCOUNTER
----- Message from Mahesh Red sent at 11/7/2022  2:08 PM EST -----  Subject: Message to Provider    QUESTIONS  Information for Provider? Patient would like a call back to let her know   if she needs labs to help rule out diabetes. Patient would like to know if   she needs labs and would like to talk about the jenny catheter they want   to inject into her spine. She would like the provider to call and advise.  ---------------------------------------------------------------------------  --------------  Basilia Counter INFO  [phone]; OK to leave message on voicemail  ---------------------------------------------------------------------------  --------------  SCRIPT ANSWERS  Relationship to Patient?  Self

## 2022-11-07 NOTE — TELEPHONE ENCOUNTER
----- Message from Laura Hawkins sent at 11/7/2022  2:05 PM EST -----  Subject: Results Request    QUESTIONS  Results: urine test; Ordered by: Urban Pals   Date Performed: 2022-11-04  ---------------------------------------------------------------------------  --------------  Margaux RAMIREZ    [phone]; OK to leave message on voicemail  ---------------------------------------------------------------------------  --------------

## 2022-11-08 NOTE — TELEPHONE ENCOUNTER
Inform patient her sugar was normal with her last lab in June he should schedule blood work again with her December visit. Ask her which she has questions about injection.

## 2022-11-08 NOTE — TELEPHONE ENCOUNTER
Notified of neg urine culture. She will have labs drawn prior to appt in Dec. Feels better with physical therapy. Do you think she should go ahead with the caudal epidural now or wait a while?

## 2022-11-11 ENCOUNTER — TELEPHONE (OUTPATIENT)
Dept: PRIMARY CARE CLINIC | Age: 62
End: 2022-11-11

## 2022-11-22 ENCOUNTER — OFFICE VISIT (OUTPATIENT)
Dept: FAMILY MEDICINE CLINIC | Age: 62
End: 2022-11-22
Payer: MEDICARE

## 2022-11-22 VITALS
OXYGEN SATURATION: 96 % | TEMPERATURE: 97.3 F | HEART RATE: 86 BPM | WEIGHT: 180 LBS | SYSTOLIC BLOOD PRESSURE: 120 MMHG | BODY MASS INDEX: 30.73 KG/M2 | DIASTOLIC BLOOD PRESSURE: 74 MMHG | HEIGHT: 64 IN

## 2022-11-22 DIAGNOSIS — J01.90 ACUTE NON-RECURRENT SINUSITIS, UNSPECIFIED LOCATION: ICD-10-CM

## 2022-11-22 DIAGNOSIS — H57.89 IRRITATION OF RIGHT EYE: ICD-10-CM

## 2022-11-22 DIAGNOSIS — H61.23 BILATERAL IMPACTED CERUMEN: ICD-10-CM

## 2022-11-22 DIAGNOSIS — J06.9 ACUTE UPPER RESPIRATORY INFECTION, UNSPECIFIED: Primary | ICD-10-CM

## 2022-11-22 DIAGNOSIS — R05.9 COUGH, UNSPECIFIED TYPE: ICD-10-CM

## 2022-11-22 LAB
INFLUENZA A ANTIBODY: NORMAL
INFLUENZA B ANTIBODY: NORMAL
Lab: NORMAL
QC PASS/FAIL: NORMAL
SARS-COV-2 RDRP RESP QL NAA+PROBE: NEGATIVE

## 2022-11-22 PROCEDURE — 87804 INFLUENZA ASSAY W/OPTIC: CPT | Performed by: PHYSICIAN ASSISTANT

## 2022-11-22 PROCEDURE — 99214 OFFICE O/P EST MOD 30 MIN: CPT | Performed by: PHYSICIAN ASSISTANT

## 2022-11-22 PROCEDURE — 87635 SARS-COV-2 COVID-19 AMP PRB: CPT | Performed by: PHYSICIAN ASSISTANT

## 2022-11-22 PROCEDURE — 69210 REMOVE IMPACTED EAR WAX UNI: CPT | Performed by: PHYSICIAN ASSISTANT

## 2022-11-22 RX ORDER — DOXYCYCLINE HYCLATE 100 MG
100 TABLET ORAL 2 TIMES DAILY
Qty: 20 TABLET | Refills: 0 | Status: SHIPPED
Start: 2022-11-22 | End: 2022-11-22

## 2022-11-22 RX ORDER — TOBRAMYCIN 3 MG/ML
1 SOLUTION/ DROPS OPHTHALMIC EVERY 4 HOURS
Qty: 5 ML | Refills: 0 | Status: SHIPPED | OUTPATIENT
Start: 2022-11-22 | End: 2022-12-02

## 2022-11-22 RX ORDER — AZITHROMYCIN 250 MG/1
250 TABLET, FILM COATED ORAL SEE ADMIN INSTRUCTIONS
Qty: 6 TABLET | Refills: 0 | Status: SHIPPED | OUTPATIENT
Start: 2022-11-22 | End: 2022-11-27

## 2022-11-22 NOTE — PROGRESS NOTES
22  Yesi Jennings : 1960 Sex: female  Age 57 y.o. Subjective:  Chief Complaint   Patient presents with    Congestion    Otalgia     Left, states hearing swishing in ear    Eye Pain     Right, states feels like something in eye, itchy and painful. No drainage    Fatigue    Chills    Cough     Productive, yellow sputum         HPI:   Yesi Jennings , 57 y.o. female presents to express care for evaluation of congestion, ear pain, eye pain, fatigue, chills, cough    HPI  58-year-old female presents to express care for evaluation of sinus congestion, ear pain, cough, right eye irritation, fatigue and chills. The patient has had the symptoms ongoing for the last few weeks. The patient thought that it was related to allergies had seen PCP. The symptoms do not seem to be improving. The patient did have some chills last night and has been having a productive yellow sputum with her cough now. The patient has noted some increased irritation to the left ear. The patient is also noted some right eye irritation. Also seems to be some mucus drainage and discharge noted      ROS:   Unless otherwise stated in this report the patient's positive and negative responses for review of systems for constitutional, eyes, ENT, cardiovascular, respiratory, gastrointestinal, neurological, , musculoskeletal, and integument systems and related systems to the presenting problem are either stated in the history of present illness or were not pertinent or were negative for the symptoms and/or complaints related to the presenting medical problem. Positives and pertinent negatives as per HPI. All others reviewed and are negative.       PMH:     Past Medical History:   Diagnosis Date    Childhood asthma     Depression with anxiety     Diverticulitis     Fatigue     Fracture, ribs     10 11 12 ribs     History of stomach ulcers     IBS (irritable bowel syndrome)     Low vitamin B12 level     PTSD (post-traumatic stress disorder)     RA (rheumatoid arthritis) ([facility] [address]     right ankle    RA    Weight gain        Past Surgical History:   Procedure Laterality Date    ACHILLES TENDON SURGERY Right 2/20/2019    RIGHT ACHILLES TENDON LENGTHENING performed by Romina Bajwa MD at [address] Bilateral     CARPAL TUNNEL RELEASE Right     CARPAL TUNNEL RELEASE Right 06/2010    COLONOSCOPY  11/2012    severe TICS Sigmoid and Pan Tic     FOOT NEUROMA SURGERY      bilat    KNEE ARTHROSCOPY      left    OTHER SURGICAL HISTORY      excision neck mass    [place] TOTAL ANKLE REPLACEMENT Left 3/21/2018    LEFT TOTAL ANKLE REPLACEMENT LEFT FOOT CHEILECTOMY   ([facility]) performed by Romina Bajwa MD at [address] ARTHROSCOPY Right     TONSILLECTOMY      TOTAL ANKLE ARTHROPLASTY Right 2/20/2019    RIGHT ANKLE TOTAL ARTHROPLASTY performed by Romina Bajwa MD at [facility]  4/4/2016    UPPER GASTROINTESTINAL ENDOSCOPY  11/2012    repeated 02-13 and improved        Family History   Family history unknown: Yes       Medications:     Current Outpatient Medications:     tobramycin (TOBREX) 0.3 % ophthalmic solution, Place 1 drop into both eyes every 4 hours for 10 days, Disp: 5 mL, Rfl: 0    doxycycline hyclate (VIBRA-TABS) 100 MG tablet, Take 1 tablet by mouth 2 times daily for 10 days, Disp: 20 tablet, Rfl: 0    gabapentin (NEURONTIN) 100 MG capsule, Take 1 capsule by mouth nightly for 30 days.  Intended supply: 30 days, Disp: 30 capsule, Rfl: 0    FLUoxetine (PROZAC) 20 MG capsule, Take 20 mg by mouth 3 times daily, Disp: , Rfl:     zolpidem (AMBIEN) 5 MG tablet, Take 2.5 mg by mouth nightly as needed for Sleep., Disp: , Rfl:     calcium carbonate (OSCAL) 500 MG TABS tablet, Take 500 mg by mouth daily, Disp: , Rfl:     Multiple Vitamins-Minerals (CENTRUM ADULTS PO), Take by mouth daily, Disp: , Rfl:     Omega-3 1000 MG CAPS, Take by mouth daily, Disp: , Rfl: Cholecalciferol (VITAMIN D) 50 MCG (2000 UT) CAPS capsule, Take by mouth daily, Disp: , Rfl:     vitamin E 1000 units capsule, Take 1,000 Units by mouth daily, Disp: , Rfl:     Turmeric 500 MG CAPS, Take by mouth daily, Disp: , Rfl:     Ascorbic Acid (VITAMIN C) 250 MG tablet, Take 250 mg by mouth daily, Disp: , Rfl:     Allergies: Allergies   Allergen Reactions    Iodides     Percocet [Oxycodone-Acetaminophen] Nausea And Vomiting and Other (See Comments)     Felt like \"bugs crawling on skin\"    Percodan [Oxycodone-Aspirin] Nausea And Vomiting       Social History:     Social History     Tobacco Use    Smoking status: Former     Packs/day: 0.25     Years: 10.00     Pack years: 2.50     Types: Cigarettes     Quit date: 2010     Years since quittin.0    Smokeless tobacco: Never   Vaping Use    Vaping Use: Never used   Substance Use Topics    Alcohol use: Yes     Comment: social    Drug use: No       Patient lives at home. Physical Exam:     Vitals:    22 1206   BP: 120/74   Pulse: 86   Temp: 97.3 °F (36.3 °C)   SpO2: 96%   Weight: 180 lb (81.6 kg)   Height: 5' 4\" (1.626 m)       Exam:  Physical Exam  Nurse's notes and vital signs reviewed. The patient is not hypoxic. ? General: Alert, no acute distress, patient resting comfortably Patient is not toxic or lethargic. Skin: Warm, intact, no pallor noted. There is no evidence of rash at this time. Head: Normocephalic, atraumatic  Eye: Normal conjunctiva, there is slight erythema noted to the right conjunctive a but there is no evidence of diffuse erythema or definite conjunctivitis. There is evidence of what appears to be mucus discharge noted to the lateral conjunctive a. PERRLA, EOMI  Ears, Nose, Throat: Right and left tympanic membrane do show evidence of cerumen impaction slightly greater on the left than on the right minimal nasal congestion, rhinorrhea  No drainage or discharge noted.  No pre- or post-auricular tenderness, erythema, or swelling noted. No rhinorrhea or congestion noted. Posterior oropharynx shows no erythema, tonsillar hypertrophy, or exudate. the uvula is midline. No trismus or drooling is noted. Moist mucous membranes. Cardiovascular: Regular Rate and Rhythm  Respiratory: No acute distress, no rhonchi, wheezing or crackles noted. No stridor or retractions are noted. Neurological: A&O x4, normal speech  Psychiatric: Cooperative       Testing:     Results for orders placed or performed in visit on 11/22/22   POCT Influenza A/B   Result Value Ref Range    Influenza A Ab neg     Influenza B Ab neg    POCT COVID-19 Rapid, NAAT   Result Value Ref Range    SARS-COV-2, RdRp gene Negative Negative    Lot Number [id number]     QC Pass/Fail pass            Medical Decision Making:     Vital signs reviewed    Past medical history reviewed. Allergies reviewed. Medications reviewed. Patient on arrival does not appear to be in any apparent distress or discomfort. The patient has been seen and evaluated. The patient does not appear to be toxic or lethargic.      the patient had a influenza and COVID swab obtained both of which were negative. The patient will have both ears irrigated and removal of the earwax with ear curette. The patient tolerated the procedure well. The patient is doing well at this point. We will have the patient continue to monitor signs and symptoms closely. Wax was removed and there is no evidence of retained wax    The patient will be started on doxycycline, Tobrex. Patient does need to follow-up with ophthalmology for further evaluation of the eye. Would recommend follow-up today or tomorrow. The patient cannot tolerate doxycycline we will switch to azithromycin. The patient is to use over-the-counter decongestants, nasal sprays, Motrin, Tylenol and follow-up with PCP.     The patient is to return to express care or go directly to the emergency department should any of the signs or symptoms worsen. The patient is to followup with primary care physician in 2-3 days for repeat evaluation. The patient has no other questions or concerns at this time the patient will be discharged home. Clinical Impression:   Yesi was seen today for congestion, otalgia, eye pain, fatigue, chills and cough. Diagnoses and all orders for this visit:    Acute upper respiratory infection, unspecified    Cough, unspecified type  -     POCT Influenza A/B  -     POCT COVID-19 Rapid, NAAT    Bilateral impacted cerumen  -     30066 - [place] REMOVE IMPACTED EAR WAX    Acute non-recurrent sinusitis, unspecified location    Irritation of right eye    Other orders  -     tobramycin (TOBREX) 0.3 % ophthalmic solution; Place 1 drop into both eyes every 4 hours for 10 days  -     doxycycline hyclate (VIBRA-TABS) 100 MG tablet; Take 1 tablet by mouth 2 times daily for 10 days      The patient is to call for any concerns or return if any of the signs or symptoms worsen. The patient is to follow-up with PCP in the next 2-3 days for repeat evaluation repeat assessment or go directly to the emergency department.      SIGNATURE: Felipe Mc III, PA-C

## 2022-11-22 NOTE — TELEPHONE ENCOUNTER
2 weeks later no response   Opened by: Damari Benitez DO                on Fri Nov 4, 2022 12:29 PM         Doned by: Damari Benitez DO                on Mon Nov 7, 2022  5:18 AM

## 2022-12-05 ENCOUNTER — PREP FOR PROCEDURE (OUTPATIENT)
Dept: SURGERY | Age: 62
End: 2022-12-05

## 2022-12-05 RX ORDER — SODIUM CHLORIDE 9 MG/ML
INJECTION, SOLUTION INTRAVENOUS CONTINUOUS
Status: CANCELLED | OUTPATIENT
Start: 2022-12-05

## 2022-12-05 NOTE — H&P
Name: Claudean Pons               : 1960 Sex: F  Age: 57 yrs  Acct#:  [id number]            Patient was referred by Rodolfo Coffey DO. Patient's primary care provider is Rodolfo Coffey DO.  CC: Patient presents for GERD    HPI: Presents with GERD. Recent hx numerous ortho procedures. Worsening GERD. Unknown last colonoscopy. Denied any BRBPR. Some constipation. Meds Prior to Visit:  Prozac     Ambien     Gabapentin     Prilosec  40 mg  take (1) capsule by mouth once every day. 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 FH:  Father:    Heart Disease. Mother:  Alive  Rheumatoid Arthritis  Hypertension  GI Disorder. Reviewed, no changes. SH:  Personal Habits:  Tobacco Use: Patient is a former smoker. Alcohol: Current Alcohol Use; Social.Drug Use: Denies Drug Use. Daily Caffeine: Uses Caffeine, Consumes on average 1 cup of regular coffee per day. Reviewed, no changes. Date: 2022  Was the patient queried about smoking behavior? Yes  No  Does the patient currently smoke? Tobacco Use: Patient is a former smoker.     ROS:  Const: Denies anorexia, anxiety, fatigue, night sweats, weight gain and weight loss. Eyes: Denies eye symptoms. ENMT: Denies ear symptoms. Denies nasal symptoms. Denies mouth or throat symptoms. CV: Denies hypertension and other cardiovascular symptoms. Resp: Reports other respiratory symptoms. GI: Reports other gastrointestinal symptoms, but denies hepatitis and liver disease. Musculo: Denies musculoskeletal symptoms. Skin: Denies skin, hair and nail symptoms. Breast: Denies breast problems. Neuro: Denies neurologic symptoms. Psych: Reports depression, but denies substance abuse. Endocrine: Denies diabetes, kidney disease and thyroid disease. Hema/Lymph: Denies anemia, blood disease, cancer and past transfusion. Allergy/Immuno: Reports rheumatoid arthritis, but denies immunosuppression. Reviewed, no changes. Wt Prior: 186lb as of 03/28/16    Exam:  Const: Appears healthy and well developed. No signs of apparent distress present. Head/Face: Atraumatic, normocephalic on inspection. Eyes: Conjunctivae pink. Pupils equal, round and reactive to light. Sclerae clear and anicteric. ENMT: External ears WNL. External nose WNL. Lips: Appear normal and healthy. Neck: Normal to inspection. Normal to palpation. No masses appreciated. Trachea midline. Thyroid is normal in size. No jugular venous distention. Resp: Respiration rate is normal. No wheezing. Clear to auscultation bilaterally. No rales or rhonchi appreciated over the lungs bilaterally. CV: Rate is regular. Rhythm is regular. S1 is normal. S2 is normal. No heart murmur appreciated. Extremities: No clubbing or cyanosis. No edema of the lower limbs bilaterally. Abdomen: No visible herniations. No abdominal scars. Positive bowel sounds in all quadrants. Abdomen is soft, nontender, and nondistended without guarding, rigidity or rebound tenderness. No palpable abdominal aneurysms present. No palpable hepatosplenomegaly.   Lymph: No palpable lymphadenopathy in the cervical, supraclavicular, axillary and inguinal region(s). Musculo: Walks with a normal gait. Upper Extremities: Normal to inspection. ROM: Full ROM bilaterally. Lower Extremities: Normal to inspection. ROM: Full ROM bilaterally. Skin: Skin warm and dry with no evidence of unusual rashes or suspicious lesions. Neuro: Alert and oriented x3. Mood is normal.  Cranial Nerves: Cranial nerves II-XII grossly intact. Psych: Cognition: Judgment and insight are grossly intact.          Assessment #1: Hx Z12.11 Encounter for screening for malignant neoplasm of colon   Care Plan:              Comments       :  Screening colonoscopy  Risks, benefits, alternatives, complications, need for barium enema as well as colonic perforation all discussed     Assessment #2: Hx K21.9 Gastro-esophageal reflux disease without esophagitis   Care Plan:              Comments       :  worsening GERD    Risks, benefits, alternatives, complications all discussed  Decision-making was moderate in complexity  Approximately 30 minutes spent reviewing information, data provided/obtained prior to office visit, obtaining information (history and physical) during office visit, in order to coordinate care regarding goals of care, diagnosis and prognosis              Seen by:

## 2022-12-05 NOTE — TELEPHONE ENCOUNTER
Opened by: Maryellen Rose DO                on Thu Dec 1, 2022  5:07 AM         Doned by: Maryellen Rose DO                on Thu Dec 1, 2022  5:08 AM

## 2022-12-20 ENCOUNTER — OFFICE VISIT (OUTPATIENT)
Dept: PRIMARY CARE CLINIC | Age: 62
End: 2022-12-20
Payer: MEDICARE

## 2022-12-20 VITALS
TEMPERATURE: 97.5 F | BODY MASS INDEX: 31 KG/M2 | WEIGHT: 181.6 LBS | SYSTOLIC BLOOD PRESSURE: 134 MMHG | HEIGHT: 64 IN | DIASTOLIC BLOOD PRESSURE: 78 MMHG

## 2022-12-20 DIAGNOSIS — E03.9 ACQUIRED HYPOTHYROIDISM: ICD-10-CM

## 2022-12-20 DIAGNOSIS — E11.9 DIET-CONTROLLED DIABETES MELLITUS (HCC): ICD-10-CM

## 2022-12-20 DIAGNOSIS — E78.2 MIXED HYPERLIPIDEMIA: ICD-10-CM

## 2022-12-20 DIAGNOSIS — M51.16 HERNIATION OF LUMBAR INTERVERTEBRAL DISC WITH RADICULOPATHY: Primary | ICD-10-CM

## 2022-12-20 DIAGNOSIS — M05.79 RHEUMATOID ARTHRITIS INVOLVING MULTIPLE SITES WITH POSITIVE RHEUMATOID FACTOR (HCC): Chronic | ICD-10-CM

## 2022-12-20 DIAGNOSIS — M81.0 AGE-RELATED OSTEOPOROSIS WITHOUT CURRENT PATHOLOGICAL FRACTURE: ICD-10-CM

## 2022-12-20 DIAGNOSIS — I10 ESSENTIAL HYPERTENSION: ICD-10-CM

## 2022-12-20 DIAGNOSIS — F41.9 ANXIETY: ICD-10-CM

## 2022-12-20 LAB
ALBUMIN SERPL-MCNC: 4.4 G/DL (ref 3.5–5.2)
ALP BLD-CCNC: 140 U/L (ref 35–104)
ALT SERPL-CCNC: 28 U/L (ref 0–32)
ANION GAP SERPL CALCULATED.3IONS-SCNC: 10 MMOL/L (ref 7–16)
AST SERPL-CCNC: 29 U/L (ref 0–31)
BASOPHILS ABSOLUTE: 0.04 E9/L (ref 0–0.2)
BASOPHILS RELATIVE PERCENT: 0.7 % (ref 0–2)
BILIRUB SERPL-MCNC: 0.3 MG/DL (ref 0–1.2)
BUN BLDV-MCNC: 14 MG/DL (ref 6–23)
CALCIUM SERPL-MCNC: 10 MG/DL (ref 8.6–10.2)
CHLORIDE BLD-SCNC: 100 MMOL/L (ref 98–107)
CO2: 28 MMOL/L (ref 22–29)
CREAT SERPL-MCNC: 0.7 MG/DL (ref 0.5–1)
EOSINOPHILS ABSOLUTE: 0.55 E9/L (ref 0.05–0.5)
EOSINOPHILS RELATIVE PERCENT: 9.3 % (ref 0–6)
GFR SERPL CREATININE-BSD FRML MDRD: >60 ML/MIN/1.73
GLUCOSE BLD-MCNC: 93 MG/DL (ref 74–99)
HBA1C MFR BLD: 5.5 % (ref 4–5.6)
HCT VFR BLD CALC: 39 % (ref 34–48)
HEMOGLOBIN: 12.2 G/DL (ref 11.5–15.5)
IMMATURE GRANULOCYTES #: 0.01 E9/L
IMMATURE GRANULOCYTES %: 0.2 % (ref 0–5)
LYMPHOCYTES ABSOLUTE: 1.97 E9/L (ref 1.5–4)
LYMPHOCYTES RELATIVE PERCENT: 33.4 % (ref 20–42)
MCH RBC QN AUTO: 28.2 PG (ref 26–35)
MCHC RBC AUTO-ENTMCNC: 31.3 % (ref 32–34.5)
MCV RBC AUTO: 90.3 FL (ref 80–99.9)
MONOCYTES ABSOLUTE: 0.47 E9/L (ref 0.1–0.95)
MONOCYTES RELATIVE PERCENT: 8 % (ref 2–12)
NEUTROPHILS ABSOLUTE: 2.85 E9/L (ref 1.8–7.3)
NEUTROPHILS RELATIVE PERCENT: 48.4 % (ref 43–80)
PDW BLD-RTO: 14 FL (ref 11.5–15)
PLATELET # BLD: 218 E9/L (ref 130–450)
PMV BLD AUTO: 12.2 FL (ref 7–12)
POTASSIUM SERPL-SCNC: 4.2 MMOL/L (ref 3.5–5)
RBC # BLD: 4.32 E12/L (ref 3.5–5.5)
RHEUMATOID FACTOR: 86 IU/ML (ref 0–13)
SODIUM BLD-SCNC: 138 MMOL/L (ref 132–146)
T4 TOTAL: 6.5 MCG/DL (ref 4.5–11.7)
TOTAL PROTEIN: 7.7 G/DL (ref 6.4–8.3)
TSH SERPL DL<=0.05 MIU/L-ACNC: 1.24 UIU/ML (ref 0.27–4.2)
WBC # BLD: 5.9 E9/L (ref 4.5–11.5)

## 2022-12-20 PROCEDURE — 99214 OFFICE O/P EST MOD 30 MIN: CPT | Performed by: FAMILY MEDICINE

## 2022-12-20 RX ORDER — GABAPENTIN 300 MG/1
300 CAPSULE ORAL NIGHTLY
Qty: 90 CAPSULE | Refills: 0 | Status: SHIPPED | OUTPATIENT
Start: 2022-12-20 | End: 2032-12-23

## 2022-12-20 ASSESSMENT — PATIENT HEALTH QUESTIONNAIRE - PHQ9
1. LITTLE INTEREST OR PLEASURE IN DOING THINGS: 0
SUM OF ALL RESPONSES TO PHQ9 QUESTIONS 1 & 2: 0
SUM OF ALL RESPONSES TO PHQ QUESTIONS 1-9: 0
2. FEELING DOWN, DEPRESSED OR HOPELESS: 0
SUM OF ALL RESPONSES TO PHQ QUESTIONS 1-9: 0

## 2022-12-20 ASSESSMENT — ENCOUNTER SYMPTOMS
GASTROINTESTINAL NEGATIVE: 1
EYES NEGATIVE: 1
RESPIRATORY NEGATIVE: 1
BACK PAIN: 1
ALLERGIC/IMMUNOLOGIC NEGATIVE: 1

## 2022-12-20 NOTE — PROGRESS NOTES
22  Name: Nora Turner    : 1960    Sex: female    Age: 57 y.o. Subjective:  Chief Complaint: Patient is here for 6 mo ck  re lwo back pain oa     hip  pain   sleep        Sees  dr Arline Obrien   next week   will have her get lab   this wek    for him and me  Diet exer   she put off bunion surgery      Review of Systems   Constitutional: Negative. HENT: Negative. Eyes: Negative. Respiratory: Negative. Cardiovascular: Negative. Gastrointestinal: Negative. Endocrine: Negative. Genitourinary: Negative. Musculoskeletal:  Positive for arthralgias and back pain. Skin: Negative. Allergic/Immunologic: Negative. Neurological: Negative. Hematological: Negative. Psychiatric/Behavioral: Negative. Current Outpatient Medications:     gabapentin (NEURONTIN) 300 MG capsule, Take 1 capsule by mouth nightly.  Intended supply: 30 days, Disp: 90 capsule, Rfl: 0    FLUoxetine (PROZAC) 20 MG capsule, Take 20 mg by mouth 3 times daily, Disp: , Rfl:     zolpidem (AMBIEN) 5 MG tablet, Take 2.5 mg by mouth nightly as needed for Sleep., Disp: , Rfl:     calcium carbonate (OSCAL) 500 MG TABS tablet, Take 500 mg by mouth daily, Disp: , Rfl:     Multiple Vitamins-Minerals (CENTRUM ADULTS PO), Take by mouth daily, Disp: , Rfl:     Omega-3 1000 MG CAPS, Take by mouth daily, Disp: , Rfl:     Cholecalciferol (VITAMIN D) 50 MCG ( UT) CAPS capsule, Take by mouth daily, Disp: , Rfl:     vitamin E 1000 units capsule, Take 1,000 Units by mouth daily, Disp: , Rfl:     Turmeric 500 MG CAPS, Take by mouth daily, Disp: , Rfl:     Ascorbic Acid (VITAMIN C) 250 MG tablet, Take 250 mg by mouth daily, Disp: , Rfl:   Allergies   Allergen Reactions    Iodides     Percocet [Oxycodone-Acetaminophen] Nausea And Vomiting and Other (See Comments)     Felt like \"bugs crawling on skin\"    Percodan [Oxycodone-Aspirin] Nausea And Vomiting     Social History     Socioeconomic History    Marital status:      Spouse name: Not on file    Number of children: Not on file    Years of education: Not on file    Highest education level: Not on file   Occupational History    Not on file   Tobacco Use    Smoking status: Former     Packs/day: 0.25     Years: 10.00     Pack years: 2.50     Types: Cigarettes     Quit date: 2010     Years since quittin.1    Smokeless tobacco: Never   Vaping Use    Vaping Use: Never used   Substance and Sexual Activity    Alcohol use: Yes     Comment: social    Drug use: No    Sexual activity: Not on file   Other Topics Concern    Not on file   Social History Narrative        DEPRESSION. RIGHT ANKLE SURGERIES TIMES THREE WITH CHRONIC SEVERE PAIN DUE TO INJURY FROM    . FATIGUE. Yesi Jennings  1960 Page #2    WEIGHT GAIN. FAMILY HISTORY OF DIABETES. IRRITABLE BOWEL SYNDROME. DEATH OF A SON 2 YEARS AGO. PROBABLY CARPAL TUNNEL SYNDROME. LEFT POSTERIOR NECK MASS.     POS RA ---DR SHEARER-----[facility]---TOLD NO RA BUT ONLY FIBROMYALGIA--    CHANGED TO DR VEGA---PT REFUSING EMBREL    LOW -12     SON IN [place]    FX L 10 11 12 RIBS    CTS OR 2010---RIGHT    COLON ---SEVERE TICS SIGMOID AND PAN TIC    EGD  GASTRITIS REPEATED IN  AND IMPROVED---    SMOKER QUIT AGE 46    EGD DR CHAUDHARI 3-16 AND SETTING UP PH STUDY    20 YR OL D SON SUICIDE  IN [place]------had one kaleigh and pt not see   in town------------PT SON HAS 5 KIDS---pt hardly see    MOM  ---AT AGE 91    TWO SONS--ONE -----OTHER MOVED FROM [place] TO [place] AND MOVED INTO [facility]---    LEFT ANKLE REPLACEMENT 3-21-18 DR Dread Castle----    RIGHT ANKLE REPLACEMENT  DR Chris rosenbaum  for urine incont     started  est  vag    craem weekly       Left shoulder OR  3-20  Dr Shirley Wallace    Left total knee   dr Manuel Goodwin with [facility] cousin    Covid  vaccine   21   [facility] and sent to [facility] 5-21 pre op clearance       Left  TKA  dr Vale Perry    6-14-21    Right TKA   3-7-22     Kamla Mace son volunteering in [facility] to fight  6-22    Low back pain and left leg pain     9-22 with severe arth  L-4--5---L-5--S-1---order mri and dr jackson-----listhesis  L--4---L-5---dr read--did shots 10-22    Uses marijuana  vaping   9-22     Social Determinants of Health     Financial Resource Strain: Not on file   Food Insecurity: Not on file   Transportation Needs: Not on file   Physical Activity: Not on file   Stress: Not on file   Social Connections: Not on file   Intimate Partner Violence: Not on file   Housing Stability: Not on file      Past Medical History:   Diagnosis Date    Childhood asthma     Depression with anxiety     Diverticulitis     Fatigue     Fracture, ribs     10 11 12 ribs     History of stomach ulcers     IBS (irritable bowel syndrome)     Low vitamin B12 level     PTSD (post-traumatic stress disorder)     RA (rheumatoid arthritis) ([facility] [address]     right ankle    RA    Weight gain      Family History   Family history unknown: Yes      Past Surgical History:   Procedure Laterality Date    ACHILLES TENDON SURGERY Right 2/20/2019    RIGHT ACHILLES TENDON LENGTHENING performed by Leyda Smith MD at [address] Bilateral     CARPAL TUNNEL RELEASE Right     CARPAL TUNNEL RELEASE Right 06/2010    COLONOSCOPY  11/2012    severe TICS Sigmoid and Pan Tic     FOOT NEUROMA SURGERY      bilat    KNEE ARTHROSCOPY      left    OTHER SURGICAL HISTORY      excision neck mass    [place] TOTAL ANKLE REPLACEMENT Left 3/21/2018    LEFT TOTAL ANKLE REPLACEMENT LEFT FOOT CHEILECTOMY   ([facility]) performed by Leyda Smith MD at [address] ARTHROSCOPY Right     TONSILLECTOMY      TOTAL ANKLE ARTHROPLASTY Right 2/20/2019    RIGHT ANKLE TOTAL ARTHROPLASTY performed by Leyda Smith MD at [facility] [facility]  4/4/2016    UPPER GASTROINTESTINAL ENDOSCOPY  11/2012    repeated 02-13 and improved       Vitals:    12/20/22 1358   BP: 134/78   Temp: 97.5 °F (36.4 °C)   Weight: 181 lb 9.6 oz (82.4 kg)   Height: 5' 4\" (1.626 m)       Objective:    Physical Exam  Vitals reviewed. Constitutional:       Appearance: Normal appearance. She is well-developed. HENT:      Head: Normocephalic. Right Ear: Tympanic membrane normal.      Left Ear: Tympanic membrane normal.      Nose: Nose normal.      Mouth/Throat:      Mouth: Mucous membranes are moist.   Eyes:      Pupils: Pupils are equal, round, and reactive to light. Cardiovascular:      Rate and Rhythm: Normal rate and regular rhythm. Pulmonary:      Effort: Pulmonary effort is normal.      Breath sounds: Normal breath sounds. Abdominal:      General: Bowel sounds are normal.      Palpations: Abdomen is soft. Musculoskeletal:      Cervical back: Normal range of motion. Comments: Para lubmar  spasm      Skin:     General: Skin is warm. Neurological:      Mental Status: She is alert and oriented to person, place, and time. Psychiatric:         Behavior: Behavior normal.       Yesi was seen today for discuss labs. Diagnoses and all orders for this visit:    Herniation of lumbar intervertebral disc with radiculopathy  -     gabapentin (NEURONTIN) 300 MG capsule; Take 1 capsule by mouth nightly. Intended supply: 30 days    Rheumatoid arthritis involving multiple sites with positive rheumatoid factor (HCC)    Anxiety    Mixed hyperlipidemia      Comments: lab today     sees rheum soon  angela [facility]    I educated the patient about all medications. Make sure they were correct and educated  on the risk associated with missing meds or taking them incorrectly. A list of medications is being sent home with patient today. Check blood pressure at home twice a day. Low-salt low caffeine diet.   Call if systolic blood pressure is above 225 and diastolic blood pressures above 85. Only use a upper arm digital cuff. Aggressive low-fat diet. Avoid red meats, greasy fried foods, dairy products. Avoid processed foods. Take cholesterol medications without food. I informed patient about the risk associated with noncompliance of medication and taking medications incorrectly. Appropriate follow-up with myself and all specialist.  Encourage family members to take active role in assisting with medications and medical care. If any confusion should develop to notify my office immediately to avoid risk of worsening medical condition      A great deal of time spent reviewing medications, diet, exercise, social issues. Also reviewing the chart before entering the room with patient and finishing charting after leaving patient's room. More than half of that time was spent face to face with the patient in counseling and coordinating care. Follow Up: Return in about 6 months (around 6/20/2023).      Seen by:  Flory Dunn DO

## 2022-12-21 ENCOUNTER — TELEPHONE (OUTPATIENT)
Dept: PRIMARY CARE CLINIC | Age: 62
End: 2022-12-21

## 2022-12-21 LAB
ANTI-NUCLEAR ANTIBODY (ANA): NEGATIVE
CHOLESTEROL, TOTAL: 246 MG/DL (ref 0–199)
HDLC SERPL-MCNC: 93 MG/DL
LDL CHOLESTEROL CALCULATED: 133 MG/DL (ref 0–99)
SEDIMENTATION RATE, ERYTHROCYTE: 30 MM/HR (ref 0–20)
TRIGL SERPL-MCNC: 100 MG/DL (ref 0–149)
VITAMIN D 25-HYDROXY: 50 NG/ML (ref 30–100)
VLDLC SERPL CALC-MCNC: 20 MG/DL

## 2022-12-21 NOTE — TELEPHONE ENCOUNTER
Notify patient labs are good except cholesterol is elevated to 246. Aggressive low-fat diet. All the arthritic labs are abnormal but similar to past.  We will see what rheumatology says.

## 2022-12-26 ENCOUNTER — TELEPHONE (OUTPATIENT)
Dept: PRIMARY CARE CLINIC | Age: 62
End: 2022-12-26

## 2022-12-28 ENCOUNTER — OFFICE VISIT (OUTPATIENT)
Dept: RHEUMATOLOGY | Age: 62
End: 2022-12-28
Payer: MEDICARE

## 2022-12-28 ENCOUNTER — HOSPITAL ENCOUNTER (OUTPATIENT)
Dept: GENERAL RADIOLOGY | Age: 62
Discharge: HOME | End: 2022-12-30
Payer: MEDICARE

## 2022-12-28 ENCOUNTER — HOSPITAL ENCOUNTER (OUTPATIENT)
Age: 62
Discharge: HOME | End: 2022-12-30
Payer: MEDICARE

## 2022-12-28 VITALS
DIASTOLIC BLOOD PRESSURE: 76 MMHG | SYSTOLIC BLOOD PRESSURE: 124 MMHG | WEIGHT: 183 LBS | BODY MASS INDEX: 31.24 KG/M2 | HEART RATE: 74 BPM | RESPIRATION RATE: 18 BRPM | OXYGEN SATURATION: 96 % | HEIGHT: 64 IN

## 2022-12-28 DIAGNOSIS — M05.79 RHEUMATOID ARTHRITIS INVOLVING MULTIPLE SITES WITH POSITIVE RHEUMATOID FACTOR (HCC): Chronic | ICD-10-CM

## 2022-12-28 DIAGNOSIS — M85.89 OSTEOPENIA OF MULTIPLE SITES: ICD-10-CM

## 2022-12-28 DIAGNOSIS — M05.79 RHEUMATOID ARTHRITIS INVOLVING MULTIPLE SITES WITH POSITIVE RHEUMATOID FACTOR (HCC): Primary | Chronic | ICD-10-CM

## 2022-12-28 DIAGNOSIS — M15.9 GENERALIZED OSTEOARTHRITIS: ICD-10-CM

## 2022-12-28 PROCEDURE — 73130 X-RAY EXAM OF HAND: CPT

## 2022-12-28 PROCEDURE — 73630 X-RAY EXAM OF FOOT: CPT

## 2022-12-28 PROCEDURE — 99214 OFFICE O/P EST MOD 30 MIN: CPT | Performed by: INTERNAL MEDICINE

## 2022-12-28 RX ORDER — METHYLPREDNISOLONE 4 MG/1
TABLET ORAL
Qty: 1 KIT | Refills: 0 | Status: SHIPPED | OUTPATIENT
Start: 2022-12-28

## 2022-12-28 ASSESSMENT — ENCOUNTER SYMPTOMS
DIARRHEA: 0
VOMITING: 0
TROUBLE SWALLOWING: 0
ABDOMINAL PAIN: 0
NAUSEA: 0
SHORTNESS OF BREATH: 0
COUGH: 0
COLOR CHANGE: 0

## 2022-12-28 NOTE — PROGRESS NOTES
Davina Kendrick 1960 is a 57 y.o. female, here for evaluation of the following chief complaint(s):  Follow-up (Patient here for a follow up for RA. )         ASSESSMENT/PLAN:    Davina Kendrick 1960 is a 57 y.o. female seen in follow-up for rheumatoid arthritis. 1.  RF positive, CCP positive, nonerosive positive rheumatoid arthritis-  She has failed Plaquenil and methotrexate in the past.  Her RF and CCP are both high positive, inflammatory markers are borderline and her synovitis has been minimal in the past and I actually see no obvious active synovitis on exam today. That said, I am recommending Humira to stop any progression of disease even though she is not really having a ton of joint pain. We discussed the risks, benefits, side effects and I have given her information on it. However, she hesitant to start any new medications. Since there is no evidence of erosive disease I do not think that is a totally unreasonable way to go. For now we will continue to monitor closely but if she starts to have more joint pain and swelling or there is any evidence of joint damage on x-rays we will need to proceed likely with Enbrel autoinjector since she is not thrilled about injections or possibly Simponi Aria, rather than Humira. 2.  Osteoarthritis-either way she has underlying osteoarthritis. She can continue naproxen 500 mg twice daily as needed with food. She has had both knees replaced and her left shoulder replaced. She has also been seeing pain management for spinal stenosis. Rheumatoid arthritis spares the back. She is having pain in the right second DIP joint. She previously had that joint fused. Rheumatoid arthritis typically does not affect the DIP joints. We will give her a Medrol pack today. If that is ineffective advised that she follow-up with her hand surgeon. 3.   Osteopenia-FRAX is not elevated to warrant antiresorptive therapy. Continue vitamin D supplementation. We will update vitamin D level. Bone density scan will be December 2023.    1. Rheumatoid arthritis involving multiple sites with positive rheumatoid factor (HCC)  -     XR HAND LEFT (MIN 3 VIEWS); Future  -     XR HAND RIGHT (MIN 3 VIEWS); Future  -     XR FOOT LEFT (MIN 3 VIEWS); Future  -     XR FOOT RIGHT (MIN 3 VIEWS); Future  2. Osteopenia of multiple sites  3. Generalized osteoarthritis      Return in about 6 months (around 6/28/2023). Subjective   SUBJECTIVE/OBJECTIVE:    HPI: Sindhu Camejo 1960 is a 57 y.o. female seen in follow-up for rheumatoid arthritis. My recommendation has been to start Humira but we held off because she is hesitant to start new medications, her exam was not overly impressive, inflammatory markers are only borderline and there is no damage on x-ray. Her biggest issue lately has been her back which is separate from rheumatoid arthritis. She is also noticed return of a bunion on the left foot. He has been having pain and swelling for the last couple days in the right second DIP joint only. She has had that joint fused in the past.  She has no extra-articular manifestations. Initial history: Patient had apparently seen 3 different rheumatologist in the past.  To thought she had rheumatoid arthritis, one did not. She was diagnosed about 10 years ago. She states at the time she was achy all over. 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 She has had multiple joint replacements including the left knee, left shoulder, both ankles. She has had her right first MCP joint fused in her right second DIP fused. She has had arthroscopic surgery on the right shoulder which is now giving her a little more trouble. She will ultimately need her right knee replaced as well. She has not seen a rheumatologist in 5 years actually. She was previously on Plaquenil and methotrexate but those did not help much.   She currently takes Aleve which helps to some degree. She has a strong family history of rheumatoid arthritis and does have a positive rheumatoid factor of 94. She has a negative SUSANA. Aside from the right shoulder and right knee her hands and wrists bother her but she also just feels generally achy. She does not really get a whole lot of joint swelling. She has no extra-articular manifestations. Past Medical History:   Diagnosis Date    Childhood asthma     Depression with anxiety     Diverticulitis     Fatigue     Fracture, ribs     10 11 12 ribs     History of stomach ulcers     IBS (irritable bowel syndrome)     Low vitamin B12 level     PTSD (post-traumatic stress disorder)     RA (rheumatoid arthritis) ([facility])     right ankle    RA    Weight gain         Review of Systems   Constitutional:  Negative for fatigue and fever. HENT:  Negative for mouth sores and trouble swallowing. Respiratory:  Negative for cough and shortness of breath. Cardiovascular:  Negative for chest pain. Gastrointestinal:  Negative for abdominal pain, diarrhea, nausea and vomiting. Genitourinary:  Negative for dysuria and hematuria. Musculoskeletal:  Positive for arthralgias. Negative for joint swelling. Skin:  Negative for color change and rash. Neurological:  Negative for weakness and numbness. Hematological:  Negative for adenopathy. All other systems reviewed and are negative. Objective   Vitals:    12/28/22 1415   BP: 124/76   Pulse: 74   Resp: 18   SpO2: 96%      Physical Exam  Constitutional:       General: She is not in acute distress. Appearance: Normal appearance. HENT:      Head: Normocephalic and atraumatic. Right Ear: External ear normal.      Left Ear: External ear normal.      Nose: Nose normal.   Eyes:      General: No scleral icterus. Pulmonary:      Effort: Pulmonary effort is normal.   Musculoskeletal:         General: Swelling, tenderness and deformity present.       Comments: She has Heberden's nodes and squaring of the first Aia 16 joints. There is some swelling and tenderness in the right second DIP joint. There is also surgical scarring from previous fusion. There is no obvious synovitis elsewhere on exam.   Skin:     General: Skin is warm and dry. Findings: No rash. Neurological:      General: No focal deficit present. Mental Status: She is alert and oriented to person, place, and time. Mental status is at baseline. Psychiatric:         Mood and Affect: Mood normal.         Behavior: Behavior normal.          Lab Results   Component Value Date    WBC 5.9 12/20/2022    HGB 12.2 12/20/2022    HCT 39.0 12/20/2022    MCV 90.3 12/20/2022     12/20/2022     Lab Results   Component Value Date     12/20/2022    K 4.2 12/20/2022     12/20/2022    CO2 28 12/20/2022    BUN 14 12/20/2022    CREATININE 0.7 12/20/2022    GLUCOSE 93 12/20/2022    CALCIUM 10.0 12/20/2022    PROT 7.7 12/20/2022    LABALBU 4.4 12/20/2022    BILITOT 0.3 12/20/2022    ALKPHOS 140 (H) 12/20/2022    AST 29 12/20/2022    ALT 28 12/20/2022    LABGLOM >60 12/20/2022    GFRAA >60 06/15/2022     Lab Results   Component Value Date    SUSANA NEGATIVE 12/20/2022     No results found for: RHEUMFACTOR  Lab Results   Component Value Date    SEDRATE 30 (H) 12/20/2022     Lab Results   Component Value Date    CRP 0.9 (H) 06/15/2022            An electronic signature was used to authenticate this note. This note was generated with a voice recognition dictation system. Please disregard any errors or omission which have escaped my review.     --Aneudy Madrid, DO

## 2022-12-29 ENCOUNTER — COMMUNITY OUTREACH (OUTPATIENT)
Dept: PRIMARY CARE CLINIC | Age: 62
End: 2022-12-29

## 2023-01-06 NOTE — PROGRESS NOTES
[facility] PRE-ADMISSION TESTING INSTRUCTIONS      ARRIVAL INSTRUCTIONS:  [x] Parking the day of Surgery is located in the Main Entrance lot. Upon entering the main door make an immediate right to the surgery reception desk. [x] Bring photo ID and insurance card    [] Bring in a copy of Living will or Durable Power of  papers. [x] Please be sure to arrange for responsible adult to provide transportation to and from the hospital    [x] Please arrange for responsible adult to be with you for the 24 hour period post procedure due to having anesthesia    [x] If you awake am of surgery not feeling well or have temperature >100 please call [phone]    GENERAL INSTRUCTIONS:    [x] Nothing by mouth after midnight, including gum, candy, mints or water    [x] You may brush your teeth, but do not swallow any water    [x] Take medications as instructed with 1-2 oz of water    [x] Stop herbal supplements and vitamins 5 days prior to procedure    [] Follow preop dosing of blood thinners per physician instructions    [] Take 1/2 dose of evening insulin, but no insulin after midnight    [] No oral diabetic medications after midnight    [] If diabetic and have low blood sugar or feel symptomatic, take 1-2oz apple juice only    [] Bring inhalers day of surgery    [] Bring C-PAP/ Bi-Pap day of surgery    [] Bring urine specimen day of surgery    [x] Shower or bath with soap, lather and rinse well, AM of Surgery, no lotion, powders or creams to surgical site    [x] Follow bowel prep as instructed per surgeon    [x] No tobacco products within 24 hours of surgery     [x] No alcohol or illegal drug use within 24 hours of surgery.     [x] Jewelry, body piercing's, eyeglasses, contact lenses and dentures are not permitted into surgery (bring cases)      [x] Please do not wear any nail polish, make up or hair products on the day of surgery    [x] You can expect a call the business day prior to procedure to notify you if your arrival time changes    [x] If you receive a survey after surgery we would greatly appreciate your comments    [x] Please notify surgeon if you develop any illness between now and time of surgery (cold, cough, sore throat, fever, nausea, vomiting) or any signs of infections  including skin, wounds, and dental.    [x]  The Outpatient Pharmacy is available to fill your prescription here on your day of surgery, ask your preop nurse for details

## 2023-01-10 ENCOUNTER — ANESTHESIA EVENT (OUTPATIENT)
Dept: ENDOSCOPY | Age: 63
End: 2023-01-10
Payer: MEDICARE

## 2023-01-11 ENCOUNTER — HOSPITAL ENCOUNTER (OUTPATIENT)
Age: 63
Setting detail: OUTPATIENT SURGERY
Discharge: HOME | End: 2023-01-11
Attending: SURGERY | Admitting: SURGERY
Payer: MEDICARE

## 2023-01-11 ENCOUNTER — ANESTHESIA (OUTPATIENT)
Dept: ENDOSCOPY | Age: 63
End: 2023-01-11
Payer: MEDICARE

## 2023-01-11 VITALS
DIASTOLIC BLOOD PRESSURE: 58 MMHG | SYSTOLIC BLOOD PRESSURE: 100 MMHG | HEIGHT: 64 IN | RESPIRATION RATE: 18 BRPM | TEMPERATURE: 96.7 F | BODY MASS INDEX: 30.73 KG/M2 | OXYGEN SATURATION: 96 % | HEART RATE: 64 BPM | WEIGHT: 180 LBS

## 2023-01-11 DIAGNOSIS — M51.16 HERNIATION OF LUMBAR INTERVERTEBRAL DISC WITH RADICULOPATHY: ICD-10-CM

## 2023-01-11 DIAGNOSIS — K21.9 GASTRIC REFLUX: ICD-10-CM

## 2023-01-11 DIAGNOSIS — Z12.11 SCREENING FOR COLON CANCER: ICD-10-CM

## 2023-01-11 PROCEDURE — 2580000003 HC RX 258: Performed by: SURGERY

## 2023-01-11 PROCEDURE — 3700000000 HC ANESTHESIA ATTENDED CARE: Performed by: SURGERY

## 2023-01-11 PROCEDURE — 6360000002 HC RX W HCPCS

## 2023-01-11 PROCEDURE — 2709999900 HC NON-CHARGEABLE SUPPLY: Performed by: SURGERY

## 2023-01-11 PROCEDURE — 7100000011 HC PHASE II RECOVERY - ADDTL 15 MIN: Performed by: SURGERY

## 2023-01-11 PROCEDURE — 7100000010 HC PHASE II RECOVERY - FIRST 15 MIN: Performed by: SURGERY

## 2023-01-11 PROCEDURE — 3700000001 HC ADD 15 MINUTES (ANESTHESIA): Performed by: SURGERY

## 2023-01-11 PROCEDURE — 88305 TISSUE EXAM BY PATHOLOGIST: CPT

## 2023-01-11 PROCEDURE — 3609027000 HC COLONOSCOPY: Performed by: SURGERY

## 2023-01-11 PROCEDURE — 3609012400 HC EGD TRANSORAL BIOPSY SINGLE/MULTIPLE: Performed by: SURGERY

## 2023-01-11 RX ORDER — SODIUM CHLORIDE 9 MG/ML
INJECTION, SOLUTION INTRAVENOUS CONTINUOUS
Status: DISCONTINUED | OUTPATIENT
Start: 2023-01-11 | End: 2023-01-11 | Stop reason: HOSPADM

## 2023-01-11 RX ORDER — PROPOFOL 10 MG/ML
INJECTION, EMULSION INTRAVENOUS PRN
Status: DISCONTINUED | OUTPATIENT
Start: 2023-01-11 | End: 2023-01-11 | Stop reason: SDUPTHER

## 2023-01-11 RX ADMIN — PROPOFOL 250 MG: 10 INJECTION, EMULSION INTRAVENOUS at 12:04

## 2023-01-11 RX ADMIN — SODIUM CHLORIDE: 9 INJECTION, SOLUTION INTRAVENOUS at 11:54

## 2023-01-11 ASSESSMENT — LIFESTYLE VARIABLES: SMOKING_STATUS: 0

## 2023-01-11 ASSESSMENT — PAIN SCALES - GENERAL: PAINLEVEL_OUTOF10: 0

## 2023-01-11 NOTE — OP NOTE
Operative Note      Patient: Sebastien Neff  YOB: 1960  MRN: [id number]    Date of Procedure: 1/11/2023    Pre-Op Diagnosis: Gastric reflux [K21.9]  Screening for colon cancer [Z12.11]    Post-Op Diagnosis: Same and rectal polyp, moderate diverticulosis       Procedure(s):  EGD BIOPSY  COLONOSCOPY POLYPECTOMY SNARE/COLD BIOPSY    Surgeon(s):  Wilver Torres MD    Assistant:   * No surgical staff found *    Anesthesia: Monitor Anesthesia Care    Estimated Blood Loss (mL): Minimal    Complications: None    Specimens:   ID Type Source Tests Collected by Time Destination   A : antral bx Tissue Stomach SURGICAL PATHOLOGY Wivler Torres MD 1/11/2023 1208    B : rectal polypectomy Tissue Colon SURGICAL PATHOLOGY Wilver Torres MD 1/11/2023 1238        Implants:  * No implants in log *      Drains: * No LDAs found *    Findings: EGD: normal gastric and duodenal mucosa, schiatzki's ring at GE junction with zline changes consistent with GERD. Cscope: moderate diverticulosis, thickening and narrowed sigmoid consistent with previous diverticulitis, rectal polyp    Detailed Description of Procedure:     UPPER ENDOSCOPY REPORT    HISTORY: The patient is a 57y.o. year old female with history of above preop diagnosis. I recommended esophagogastroduodenoscopy with possible biopsy and I explained the risk, benefits, expected outcome, and alternatives to the procedure. Risks included but are not limited to bleeding, infection, respiratory distress, hypotension, and perforation of the esophagus, stomach, or duodenum. Patient understands and is in agreement. PROCEDURE: The patient was connected to the monitors and given supplemental oxygen by nasal cannula. The patient was sedated. The gastroscope was inserted orally and advanced under direct vision through the esophagus, through the stomach, through the pylorus, and into the descending duodenum.       Findings:  Duodenum:     Descending: normal    Bulb: normal    Stomach:    Antrum: normal and biopsies were taken    Body: normal    Fundus: normal    Esophagus: mild esophagitis at distal esophagus, small schiatzki's ring @ GE junction    Larynx: normal    The scope was removed and the patient tolerated the procedure well. COLONOSCOPY PROCEDURE NOTE    PROCEDURE:  The patient was brought into the endoscopy suite and placed in the left lateral decubitus position. A digital rectal exam was performed after the initiation of LMAC anesthesia and failed to reveal any obstructing masses or lesions. A colonoscope was inserted into the patient's anus and passed through the rectum, sigmoid, descending, transverse, and ascending colon all the way to the level of the cecum. There was thickened and tight segment of sigmoid colon consistent with prior diverticulitis. Moderate diverticulosis was seen. Visualization of the cecum was confirmed by visualization of the ileo-cecal valve, confluence of the tinea, and by visualization of the light in the RLQ on the anterior abdominal wall. The scope was then withdrawn the entire length of the colon. There was a rectal polyp noted which was removed with snare polypectomy but specimen was not obtained from the scope. Otherwise no additional masses, polyps, or lesions noted. Upon reaching the anus, the scope was retroflexed. There were no significant hemorrhoids noted. The scope was straightened and withdrawn entirely. The patient tolerated the procedure well and there were no complications. Prep was good; repeat colonoscopy in 3 years. Dr. Theresa Bhandari was present and scrubbed for procedure.     IMPRESSION/PLAN:   GERD, continue PPI  Rectal polyp; history of polyps  Follow up with Dr. Dav Richter in 3 years for repeat colonoscopy      Electronically signed by Shan Osborne DO on 1/11/2023 at 12:44 PM

## 2023-01-11 NOTE — ANESTHESIA PRE PROCEDURE
Department of Anesthesiology  Preprocedure Note       Name:  Davina Kendrick   Age:  57 y.o.  :  1960                                          MRN:  [id number]         Date:  2023      Surgeon: Branden Tarango):  Seema Easley MD    Procedure: Procedure(s):  EGD ESOPHAGOGASTRODUODENOSCOPY    Medications prior to admission:   Prior to Admission medications    Medication Sig Start Date End Date Taking? Authorizing Provider   gabapentin (NEURONTIN) 300 MG capsule Take 1 capsule by mouth nightly. Intended supply: 30 days  Patient taking differently: Take 300 mg by mouth 2 times daily. 22  Alfredito Moore DO   FLUoxetine (PROZAC) 20 MG capsule Take 40 mg by mouth at bedtime    Historical Provider, MD   zolpidem (AMBIEN) 5 MG tablet Take 2.5 mg by mouth nightly as needed for Sleep. Historical Provider, MD   calcium carbonate (OSCAL) 500 MG TABS tablet Take 500 mg by mouth daily    Historical Provider, MD   Multiple Vitamins-Minerals (CENTRUM ADULTS PO) Take by mouth daily    Historical Provider, MD   [place]-3 1000 MG CAPS Take by mouth daily    Historical Provider, MD   Cholecalciferol (VITAMIN D) 50 MCG (2000 UT) CAPS capsule Take by mouth daily    Historical Provider, MD   vitamin E 1000 units capsule Take 1,000 Units by mouth daily    Historical Provider, MD   Turmeric 500 MG CAPS Take by mouth daily    Historical Provider, MD   Ascorbic Acid (VITAMIN C) 250 MG tablet Take 250 mg by mouth daily    Historical Provider, MD       Current medications:    No current facility-administered medications for this visit. No current outpatient medications on file. Facility-Administered Medications Ordered in Other Visits   Medication Dose Route Frequency Provider Last Rate Last Admin    0.9 % sodium chloride infusion   IntraVENous Continuous Seema Easley MD           Allergies:     Allergies   Allergen Reactions    Iodides      IV contrast during MRI \"burning during administration of contrast\"    Percocet [Oxycodone-Acetaminophen] Nausea And Vomiting and Other (See Comments)     Felt like \"bugs crawling on skin\"    Percodan [Oxycodone-Aspirin] Nausea And Vomiting       Problem List:    Patient Active Problem List   Diagnosis Code    Arthritis of left ankle M19.072    Rheumatoid arthritis ([facility] M06.9    Mild intermittent asthma without complication S00.02    Mixed stress and urge urinary incontinence N39.46    Primary osteoarthritis of left shoulder M19.012    Primary osteoarthritis of left knee M17.12    Change in vision H53.9    Elevated d-dimer R79.89    Primary osteoarthritis of right knee M17.11    Primary osteoarthritis involving multiple joints M15.9    Medication monitoring encounter Z51.81    Osteopenia of multiple sites M85.89    Chronic rhinitis J31.0    Acute infection of nasal sinus J01.90    Anxiety F41.9    Herniation of lumbar intervertebral disc with radiculopathy M51.16    Mixed hyperlipidemia E78.2       Past Medical History:        Diagnosis Date    Childhood asthma     Depression with anxiety     Diverticulitis     Fatigue     History of stomach ulcers     IBS (irritable bowel syndrome)     Low vitamin B12 level     PTSD (post-traumatic stress disorder)     RA (rheumatoid arthritis) ([facility] [address]     right ankle    RA    Weight gain        Past Surgical History:        Procedure Laterality Date    ACHILLES TENDON SURGERY Right 2/20/2019    RIGHT ACHILLES TENDON LENGTHENING performed by Tanya Interiano MD at [place] [address] Bilateral     CARPAL TUNNEL RELEASE Right     CARPAL TUNNEL RELEASE Right 06/2010    COLONOSCOPY  11/2012    severe TICS Sigmoid and Pan Tic     FOOT NEUROMA SURGERY      bilat    KNEE ARTHROSCOPY      left    OTHER SURGICAL HISTORY      excision neck mass    MA ARTHROPLASTY ANKLE W/IMPLANT Left 3/21/2018    LEFT TOTAL ANKLE REPLACEMENT LEFT FOOT CHEILECTOMY   [facility]) performed by Tanya Interiano MD at [facility]    SHOULDER ARTHROSCOPY Right     TONSILLECTOMY      TOTAL ANKLE ARTHROPLASTY Right 2019    RIGHT ANKLE TOTAL ARTHROPLASTY performed by Jaki Farrar MD at [address] [place] [address] ENDOSCOPY  2016    UPPER GASTROINTESTINAL ENDOSCOPY  2012    repeated  and improved        Social History:    Social History     Tobacco Use    Smoking status: Former     Packs/day: 0.25     Years: 10.00     Pack years: 2.50     Types: Cigarettes     Quit date: 2010     Years since quittin.1    Smokeless tobacco: Never   Substance Use Topics    Alcohol use: Yes     Comment: social                                Counseling given: Not Answered      Vital Signs (Current): There were no vitals filed for this visit.                                            BP Readings from Last 3 Encounters:   23 (!) 110/56   22 124/76   22 134/78       NPO Status:   water 1/10/23 2300                          Food 23 1930                                                                               BMI:   Wt Readings from Last 3 Encounters:   23 180 lb (81.6 kg)   22 183 lb (83 kg)   22 181 lb 9.6 oz (82.4 kg)     There is no height or weight on file to calculate BMI.    CBC:   Lab Results   Component Value Date/Time    WBC 5.9 2022 02:31 PM    RBC 4.32 2022 02:31 PM    HGB 12.2 2022 02:31 PM    HCT 39.0 2022 02:31 PM    MCV 90.3 2022 02:31 PM    RDW 14.0 2022 02:31 PM     2022 02:31 PM       CMP:   Lab Results   Component Value Date/Time     2022 02:31 PM    K 4.2 2022 02:31 PM    K 3.9 2021 06:04 PM     2022 02:31 PM    CO2 28 2022 02:31 PM    BUN 14 2022 02:31 PM    CREATININE 0.7 2022 02:31 PM    GFRAA >60 06/15/2022 03:00 PM    LABGLOM >60 2022 02:31 PM    GLUCOSE 93 2022 02:31 PM    PROT 7.7 2022 02:31 PM CALCIUM 10.0 12/20/2022 02:31 PM    BILITOT 0.3 12/20/2022 02:31 PM    ALKPHOS 140 12/20/2022 02:31 PM    AST 29 12/20/2022 02:31 PM    ALT 28 12/20/2022 02:31 PM       EKG 2/19/22 -   Sinus  Rhythm   WITHIN NORMAL LIMITS    POC Tests: No results for input(s): POCGLU, POCNA, POCK, POCCL, POCBUN, POCHEMO, POCHCT in the last 72 hours. Coags: No results found for: PROTIME, INR, APTT    HCG (If Applicable): No results found for: PREGTESTUR, PREGSERUM, HCG, HCGQUANT     ABGs: No results found for: PHART, PO2ART, PJP4HZZ, ELZ4EGO, BEART, P1FTAPUM     Type & Screen (If Applicable):  No results found for: LABABO, [address]    Anesthesia Evaluation  Patient summary reviewed and Nursing notes reviewed no history of anesthetic complications:   Airway: Mallampati: III  TM distance: >3 FB   Neck ROM: full  Mouth opening: > = 3 FB   Dental:          Pulmonary:normal exam  breath sounds clear to auscultation  (+) asthma (last inhaler use few years ago): allergic asthma,     (-) not a current smoker                          ROS comment: Former smoker   Cardiovascular:Negative CV ROS  Exercise tolerance: good (>4 METS),         ECG reviewed  Rhythm: regular  Rate: normal           Beta Blocker:  Not on Beta Blocker         Neuro/Psych:   (+) neuromuscular disease (lumbar disc herniation with right sided leg pain extending to knee):, psychiatric history (post-traumatic stress disorder): stable with treatmentdepression/anxiety             GI/Hepatic/Renal:   (+) hiatal hernia, GERD: poorly controlled, PUD, bowel prep,           Endo/Other:    (+) : arthritis: rheumatoid. , .                  ROS comment: Obesity  Medical marijuana - takes gummies - last use 3 days ago Abdominal:   (+) obese,           Vascular: negative vascular ROS. Other Findings:             Anesthesia Plan      MAC     ASA 3     (Pt agreeable to anesthesia plan. All questions answered. )  Induction: intravenous.       Anesthetic plan and risks discussed with patient. Use of blood products discussed with patient whom consented to blood products. Plan discussed with CRNA and attending.                 Meagan Christy RN   1/11/2023

## 2023-01-11 NOTE — ANESTHESIA POSTPROCEDURE EVALUATION
Department of Anesthesiology  Postprocedure Note    Patient: Jose Gerber  MRN: [id number]  YOB: 1960  Date of evaluation: 1/11/2023      Procedure Summary     Date: 01/11/23 Room / Location: [place] [address] / [facility]    Anesthesia Start: 2155 Anesthesia Stop: 1245    Procedures:       EGD BIOPSY      COLONOSCOPY POLYPECTOMY SNARE/COLD BIOPSY Diagnosis:       Gastric reflux      Screening for colon cancer      (Gastric reflux [K21.9])      (Screening for colon cancer [Z12.11])    Surgeons: Andrey Alanis MD Responsible Provider: Jovany Ruiz MD    Anesthesia Type: MAC ASA Status: 3          Anesthesia Type: No value filed. Arcenio Phase I: Arcenio Score: 10    Arcenio Phase II:        Anesthesia Post Evaluation    Patient location during evaluation: PACU  Patient participation: complete - patient participated  Level of consciousness: awake and alert  Pain score: 0  Airway patency: patent  Nausea & Vomiting: no nausea and no vomiting  Complications: no  Cardiovascular status: hemodynamically stable  Respiratory status: acceptable, spontaneous ventilation and room air  Hydration status: stable  Comments: Pt denies questions or needs at this time.    Multimodal analgesia pain management approach

## 2023-01-11 NOTE — DISCHARGE INSTRUCTIONS
Recommend repeat colonoscopy in 3 years. Colonoscopy: What to Expect at [address] [place] [address]  After a colonoscopy, you'll stay at the clinic until you wake up. Then you can go home. But you'll need to arrange for a ride. Your doctor will tell you when you can eat and do your other usual activities. Your doctor will talk to you about when you'll need your next colonoscopy. Your doctor can help you decide how often you need to be checked. This will depend on the results of your test and your risk for colorectal cancer. After the test, you may be bloated or have gas pains. You may need to pass gas. If a biopsy was done or a polyp was removed, you may have streaks of blood in your stool (feces) for a few days. Problems such as heavy rectal bleeding may not occur until several weeks after the test. This isn't common. But it can happen after polyps are removed. This care sheet gives you a general idea about how long it will take for you to recover. But each person recovers at a different pace. Follow the steps below to get better as quickly as possible. How can you care for yourself at home? Activity    Rest when you feel tired. You can do your normal activities when it feels okay to do so. Diet    Follow your doctor's directions for eating. Unless your doctor has told you not to, drink plenty of fluids. This helps to replace the fluids that were lost during the colon prep. Do not drink alcohol. Medicines    Your doctor will tell you if and when you can restart your medicines. You will also be given instructions about taking any new medicines. If you stopped taking aspirin or some other blood thinner, your doctor will tell you when to start taking it again. If polyps were removed or a biopsy was done during the test, your doctor may tell you not to take aspirin or other anti-inflammatory medicines for a few days. These include ibuprofen (Advil, Motrin) and naproxen (Aleve).    Other instructions    For your safety, do not drive or operate machinery until the medicine wears off and you can think clearly. Your doctor may tell you not to drive or operate machinery until the day after your test.     Do not sign legal documents or make major decisions until the medicine wears off and you can think clearly. The anesthesia can make it hard for you to fully understand what you are agreeing to. Follow-up care is a key part of your treatment and safety. Be sure to make and go to all appointments, and call your doctor if you are having problems. It's also a good idea to know your test results and keep a list of the medicines you take. When should you call for help? Call 911 anytime you think you may need emergency care. For example, call if:    You passed out (lost consciousness). You pass maroon or bloody stools. You have trouble breathing. Call your doctor now or seek immediate medical care if:    You have pain that does not get better after you take pain medicine. You are sick to your stomach or cannot drink fluids. You have new or worse belly pain. You have blood in your stools. You have a fever. You cannot pass stools or gas. Watch closely for changes in your health, and be sure to contact your doctor if you have any problems. Where can you learn more? Go to http://www.woods.com/ and enter E264 to learn more about \"Colonoscopy: What to Expect at Home. \"  Current as of: May 4, 2022               Content Version: 13.5  © 9233-8794 Healthwise, Incorporated. Care instructions adapted under license by [facility] ([facility]). If you have questions about a medical condition or this instruction, always ask your healthcare professional. [place] [address] any warranty or liability for your use of this information. Upper GI Endoscopy: What to Expect at [address] had an upper GI endoscopy.  Your doctor used a thin, lighted tube that bends to look at the inside of your esophagus, your stomach, and the first part of the small intestine, called the duodenum. After you have an endoscopy, you will stay at the hospital or clinic for 1 to 2 hours. This will allow the medicine to wear off. You will be able to go home after your doctor or nurse checks to make sure that you're not having any problems. You may have to stay overnight if you had treatment during the test. You may have a sore throat for a day or two after the test.  This care sheet gives you a general idea about what to expect after the test.  How can you care for yourself at home? Activity   Rest as much as you need to after you go home. You should be able to go back to your usual activities the day after the test.  Diet   Follow your doctor's directions for eating after the test.  Drink plenty of fluids (unless your doctor has told you not to). Medications   If you have a sore throat the day after the test, use an over-the-counter spray to numb your throat. Follow-up care is a key part of your treatment and safety. Be sure to make and go to all appointments, and call your doctor if you are having problems. It's also a good idea to know your test results and keep a list of the medicines you take. When should you call for help? Call 911 anytime you think you may need emergency care. For example, call if:    You passed out (lost consciousness). You have trouble breathing. You pass maroon or bloody stools. Call your doctor now or seek immediate medical care if:    You have pain that does not get better after your take pain medicine. You have new or worse belly pain. You have blood in your stools. You are sick to your stomach and cannot keep fluids down. You have a fever. You cannot pass stools or gas. Watch closely for changes in your health, and be sure to contact your doctor if:    Your throat still hurts after a day or two.      You do not get better as expected. Where can you learn more? Go to http://www.woods.com/ and enter J454 to learn more about \"Upper GI Endoscopy: What to Expect at Home. \"  Current as of: June 6, 2022               Content Version: 13.5  © 0785-9579 Healthwise, Incorporated. Care instructions adapted under license by [facility] ([facility]). If you have questions about a medical condition or this instruction, always ask your healthcare professional. [address] any warranty or liability for your use of this information.

## 2023-01-11 NOTE — H&P
Name: Gonzales Ibarra               : 1960 Sex: F  Age: 57 yrs  Acct#:  [id number]               Patient was referred by Charo Esquivel DO. Patient's primary care provider is Charo Esquivel DO.  CC: Patient presents for GERD     HPI: Presents with GERD. Recent hx numerous ortho procedures. Worsening GERD. Unknown last colonoscopy. Denied any BRBPR. Some constipation. Meds Prior to Visit:  Prozac     Ambien     Gabapentin     Prilosec  40 mg  take (1) capsule by mouth once every day. 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 FH:  Father:    Heart Disease. Mother:  Alive  Rheumatoid Arthritis  Hypertension  GI Disorder. Reviewed, no changes. SH:  Personal Habits:  Tobacco Use: Patient is a former smoker. Alcohol: Current Alcohol Use; Social.Drug Use: Denies Drug Use. Daily Caffeine: Uses Caffeine, Consumes on average 1 cup of regular coffee per day. Reviewed, no changes. Date: 2022  Was the patient queried about smoking behavior? Yes  No  Does the patient currently smoke? Tobacco Use: Patient is a former smoker.      ROS:  Const: Denies anorexia, anxiety, fatigue, night sweats, weight gain and weight loss. Eyes: Denies eye symptoms. ENMT: Denies ear symptoms. Denies nasal symptoms. Denies mouth or throat symptoms. CV: Denies hypertension and other cardiovascular symptoms. Resp: Reports other respiratory symptoms. GI: Reports other gastrointestinal symptoms, but denies hepatitis and liver disease. Musculo: Denies musculoskeletal symptoms. Skin: Denies skin, hair and nail symptoms. Breast: Denies breast problems. Neuro: Denies neurologic symptoms. Psych: Reports depression, but denies substance abuse. Endocrine: Denies diabetes, kidney disease and thyroid disease. Hema/Lymph: Denies anemia, blood disease, cancer and past transfusion. Allergy/Immuno: Reports rheumatoid arthritis, but denies immunosuppression. Reviewed, no changes. Wt Prior: 186lb as of 03/28/16     Exam:  Const: Appears healthy and well developed. No signs of apparent distress present. Head/Face: Atraumatic, normocephalic on inspection. Eyes: Conjunctivae pink. Pupils equal, round and reactive to light. Sclerae clear and anicteric. ENMT: External ears WNL. External nose WNL. Lips: Appear normal and healthy. Neck: Normal to inspection. Normal to palpation. No masses appreciated. Trachea midline. Thyroid is normal in size. No jugular venous distention. Resp: Respiration rate is normal. No wheezing. Clear to auscultation bilaterally. No rales or rhonchi appreciated over the lungs bilaterally. CV: Rate is regular. Rhythm is regular. S1 is normal. S2 is normal. No heart murmur appreciated. Extremities: No clubbing or cyanosis. No edema of the lower limbs bilaterally. Abdomen: No visible herniations. No abdominal scars. Positive bowel sounds in all quadrants. Abdomen is soft, nontender, and nondistended without guarding, rigidity or rebound tenderness. No palpable abdominal aneurysms present. No palpable hepatosplenomegaly.   Lymph: No palpable lymphadenopathy in the cervical, supraclavicular, axillary and inguinal region(s). Musculo: Walks with a normal gait. Upper Extremities: Normal to inspection. ROM: Full ROM bilaterally. Lower Extremities: Normal to inspection. ROM: Full ROM bilaterally. Skin: Skin warm and dry with no evidence of unusual rashes or suspicious lesions. Neuro: Alert and oriented x3. Mood is normal.  Cranial Nerves: Cranial nerves II-XII grossly intact. Psych: Cognition: Judgment and insight are grossly intact.                     Assessment #1: Hx Z12.11 Encounter for screening for malignant neoplasm of colon   Care Plan:              Comments       :  Screening colonoscopy  Risks, benefits, alternatives, complications, need for barium enema as well as colonic perforation all discussed      Assessment #2: Hx K21.9 Gastro-esophageal reflux disease without esophagitis   Care Plan:              Comments       :  worsening GERD     Risks, benefits, alternatives, complications all discussed  Decision-making was moderate in complexity  Approximately 30 minutes spent reviewing information, data provided/obtained prior to office visit, obtaining information (history and physical) during office visit, in order to coordinate care regarding goals of care, diagnosis and prognosis        Electronically signed by Jinny White DO on 1/11/2023 at 11:50 AM

## 2023-04-19 ENCOUNTER — OFFICE VISIT (OUTPATIENT)
Dept: FAMILY MEDICINE CLINIC | Age: 63
End: 2023-04-19

## 2023-04-19 VITALS
TEMPERATURE: 97.7 F | WEIGHT: 182 LBS | HEIGHT: 64 IN | SYSTOLIC BLOOD PRESSURE: 130 MMHG | HEART RATE: 70 BPM | OXYGEN SATURATION: 96 % | BODY MASS INDEX: 31.07 KG/M2 | DIASTOLIC BLOOD PRESSURE: 74 MMHG | RESPIRATION RATE: 20 BRPM

## 2023-04-19 DIAGNOSIS — M62.830 BACK MUSCLE SPASM: Primary | ICD-10-CM

## 2023-04-19 RX ORDER — METHYLPREDNISOLONE ACETATE 40 MG/ML
40 INJECTION, SUSPENSION INTRA-ARTICULAR; INTRALESIONAL; INTRAMUSCULAR; SOFT TISSUE ONCE
Status: COMPLETED | OUTPATIENT
Start: 2023-04-19 | End: 2023-04-19

## 2023-04-19 RX ORDER — METHYLPREDNISOLONE 4 MG/1
TABLET ORAL
Qty: 1 KIT | Refills: 0 | Status: SHIPPED | OUTPATIENT
Start: 2023-04-19

## 2023-04-19 RX ORDER — TIZANIDINE 4 MG/1
4 TABLET ORAL 4 TIMES DAILY PRN
Qty: 20 TABLET | Refills: 0 | Status: SHIPPED | OUTPATIENT
Start: 2023-04-19

## 2023-04-19 RX ADMIN — METHYLPREDNISOLONE ACETATE 40 MG: 40 INJECTION, SUSPENSION INTRA-ARTICULAR; INTRALESIONAL; INTRAMUSCULAR; SOFT TISSUE at 13:26

## 2023-04-19 NOTE — PROGRESS NOTES
methylPREDNISolone (MEDROL DOSEPACK) 4 MG tablet; Take by mouth. -     tiZANidine (ZANAFLEX) 4 MG tablet; Take 1 tablet by mouth 4 times daily as needed (back pain)        The patient is to call for any concerns or return if any of the signs or symptoms worsen. The patient is to follow-up with PCP in the next 2-3 days for repeat evaluation repeat assessment or go directly to the emergency department.      SIGNATURE: Lida Larsen III, PA-C

## 2023-05-25 ENCOUNTER — OFFICE VISIT (OUTPATIENT)
Dept: FAMILY MEDICINE CLINIC | Age: 63
End: 2023-05-25

## 2023-05-25 VITALS
HEIGHT: 64 IN | OXYGEN SATURATION: 97 % | HEART RATE: 92 BPM | WEIGHT: 182 LBS | SYSTOLIC BLOOD PRESSURE: 122 MMHG | DIASTOLIC BLOOD PRESSURE: 70 MMHG | RESPIRATION RATE: 20 BRPM | TEMPERATURE: 97.4 F | BODY MASS INDEX: 31.07 KG/M2

## 2023-05-25 DIAGNOSIS — H61.23 IMPACTED CERUMEN OF BOTH EARS: Primary | ICD-10-CM

## 2023-05-25 DIAGNOSIS — R09.82 PND (POST-NASAL DRIP): ICD-10-CM

## 2023-05-25 RX ORDER — PREDNISONE 20 MG/1
20 TABLET ORAL 2 TIMES DAILY
Qty: 10 TABLET | Refills: 0 | Status: SHIPPED | OUTPATIENT
Start: 2023-05-25 | End: 2023-05-30

## 2023-05-25 RX ORDER — CETIRIZINE HYDROCHLORIDE 1 MG/ML
5 SOLUTION ORAL DAILY
Qty: 118 ML | Refills: 0 | Status: SHIPPED | OUTPATIENT
Start: 2023-05-25

## 2023-05-25 ASSESSMENT — ENCOUNTER SYMPTOMS
COUGH: 0
SINUS PRESSURE: 0
BACK PAIN: 0
WHEEZING: 0
SORE THROAT: 0
RHINORRHEA: 1
EYE REDNESS: 0
NAUSEA: 0
EYE PAIN: 0
EYE DISCHARGE: 0
DIARRHEA: 0
SHORTNESS OF BREATH: 0
VOMITING: 0
ABDOMINAL DISTENTION: 0

## 2023-05-25 NOTE — PROGRESS NOTES
92   Temp 97.4 °F (36.3 °C) (Temporal)   Resp 20   Ht 5' 4\" (1.626 m)   Wt 182 lb (82.6 kg)   SpO2 97%   BMI 31.24 kg/m²    Oxygen Saturation Interpretation: Normal.    Physical Exam  Vitals and nursing note reviewed. Constitutional:       Appearance: She is well-developed. HENT:      Head: Normocephalic and atraumatic. Right Ear: Hearing and external ear normal. There is impacted cerumen. Left Ear: Hearing and external ear normal. There is impacted cerumen. Nose: Rhinorrhea present. Mouth/Throat:      Pharynx: Uvula midline. Eyes:      General: Lids are normal.      Conjunctiva/sclera: Conjunctivae normal.      Pupils: Pupils are equal, round, and reactive to light. Cardiovascular:      Rate and Rhythm: Normal rate and regular rhythm. Heart sounds: Normal heart sounds. No murmur heard. Pulmonary:      Effort: Pulmonary effort is normal.      Breath sounds: Normal breath sounds. Abdominal:      General: Bowel sounds are normal.      Palpations: Abdomen is soft. Abdomen is not rigid. Tenderness: There is no abdominal tenderness. There is no guarding or rebound. Musculoskeletal:      Cervical back: Normal range of motion and neck supple. Skin:     General: Skin is warm and dry. Findings: No abrasion or rash. Neurological:      Mental Status: She is alert and oriented to person, place, and time. GCS: GCS eye subscore is 4. GCS verbal subscore is 5. GCS motor subscore is 6. Cranial Nerves: No cranial nerve deficit. Sensory: No sensory deficit. Coordination: Coordination normal.      Gait: Gait normal.       Test Results Section   (All laboratory and radiology results have been personally reviewed by myself)  Labs:  No results found for this visit on 05/25/23. Imaging: All Radiology results interpreted by Radiologist unless otherwise noted. No results found.       Assessment / Plan   Impression(s):  Yesi was seen today for

## 2023-05-31 NOTE — TELEPHONE ENCOUNTER
Notify the patient MRI does show some narrowing of the spinal cord at the low back area. But see what Dr. Boston Heads has to see. Make sure she takes a copy of the MRI with her to the visit. Mail copy to patient. LR @ 100/hr

## 2023-06-15 DIAGNOSIS — R09.82 PND (POST-NASAL DRIP): ICD-10-CM

## 2023-06-15 RX ORDER — CETIRIZINE HCL 1 MG/ML
SOLUTION, ORAL ORAL
Qty: 118 ML | Refills: 5 | Status: SHIPPED | OUTPATIENT
Start: 2023-06-15

## 2023-06-27 ENCOUNTER — OFFICE VISIT (OUTPATIENT)
Dept: RHEUMATOLOGY | Age: 63
End: 2023-06-27
Payer: MEDICARE

## 2023-06-27 VITALS — WEIGHT: 180 LBS | BODY MASS INDEX: 30.73 KG/M2 | HEIGHT: 64 IN

## 2023-06-27 DIAGNOSIS — D84.9 IMMUNOSUPPRESSION (HCC): ICD-10-CM

## 2023-06-27 DIAGNOSIS — M15.9 GENERALIZED OSTEOARTHRITIS: ICD-10-CM

## 2023-06-27 DIAGNOSIS — Z79.899 HIGH RISK MEDICATION USE: ICD-10-CM

## 2023-06-27 DIAGNOSIS — M05.79 RHEUMATOID ARTHRITIS INVOLVING MULTIPLE SITES WITH POSITIVE RHEUMATOID FACTOR (HCC): Chronic | ICD-10-CM

## 2023-06-27 DIAGNOSIS — M85.89 OSTEOPENIA OF MULTIPLE SITES: ICD-10-CM

## 2023-06-27 DIAGNOSIS — M05.79 RHEUMATOID ARTHRITIS INVOLVING MULTIPLE SITES WITH POSITIVE RHEUMATOID FACTOR (HCC): Primary | Chronic | ICD-10-CM

## 2023-06-27 LAB
ALBUMIN SERPL-MCNC: 5 G/DL (ref 3.5–5.2)
ALP SERPL-CCNC: 122 U/L (ref 35–104)
ALT SERPL-CCNC: 19 U/L (ref 0–32)
ANION GAP SERPL CALCULATED.3IONS-SCNC: 10 MMOL/L (ref 7–16)
AST SERPL-CCNC: 21 U/L (ref 0–31)
BASOPHILS # BLD: 0.05 E9/L (ref 0–0.2)
BASOPHILS NFR BLD: 0.7 % (ref 0–2)
BILIRUB SERPL-MCNC: 0.2 MG/DL (ref 0–1.2)
BUN SERPL-MCNC: 15 MG/DL (ref 6–23)
CALCIUM SERPL-MCNC: 10 MG/DL (ref 8.6–10.2)
CHLORIDE SERPL-SCNC: 101 MMOL/L (ref 98–107)
CO2 SERPL-SCNC: 28 MMOL/L (ref 22–29)
CREAT SERPL-MCNC: 0.7 MG/DL (ref 0.5–1)
CRP SERPL HS-MCNC: 0.6 MG/DL (ref 0–0.4)
EOSINOPHIL # BLD: 0.24 E9/L (ref 0.05–0.5)
EOSINOPHIL NFR BLD: 3.4 % (ref 0–6)
ERYTHROCYTE [DISTWIDTH] IN BLOOD BY AUTOMATED COUNT: 15.3 FL (ref 11.5–15)
GLUCOSE SERPL-MCNC: 94 MG/DL (ref 74–99)
HCT VFR BLD AUTO: 42.4 % (ref 34–48)
HGB BLD-MCNC: 13.1 G/DL (ref 11.5–15.5)
IMM GRANULOCYTES # BLD: 0.02 E9/L
IMM GRANULOCYTES NFR BLD: 0.3 % (ref 0–5)
LYMPHOCYTES # BLD: 2.31 E9/L (ref 1.5–4)
LYMPHOCYTES NFR BLD: 32.6 % (ref 20–42)
MCH RBC QN AUTO: 28.5 PG (ref 26–35)
MCHC RBC AUTO-ENTMCNC: 30.9 % (ref 32–34.5)
MCV RBC AUTO: 92.2 FL (ref 80–99.9)
MONOCYTES # BLD: 0.61 E9/L (ref 0.1–0.95)
MONOCYTES NFR BLD: 8.6 % (ref 2–12)
NEUTROPHILS # BLD: 3.85 E9/L (ref 1.8–7.3)
NEUTS SEG NFR BLD: 54.4 % (ref 43–80)
PLATELET # BLD AUTO: 229 E9/L (ref 130–450)
PMV BLD AUTO: 12.3 FL (ref 7–12)
POTASSIUM SERPL-SCNC: 3.9 MMOL/L (ref 3.5–5)
PROT SERPL-MCNC: 8 G/DL (ref 6.4–8.3)
RBC # BLD AUTO: 4.6 E12/L (ref 3.5–5.5)
SODIUM SERPL-SCNC: 139 MMOL/L (ref 132–146)
WBC # BLD: 7.1 E9/L (ref 4.5–11.5)

## 2023-06-27 PROCEDURE — 99215 OFFICE O/P EST HI 40 MIN: CPT | Performed by: INTERNAL MEDICINE

## 2023-06-27 RX ORDER — LEFLUNOMIDE 20 MG/1
20 TABLET ORAL DAILY
Qty: 30 TABLET | Refills: 5 | Status: SHIPPED | OUTPATIENT
Start: 2023-06-27

## 2023-06-27 ASSESSMENT — ENCOUNTER SYMPTOMS
VOMITING: 0
TROUBLE SWALLOWING: 0
DIARRHEA: 0
COUGH: 0
COLOR CHANGE: 0
NAUSEA: 0
ABDOMINAL PAIN: 0
SHORTNESS OF BREATH: 0

## 2023-06-28 LAB — ERYTHROCYTE [SEDIMENTATION RATE] IN BLOOD BY WESTERGREN METHOD: 22 MM/HR (ref 0–20)

## 2023-07-18 ENCOUNTER — TELEPHONE (OUTPATIENT)
Dept: RHEUMATOLOGY | Age: 63
End: 2023-07-18

## 2023-07-18 ENCOUNTER — OFFICE VISIT (OUTPATIENT)
Dept: RHEUMATOLOGY | Age: 63
End: 2023-07-18
Payer: MEDICARE

## 2023-07-18 VITALS — WEIGHT: 180 LBS | HEIGHT: 64 IN | BODY MASS INDEX: 30.73 KG/M2

## 2023-07-18 DIAGNOSIS — D84.9 IMMUNOSUPPRESSION (HCC): ICD-10-CM

## 2023-07-18 DIAGNOSIS — Z79.899 HIGH RISK MEDICATION USE: ICD-10-CM

## 2023-07-18 DIAGNOSIS — M05.79 RHEUMATOID ARTHRITIS INVOLVING MULTIPLE SITES WITH POSITIVE RHEUMATOID FACTOR (HCC): Chronic | ICD-10-CM

## 2023-07-18 DIAGNOSIS — M05.79 RHEUMATOID ARTHRITIS INVOLVING MULTIPLE SITES WITH POSITIVE RHEUMATOID FACTOR (HCC): Primary | Chronic | ICD-10-CM

## 2023-07-18 DIAGNOSIS — M85.89 OSTEOPENIA OF MULTIPLE SITES: ICD-10-CM

## 2023-07-18 DIAGNOSIS — M15.9 GENERALIZED OSTEOARTHRITIS: ICD-10-CM

## 2023-07-18 LAB
ABSOLUTE EOS #: 0.26 K/UL (ref 0.05–0.5)
ABSOLUTE IMMATURE GRANULOCYTE: <0.03 K/UL (ref 0–0.58)
ABSOLUTE LYMPH #: 1.59 K/UL (ref 1.5–4)
ABSOLUTE MONO #: 0.44 K/UL (ref 0.1–0.95)
ALBUMIN SERPL-MCNC: 4.5 G/DL (ref 3.5–5.2)
ALP BLD-CCNC: 136 U/L (ref 35–104)
ALT SERPL-CCNC: 23 U/L (ref 0–32)
ANION GAP SERPL CALCULATED.3IONS-SCNC: 15 MMOL/L (ref 7–16)
AST SERPL-CCNC: 27 U/L (ref 0–31)
BASOPHILS # BLD: 1 % (ref 0–2)
BASOPHILS ABSOLUTE: 0.05 K/UL (ref 0–0.2)
BILIRUB SERPL-MCNC: 0.3 MG/DL (ref 0–1.2)
BUN BLDV-MCNC: 10 MG/DL (ref 6–23)
C-REACTIVE PROTEIN: 6 MG/L (ref 0–5)
CALCIUM SERPL-MCNC: 9.5 MG/DL (ref 8.6–10.2)
CHLORIDE BLD-SCNC: 104 MMOL/L (ref 98–107)
CO2: 22 MMOL/L (ref 22–29)
CREAT SERPL-MCNC: 0.6 MG/DL (ref 0.5–1)
EOSINOPHILS RELATIVE PERCENT: 5 % (ref 0–6)
GFR SERPL CREATININE-BSD FRML MDRD: >60 ML/MIN/1.73M2
GLUCOSE BLD-MCNC: 103 MG/DL (ref 74–99)
HCT VFR BLD CALC: 41.5 % (ref 34–48)
HEMOGLOBIN: 12.9 G/DL (ref 11.5–15.5)
IMMATURE GRANULOCYTES: 0 % (ref 0–5)
LYMPHOCYTES # BLD: 28 % (ref 20–42)
MCH RBC QN AUTO: 28.6 PG (ref 26–35)
MCHC RBC AUTO-ENTMCNC: 31.1 G/DL (ref 32–34.5)
MCV RBC AUTO: 92 FL (ref 80–99.9)
MONOCYTES # BLD: 8 % (ref 2–12)
PDW BLD-RTO: 14.3 % (ref 11.5–15)
PLATELET # BLD: 186 K/UL (ref 130–450)
PMV BLD AUTO: 12.4 FL (ref 7–12)
POTASSIUM SERPL-SCNC: 4 MMOL/L (ref 3.5–5)
RBC # BLD: 4.51 M/UL (ref 3.5–5.5)
SEDIMENTATION RATE, ERYTHROCYTE: 24 MM/HR (ref 0–20)
SEG NEUTROPHILS: 59 % (ref 43–80)
SEGMENTED NEUTROPHILS ABSOLUTE COUNT: 3.37 K/UL (ref 1.8–7.3)
SODIUM BLD-SCNC: 141 MMOL/L (ref 132–146)
TOTAL PROTEIN: 7.4 G/DL (ref 6.4–8.3)
WBC # BLD: 5.7 K/UL (ref 4.5–11.5)

## 2023-07-18 PROCEDURE — 1123F ACP DISCUSS/DSCN MKR DOCD: CPT | Performed by: INTERNAL MEDICINE

## 2023-07-18 PROCEDURE — 99215 OFFICE O/P EST HI 40 MIN: CPT | Performed by: INTERNAL MEDICINE

## 2023-07-18 RX ORDER — PREDNISONE 5 MG/1
15 TABLET ORAL DAILY
Qty: 90 TABLET | Refills: 1 | Status: SHIPPED | OUTPATIENT
Start: 2023-07-18

## 2023-07-18 ASSESSMENT — ENCOUNTER SYMPTOMS
TROUBLE SWALLOWING: 0
SHORTNESS OF BREATH: 0
VOMITING: 0
COUGH: 0
COLOR CHANGE: 0
DIARRHEA: 0
NAUSEA: 0
ABDOMINAL PAIN: 0

## 2023-07-18 NOTE — TELEPHONE ENCOUNTER
Pt said that she started an new med when seen last month and she has been having a lot of pain and fatigue. She would like to be seen as soon as possible. Please advise on scheduling.

## 2023-07-18 NOTE — PROGRESS NOTES
Cl Crawford 1960 is a 70 y.o. female, here for evaluation of the following chief complaint(s):  Follow-up (Patient here because she feels that the new medication is not helping. )         ASSESSMENT/PLAN:    Cl Crawford 1960 is a 70 y.o. female seen in follow-up for rheumatoid arthritis. 1.  RF positive, CCP positive, nonerosive positive rheumatoid arthritis-  She has failed Plaquenil and methotrexate in the past.  She has been hesitant to start Biologics so last visit we started her on [address]. She is only been on it for about 3 weeks but is having more joint pain and swelling in the hands. She has obvious active synovitis. She has not been on Areva long enough to be getting much effect. We will put her on prednisone 15 mg daily for 2 weeks, then 10 mg daily for 2 weeks, then 5 mg daily for 2 weeks. If at next visit she is still having active synovitis we will have to revisit the idea of Biologics. 2.  Osteoarthritis-either way she has underlying osteoarthritis. She can continue NSAIDs on a as needed basis. She has had both knees replaced and her left shoulder replaced. She has also been seeing pain management for spinal stenosis. Rheumatoid arthritis spares the back. 3.   Osteopenia-FRAX is not elevated to warrant antiresorptive therapy. Continue vitamin D supplementation. We will update vitamin D level. Bone density scan will be December 2023.    4.  Immunosuppression-I recommend she stay up-to-date on vaccinations. In the event of any acute infectious illness she should hold [address]. 5.  High risk medication use-we will monitor basic blood work monthly for the first 3 months on [address]. We will monitor for infection. 1. Rheumatoid arthritis involving multiple sites with positive rheumatoid factor (HCC)  -     CBC with Auto Differential; Future  -     Comprehensive Metabolic Panel; Future  -     C-Reactive Protein; Future  -     Sedimentation Rate; Future  2.

## 2023-07-18 NOTE — PATIENT INSTRUCTIONS
Take prednisone 3 tabs daily for 2 weeks, then 2 tabs daily for 2 weeks, then one tab daily for 2 weeks, then stop    Have blood work monthly

## 2023-07-18 NOTE — TELEPHONE ENCOUNTER
Spoke with patient - she states that she hurts all over, very fatigued and is not sure if the med is helping. Patient coming in today for evaluation.   Electronically signed by Xiomara Swenson CMA on 7/18/2023 at 9:49 AM

## 2023-07-19 RX ORDER — LEFLUNOMIDE 20 MG/1
TABLET ORAL
Qty: 30 TABLET | Refills: 5 | OUTPATIENT
Start: 2023-07-19

## 2023-08-18 ENCOUNTER — HOSPITAL ENCOUNTER (OUTPATIENT)
Age: 63
Discharge: HOME | End: 2023-08-18
Payer: MEDICARE

## 2023-08-18 DIAGNOSIS — D84.9 IMMUNOSUPPRESSION (HCC): ICD-10-CM

## 2023-08-18 DIAGNOSIS — M05.79 RHEUMATOID ARTHRITIS INVOLVING MULTIPLE SITES WITH POSITIVE RHEUMATOID FACTOR (HCC): Chronic | ICD-10-CM

## 2023-08-18 DIAGNOSIS — Z79.899 HIGH RISK MEDICATION USE: ICD-10-CM

## 2023-08-18 LAB
ALBUMIN SERPL-MCNC: 4.4 G/DL (ref 3.5–5.2)
ALP SERPL-CCNC: 129 U/L (ref 35–104)
ALT SERPL-CCNC: 23 U/L (ref 0–32)
ANION GAP SERPL CALCULATED.3IONS-SCNC: 8 MMOL/L (ref 7–16)
AST SERPL-CCNC: 23 U/L (ref 0–31)
BASOPHILS # BLD: 0.04 K/UL (ref 0–0.2)
BASOPHILS NFR BLD: 1 % (ref 0–2)
BILIRUB SERPL-MCNC: 0.2 MG/DL (ref 0–1.2)
BUN SERPL-MCNC: 16 MG/DL (ref 6–23)
CALCIUM SERPL-MCNC: 9.6 MG/DL (ref 8.6–10.2)
CHLORIDE SERPL-SCNC: 101 MMOL/L (ref 98–107)
CO2 SERPL-SCNC: 30 MMOL/L (ref 22–29)
CREAT SERPL-MCNC: 0.8 MG/DL (ref 0.5–1)
EOSINOPHIL # BLD: 0.3 K/UL (ref 0.05–0.5)
EOSINOPHILS RELATIVE PERCENT: 6 % (ref 0–6)
ERYTHROCYTE [DISTWIDTH] IN BLOOD BY AUTOMATED COUNT: 14.1 % (ref 11.5–15)
GFR SERPL CREATININE-BSD FRML MDRD: >60 ML/MIN/1.73M2
GLUCOSE SERPL-MCNC: 87 MG/DL (ref 74–99)
HCT VFR BLD AUTO: 40 % (ref 34–48)
HGB BLD-MCNC: 12.6 G/DL (ref 11.5–15.5)
IMM GRANULOCYTES # BLD AUTO: <0.03 K/UL (ref 0–0.58)
IMM GRANULOCYTES NFR BLD: 0 % (ref 0–5)
LYMPHOCYTES NFR BLD: 1.58 K/UL (ref 1.5–4)
LYMPHOCYTES RELATIVE PERCENT: 30 % (ref 20–42)
MCH RBC QN AUTO: 28.9 PG (ref 26–35)
MCHC RBC AUTO-ENTMCNC: 31.5 G/DL (ref 32–34.5)
MCV RBC AUTO: 91.7 FL (ref 80–99.9)
MONOCYTES NFR BLD: 0.5 K/UL (ref 0.1–0.95)
MONOCYTES NFR BLD: 10 % (ref 2–12)
NEUTROPHILS NFR BLD: 54 % (ref 43–80)
NEUTS SEG NFR BLD: 2.83 K/UL (ref 1.8–7.3)
PLATELET # BLD AUTO: 216 K/UL (ref 130–450)
PMV BLD AUTO: 12.1 FL (ref 7–12)
POTASSIUM SERPL-SCNC: 3.8 MMOL/L (ref 3.5–5)
PROT SERPL-MCNC: 7.1 G/DL (ref 6.4–8.3)
RBC # BLD AUTO: 4.36 M/UL (ref 3.5–5.5)
SODIUM SERPL-SCNC: 139 MMOL/L (ref 132–146)
WBC OTHER # BLD: 5.3 K/UL (ref 4.5–11.5)

## 2023-08-18 PROCEDURE — 80053 COMPREHEN METABOLIC PANEL: CPT

## 2023-08-18 PROCEDURE — 85025 COMPLETE CBC W/AUTO DIFF WBC: CPT

## 2023-09-11 DIAGNOSIS — M05.79 RHEUMATOID ARTHRITIS INVOLVING MULTIPLE SITES WITH POSITIVE RHEUMATOID FACTOR (HCC): Primary | ICD-10-CM

## 2023-09-11 RX ORDER — PREDNISONE 5 MG/1
15 TABLET ORAL DAILY
Qty: 90 TABLET | Refills: 1 | OUTPATIENT
Start: 2023-09-11

## 2023-09-14 ENCOUNTER — OFFICE VISIT (OUTPATIENT)
Dept: RHEUMATOLOGY | Age: 63
End: 2023-09-14
Payer: MEDICARE

## 2023-09-14 VITALS — BODY MASS INDEX: 30.73 KG/M2 | HEIGHT: 64 IN | WEIGHT: 180 LBS

## 2023-09-14 DIAGNOSIS — Z79.899 HIGH RISK MEDICATION USE: ICD-10-CM

## 2023-09-14 DIAGNOSIS — M85.89 OSTEOPENIA OF MULTIPLE SITES: ICD-10-CM

## 2023-09-14 DIAGNOSIS — M15.9 GENERALIZED OSTEOARTHRITIS: ICD-10-CM

## 2023-09-14 DIAGNOSIS — M05.79 RHEUMATOID ARTHRITIS INVOLVING MULTIPLE SITES WITH POSITIVE RHEUMATOID FACTOR (HCC): Primary | Chronic | ICD-10-CM

## 2023-09-14 DIAGNOSIS — M05.79 RHEUMATOID ARTHRITIS INVOLVING MULTIPLE SITES WITH POSITIVE RHEUMATOID FACTOR (HCC): Chronic | ICD-10-CM

## 2023-09-14 DIAGNOSIS — D84.9 IMMUNOSUPPRESSION (HCC): ICD-10-CM

## 2023-09-14 LAB
ABSOLUTE IMMATURE GRANULOCYTE: <0.03 K/UL (ref 0–0.58)
ALT SERPL-CCNC: 32 U/L (ref 0–32)
AST SERPL-CCNC: 32 U/L (ref 0–31)
BASOPHILS ABSOLUTE: 0.04 K/UL (ref 0–0.2)
BASOPHILS RELATIVE PERCENT: 1 % (ref 0–2)
C-REACTIVE PROTEIN: 6 MG/L (ref 0–5)
CREAT SERPL-MCNC: 0.7 MG/DL (ref 0.5–1)
EOSINOPHILS ABSOLUTE: 0.26 K/UL (ref 0.05–0.5)
EOSINOPHILS RELATIVE PERCENT: 5 % (ref 0–6)
GFR SERPL CREATININE-BSD FRML MDRD: >60 ML/MIN/1.73M2
HCT VFR BLD CALC: 40.6 % (ref 34–48)
HEMOGLOBIN: 12.5 G/DL (ref 11.5–15.5)
IMMATURE GRANULOCYTES: 0 % (ref 0–5)
LYMPHOCYTES ABSOLUTE: 1.53 K/UL (ref 1.5–4)
LYMPHOCYTES RELATIVE PERCENT: 32 % (ref 20–42)
MCH RBC QN AUTO: 28.6 PG (ref 26–35)
MCHC RBC AUTO-ENTMCNC: 30.8 G/DL (ref 32–34.5)
MCV RBC AUTO: 92.9 FL (ref 80–99.9)
MONOCYTES ABSOLUTE: 0.58 K/UL (ref 0.1–0.95)
MONOCYTES RELATIVE PERCENT: 12 % (ref 2–12)
NEUTROPHILS ABSOLUTE: 2.43 K/UL (ref 1.8–7.3)
NEUTROPHILS RELATIVE PERCENT: 50 % (ref 43–80)
PDW BLD-RTO: 14.6 % (ref 11.5–15)
PLATELET, FLUORESCENCE: 179 K/UL (ref 130–450)
PMV BLD AUTO: 13.1 FL (ref 7–12)
RBC # BLD: 4.37 M/UL (ref 3.5–5.5)
SEDIMENTATION RATE, ERYTHROCYTE: 27 MM/HR (ref 0–20)
WBC # BLD: 4.9 K/UL (ref 4.5–11.5)

## 2023-09-14 PROCEDURE — 99215 OFFICE O/P EST HI 40 MIN: CPT | Performed by: INTERNAL MEDICINE

## 2023-09-14 PROCEDURE — 1123F ACP DISCUSS/DSCN MKR DOCD: CPT | Performed by: INTERNAL MEDICINE

## 2023-09-14 RX ORDER — LEFLUNOMIDE 20 MG/1
20 TABLET ORAL DAILY
Qty: 30 TABLET | Refills: 5 | Status: SHIPPED | OUTPATIENT
Start: 2023-09-14

## 2023-09-14 ASSESSMENT — ENCOUNTER SYMPTOMS
ABDOMINAL PAIN: 0
COLOR CHANGE: 0
SHORTNESS OF BREATH: 0
NAUSEA: 0
VOMITING: 0
DIARRHEA: 0
COUGH: 0
TROUBLE SWALLOWING: 0

## 2023-09-14 NOTE — PROGRESS NOTES
Immunosuppression ([address])  3. High risk medication use  4. Generalized osteoarthritis  5. Osteopenia of multiple sites        Return in about 4 months (around 1/14/2024). Subjective   SUBJECTIVE/OBJECTIVE:    HPI: Meena Ornelas 1960 is a 70 y.o. female seen in follow-up for rheumatoid arthritis. Last visit we started her on Areva and a prednisone taper. She has since tapered off of prednisone but it was beneficial.  She still has hand pain and stiffness. She has no extra-articular manifestations. Initial history: Patient had apparently seen 3 different rheumatologist in the past.  To thought she had rheumatoid arthritis, one did not. She was diagnosed about 10 years ago. She states at the time she was achy all over. 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 She has had multiple joint replacements including the left knee, left shoulder, both ankles. She has had her right first MCP joint fused in her right second DIP fused. She has had arthroscopic surgery on the right shoulder which is now giving her a little more trouble. She will ultimately need her right knee replaced as well. She has not seen a rheumatologist in 5 years actually. She was previously on Plaquenil and methotrexate but those did not help much. She currently takes Aleve which helps to some degree. She has a strong family history of rheumatoid arthritis and does have a positive rheumatoid factor of 94. She has a negative SUSANA. Aside from the right shoulder and right knee her hands and wrists bother her but she also just feels generally achy. She does not really get a whole lot of joint swelling. She has no extra-articular manifestations.     Past Medical History:   Diagnosis Date    Childhood asthma     Depression with anxiety     Diverticulitis     Fatigue     History of stomach ulcers     IBS (irritable bowel syndrome)     Low vitamin B12 level     PTSD (post-traumatic stress

## 2023-11-16 ENCOUNTER — OFFICE VISIT (OUTPATIENT)
Dept: PRIMARY CARE CLINIC | Age: 63
End: 2023-11-16

## 2023-11-16 VITALS
DIASTOLIC BLOOD PRESSURE: 78 MMHG | BODY MASS INDEX: 30.83 KG/M2 | TEMPERATURE: 97.3 F | HEIGHT: 64 IN | SYSTOLIC BLOOD PRESSURE: 134 MMHG | WEIGHT: 180.6 LBS

## 2023-11-16 DIAGNOSIS — Z23 IMMUNIZATION DUE: ICD-10-CM

## 2023-11-16 DIAGNOSIS — E78.2 MIXED HYPERLIPIDEMIA: ICD-10-CM

## 2023-11-16 DIAGNOSIS — M05.79 RHEUMATOID ARTHRITIS INVOLVING MULTIPLE SITES WITH POSITIVE RHEUMATOID FACTOR (HCC): Chronic | ICD-10-CM

## 2023-11-16 DIAGNOSIS — J45.20 MILD INTERMITTENT ASTHMA WITHOUT COMPLICATION: Chronic | ICD-10-CM

## 2023-11-16 DIAGNOSIS — Z00.00 MEDICARE ANNUAL WELLNESS VISIT, SUBSEQUENT: Primary | ICD-10-CM

## 2023-11-16 ASSESSMENT — PATIENT HEALTH QUESTIONNAIRE - PHQ9
2. FEELING DOWN, DEPRESSED OR HOPELESS: 0
SUM OF ALL RESPONSES TO PHQ QUESTIONS 1-9: 0
1. LITTLE INTEREST OR PLEASURE IN DOING THINGS: 0
SUM OF ALL RESPONSES TO PHQ9 QUESTIONS 1 & 2: 0
SUM OF ALL RESPONSES TO PHQ QUESTIONS 1-9: 0

## 2023-11-16 ASSESSMENT — LIFESTYLE VARIABLES: HOW MANY STANDARD DRINKS CONTAINING ALCOHOL DO YOU HAVE ON A TYPICAL DAY: PATIENT DOES NOT DRINK

## 2023-11-16 NOTE — PROGRESS NOTES
Medicare Annual Wellness Visit    Justin Thakur is here for Medicare AWV    Assessment & Plan   Medicare annual wellness visit, subsequent  Rheumatoid arthritis involving multiple sites with positive rheumatoid factor ([address])  Mixed hyperlipidemia  Mild intermittent asthma without complication    Recommendations for Preventive Services Due: see orders and patient instructions/AVS.  Recommended screening schedule for the next 5-10 years is provided to the patient in written form: see Patient Instructions/AVS.     No follow-ups on file. Subjective   The following acute and/or chronic problems were also addressed today:  Ck  re     arth lwo  back pain   sleep     RA  Feel ok   seeing  rheum and  left    fifth  figner  sl sore       Sh eask abotu plastic d=srug for face      I educated the patient about all medications. Make sure they were correct and educated  on the risk associated with missing meds or taking them incorrectly. A list of medications is being sent home with patient today. Check blood pressure at home twice a day. Low-salt low caffeine diet. Call if systolic blood pressure is above 211 and diastolic blood pressures above 85. Only use a upper arm digital cuff. A great deal of time spent reviewing medications, diet, exercise, social issues. Also reviewing the chart before entering the room with patient and finishing charting after leaving patient's room. More than half of that time was spent face to face with the patient in counseling and coordinating care. Aggressive low-fat diet. Avoid red meats, greasy fried foods, dairy products. Avoid processed foods. Take cholesterol medications without food. I informed patient about the risk associated with noncompliance of medication and taking medications incorrectly. Appropriate follow-up with myself and all specialist.  Encourage family members to take active role in assisting with medications and medical care.   If any confusion should

## 2023-11-17 ENCOUNTER — TELEPHONE (OUTPATIENT)
Dept: RHEUMATOLOGY | Age: 63
End: 2023-11-17

## 2023-11-17 RX ORDER — METHYLPREDNISOLONE 4 MG/1
TABLET ORAL
Qty: 1 KIT | Refills: 0 | Status: SHIPPED | OUTPATIENT
Start: 2023-11-17

## 2023-11-17 NOTE — TELEPHONE ENCOUNTER
Patient is notified of dr's message and patient was advised/instruced to follow up with her PCP and or Orthopedic provider while Dr. Gela Frias is out of office on extended leave.     Electronically signed by Coy Quiñonez, [email or web address] on 11/17/2023 at 12:54 PM

## 2023-11-17 NOTE — TELEPHONE ENCOUNTER
Patient called office stating that she is having a bad flare up of the RA in her hands. Duration of symptoms x1 week now. Patient states that her little fingers are causing most pain, both hands are swollen. Patient states that she has about 20 tablets of 5mg prednisone at home. Please advise.         Electronically signed by Iam Granados, [address] on 11/17/2023 at 12:18 PM

## 2023-11-28 ENCOUNTER — TELEPHONE (OUTPATIENT)
Dept: PRIMARY CARE CLINIC | Age: 63
End: 2023-11-28

## 2023-11-28 NOTE — TELEPHONE ENCOUNTER
Pt calling in asking for a script for a new handicap plaque. Please give the pt a call when this is ready.

## 2023-12-14 ENCOUNTER — TELEPHONE (OUTPATIENT)
Dept: RHEUMATOLOGY | Age: 63
End: 2023-12-14

## 2023-12-14 NOTE — TELEPHONE ENCOUNTER
I called and left a detailed message on the patient's phone informing that there is already a standing lab order for her that Dr. Virginia Koch ordered from her September appointment, and that she is able to get her next set of blood work done this month. I also voiced on the message that there will be a delay in the blood work being reviewed as we are relying on an outside source provider to review the blood work, but will call patient once addressed.       Electronically signed by Myah Knox, [address] on 12/14/2023 at 2:07 PM

## 2024-01-10 DIAGNOSIS — D84.9 IMMUNOSUPPRESSION (HCC): ICD-10-CM

## 2024-01-10 DIAGNOSIS — M05.79 RHEUMATOID ARTHRITIS INVOLVING MULTIPLE SITES WITH POSITIVE RHEUMATOID FACTOR (HCC): Primary | ICD-10-CM

## 2024-01-11 RX ORDER — LEFLUNOMIDE 20 MG/1
20 TABLET ORAL DAILY
Qty: 90 TABLET | Refills: 5 | Status: SHIPPED | OUTPATIENT
Start: 2024-01-11

## 2024-03-18 ENCOUNTER — OFFICE VISIT (OUTPATIENT)
Dept: RHEUMATOLOGY | Age: 64
End: 2024-03-18
Payer: MEDICARE

## 2024-03-18 VITALS — WEIGHT: 180 LBS | HEIGHT: 64 IN | BODY MASS INDEX: 30.73 KG/M2

## 2024-03-18 DIAGNOSIS — Z79.899 HIGH RISK MEDICATION USE: ICD-10-CM

## 2024-03-18 DIAGNOSIS — D84.9 IMMUNOSUPPRESSION (HCC): ICD-10-CM

## 2024-03-18 DIAGNOSIS — M05.79 RHEUMATOID ARTHRITIS INVOLVING MULTIPLE SITES WITH POSITIVE RHEUMATOID FACTOR (HCC): Primary | Chronic | ICD-10-CM

## 2024-03-18 DIAGNOSIS — M85.89 OSTEOPENIA OF MULTIPLE SITES: ICD-10-CM

## 2024-03-18 DIAGNOSIS — M15.9 GENERALIZED OSTEOARTHRITIS: ICD-10-CM

## 2024-03-18 PROCEDURE — 99214 OFFICE O/P EST MOD 30 MIN: CPT | Performed by: INTERNAL MEDICINE

## 2024-03-18 PROCEDURE — 1123F ACP DISCUSS/DSCN MKR DOCD: CPT | Performed by: INTERNAL MEDICINE

## 2024-03-18 ASSESSMENT — ENCOUNTER SYMPTOMS
TROUBLE SWALLOWING: 0
VOMITING: 0
COUGH: 0
SHORTNESS OF BREATH: 0
NAUSEA: 0
ABDOMINAL PAIN: 0
DIARRHEA: 0
COLOR CHANGE: 0

## 2024-03-18 NOTE — PATIENT INSTRUCTIONS
No new medications for now    Have blood work, Xrays and bone density scan    Dr. Ramos has ordered a radiology test for you such as \"Bone Density Scan, MRI, CT, etc\". [facility] Radiology Scheduling Department should contact you within 1-2 weeks to schedule your appointment. If you do not hear from the scheduling department, please contact them at [phone]. If any questions, please call Dr. Ramos's office if needed at [phone]. Thank you.

## 2024-03-18 NOTE — PROGRESS NOTES
extra-articular manifestations.    Initial history: Patient had apparently seen 3 different rheumatologist in the past.  To thought she had rheumatoid arthritis, one did not.  She was diagnosed about 10 years ago.  She states at the time she was achy all over.  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  She has had multiple joint replacements including the left knee, left shoulder, both ankles.  She has had her right first MCP joint fused in her right second DIP fused.  She has had arthroscopic surgery on the right shoulder which is now giving her a little more trouble.  She will ultimately need her right knee replaced as well.  She has not seen a rheumatologist in 5 years actually.  She was previously on Plaquenil and methotrexate but those did not help much.  She currently takes Aleve which helps to some degree.  She has a strong family history of rheumatoid arthritis and does have a positive rheumatoid factor of 94.  She has a negative SUSANA.  Aside from the right shoulder and right knee her hands and wrists bother her but she also just feels generally achy.  She does not really get a whole lot of joint swelling.  She has no extra-articular manifestations.    Past Medical History:   Diagnosis Date    Childhood asthma     Depression with anxiety     Diverticulitis     Fatigue     History of stomach ulcers     IBS (irritable bowel syndrome)     Low vitamin B12 level     PTSD (post-traumatic stress disorder)     RA (rheumatoid arthritis) (HCC)     right ankle    RA    Weight gain         Review of Systems   Constitutional:  Positive for fatigue. Negative for fever.   HENT:  Negative for mouth sores and trouble swallowing.    Respiratory:  Negative for cough and shortness of breath.    Cardiovascular:  Negative for chest pain.   Gastrointestinal:  Negative for abdominal pain, diarrhea, nausea and vomiting.   Genitourinary:  Negative for dysuria and hematuria.   Musculoskeletal:

## 2024-03-25 ENCOUNTER — TELEPHONE (OUTPATIENT)
Dept: ADMINISTRATIVE | Age: 64
End: 2024-03-25

## 2024-03-25 NOTE — TELEPHONE ENCOUNTER
Called patient to address her question, she stated that she has not heard from scheduling to make an appointment for the bone density.   Scheduling phone number provided to the patient to call and arrange her appointment.      Electronically signed by Bárbara Moran CMA on 3/25/2024 at 1:15 PM

## 2024-03-27 ENCOUNTER — OFFICE VISIT (OUTPATIENT)
Dept: PRIMARY CARE CLINIC | Age: 64
End: 2024-03-27

## 2024-03-27 VITALS
HEIGHT: 62 IN | SYSTOLIC BLOOD PRESSURE: 135 MMHG | TEMPERATURE: 99 F | DIASTOLIC BLOOD PRESSURE: 82 MMHG | BODY MASS INDEX: 34.04 KG/M2 | WEIGHT: 185 LBS

## 2024-03-27 DIAGNOSIS — R73.01 IMPAIRED FASTING GLUCOSE: ICD-10-CM

## 2024-03-27 DIAGNOSIS — M81.0 AGE-RELATED OSTEOPOROSIS WITHOUT CURRENT PATHOLOGICAL FRACTURE: ICD-10-CM

## 2024-03-27 DIAGNOSIS — Z00.00 MEDICARE ANNUAL WELLNESS VISIT, SUBSEQUENT: Primary | ICD-10-CM

## 2024-03-27 DIAGNOSIS — E78.2 MIXED HYPERLIPIDEMIA: ICD-10-CM

## 2024-03-27 DIAGNOSIS — M05.79 RHEUMATOID ARTHRITIS INVOLVING MULTIPLE SITES WITH POSITIVE RHEUMATOID FACTOR (HCC): Chronic | ICD-10-CM

## 2024-03-27 DIAGNOSIS — E53.8 VITAMIN B 12 DEFICIENCY: ICD-10-CM

## 2024-03-27 DIAGNOSIS — E03.9 ACQUIRED HYPOTHYROIDISM: ICD-10-CM

## 2024-03-27 SDOH — ECONOMIC STABILITY: HOUSING INSECURITY
IN THE LAST 12 MONTHS, WAS THERE A TIME WHEN YOU DID NOT HAVE A STEADY PLACE TO SLEEP OR SLEPT IN A SHELTER (INCLUDING NOW)?: NO

## 2024-03-27 SDOH — ECONOMIC STABILITY: FOOD INSECURITY: WITHIN THE PAST 12 MONTHS, THE FOOD YOU BOUGHT JUST DIDN'T LAST AND YOU DIDN'T HAVE MONEY TO GET MORE.: NEVER TRUE

## 2024-03-27 SDOH — ECONOMIC STABILITY: INCOME INSECURITY: HOW HARD IS IT FOR YOU TO PAY FOR THE VERY BASICS LIKE FOOD, HOUSING, MEDICAL CARE, AND HEATING?: SOMEWHAT HARD

## 2024-03-27 SDOH — HEALTH STABILITY: PHYSICAL HEALTH: ON AVERAGE, HOW MANY DAYS PER WEEK DO YOU ENGAGE IN MODERATE TO STRENUOUS EXERCISE (LIKE A BRISK WALK)?: 3 DAYS

## 2024-03-27 SDOH — HEALTH STABILITY: PHYSICAL HEALTH: ON AVERAGE, HOW MANY MINUTES DO YOU ENGAGE IN EXERCISE AT THIS LEVEL?: 30 MIN

## 2024-03-27 SDOH — ECONOMIC STABILITY: FOOD INSECURITY: WITHIN THE PAST 12 MONTHS, YOU WORRIED THAT YOUR FOOD WOULD RUN OUT BEFORE YOU GOT MONEY TO BUY MORE.: NEVER TRUE

## 2024-03-27 SDOH — ECONOMIC STABILITY: TRANSPORTATION INSECURITY
IN THE PAST 12 MONTHS, HAS LACK OF TRANSPORTATION KEPT YOU FROM MEETINGS, WORK, OR FROM GETTING THINGS NEEDED FOR DAILY LIVING?: NO

## 2024-03-27 ASSESSMENT — PATIENT HEALTH QUESTIONNAIRE - PHQ9
1. LITTLE INTEREST OR PLEASURE IN DOING THINGS: SEVERAL DAYS
SUM OF ALL RESPONSES TO PHQ QUESTIONS 1-9: 2
SUM OF ALL RESPONSES TO PHQ9 QUESTIONS 1 & 2: 2
2. FEELING DOWN, DEPRESSED OR HOPELESS: SEVERAL DAYS

## 2024-03-27 ASSESSMENT — LIFESTYLE VARIABLES
HOW MANY STANDARD DRINKS CONTAINING ALCOHOL DO YOU HAVE ON A TYPICAL DAY: 1
HOW OFTEN DURING THE LAST YEAR HAVE YOU FOUND THAT YOU WERE NOT ABLE TO STOP DRINKING ONCE YOU HAD STARTED: NEVER
HOW OFTEN DO YOU HAVE A DRINK CONTAINING ALCOHOL: 2-3 TIMES A WEEK
HOW OFTEN DO YOU HAVE A DRINK CONTAINING ALCOHOL: 4
HOW OFTEN DURING THE LAST YEAR HAVE YOU FAILED TO DO WHAT WAS NORMALLY EXPECTED FROM YOU BECAUSE OF DRINKING: NEVER
HOW OFTEN DURING THE LAST YEAR HAVE YOU NEEDED AN ALCOHOLIC DRINK FIRST THING IN THE MORNING TO GET YOURSELF GOING AFTER A NIGHT OF HEAVY DRINKING: NEVER
HOW OFTEN DURING THE LAST YEAR HAVE YOU BEEN UNABLE TO REMEMBER WHAT HAPPENED THE NIGHT BEFORE BECAUSE YOU HAD BEEN DRINKING: NEVER
HOW OFTEN DURING THE LAST YEAR HAVE YOU FAILED TO DO WHAT WAS NORMALLY EXPECTED FROM YOU BECAUSE OF DRINKING: NEVER
HAVE YOU OR SOMEONE ELSE BEEN INJURED AS A RESULT OF YOUR DRINKING: NO
HAS A RELATIVE, FRIEND, DOCTOR, OR ANOTHER HEALTH PROFESSIONAL EXPRESSED CONCERN ABOUT YOUR DRINKING OR SUGGESTED YOU CUT DOWN: NO
HOW OFTEN DURING THE LAST YEAR HAVE YOU NEEDED AN ALCOHOLIC DRINK FIRST THING IN THE MORNING TO GET YOURSELF GOING AFTER A NIGHT OF HEAVY DRINKING: NEVER
HAS A RELATIVE, FRIEND, DOCTOR, OR ANOTHER HEALTH PROFESSIONAL EXPRESSED CONCERN ABOUT YOUR DRINKING OR SUGGESTED YOU CUT DOWN: NO
HOW OFTEN DURING THE LAST YEAR HAVE YOU FOUND THAT YOU WERE NOT ABLE TO STOP DRINKING ONCE YOU HAD STARTED: NEVER
HOW OFTEN DURING THE LAST YEAR HAVE YOU HAD A FEELING OF GUILT OR REMORSE AFTER DRINKING: NEVER
HOW OFTEN DURING THE LAST YEAR HAVE YOU BEEN UNABLE TO REMEMBER WHAT HAPPENED THE NIGHT BEFORE BECAUSE YOU HAD BEEN DRINKING: NEVER
HOW MANY STANDARD DRINKS CONTAINING ALCOHOL DO YOU HAVE ON A TYPICAL DAY: 1 OR 2
HOW OFTEN DURING THE LAST YEAR HAVE YOU HAD A FEELING OF GUILT OR REMORSE AFTER DRINKING: NEVER
HAVE YOU OR SOMEONE ELSE BEEN INJURED AS A RESULT OF YOUR DRINKING: NO
HOW OFTEN DO YOU HAVE SIX OR MORE DRINKS ON ONE OCCASION: 2

## 2024-03-27 NOTE — PROGRESS NOTES
Alfredito HANLEY DO as PCP - General  Alfredito Moore DO as PCP - Empaneled Provider  Brody Ashraf MD as Surgeon (General Surgery)  Adal Gallagher MD     Reviewed and updated this visit:  Tobacco  Allergies  Med Hx  Surg Hx  Soc Hx  Fam Hx         Lab soon    call aftr  diet exer hm issues   fuwith orhto    See me for pre op elizabeth silverio mo before or    I educated the patient about all medications.  Make sure they were correct and educated  on the risk associated with missing meds or taking them incorrectly.  A list of medications is being sent home with patient today.    Check blood pressure at home twice a day.  Low-salt low caffeine diet.  Call if systolic blood pressure is above 150 and diastolic blood pressures above 85.  Only use a upper arm digital cuff.  A great deal of time spent reviewing medications, diet, exercise, social issues. Also reviewing the chart before entering the room with patient and finishing charting after leaving patient's room. More than half of that time was spent face to face with the patient in counseling and coordinating care.  Aggressive low-fat diet.  Avoid red meats, greasy fried foods, dairy products.  Avoid processed foods.  Take cholesterol medications without food.  I informed patient about the risk associated with noncompliance of medication and taking medications incorrectly.  Appropriate follow-up with myself and all specialist.  Encourage family members to take active role in assisting with medications and medical care.  If any confusion should develop to notify my office immediately to avoid risk of worsening medical condition

## 2024-03-27 NOTE — PATIENT INSTRUCTIONS
Taste food before salting. Add only a little salt when you think you need it. With time, your taste buds will adjust to less salt.     Eat fewer snack items, fast foods, canned soups, and other high-salt, high-fat, processed foods.     Read food labels and try to avoid saturated and trans fats. They increase your risk of heart disease by raising cholesterol levels.     Limit the amount of solid fat--butter, margarine, and shortening--you eat. Use olive, peanut, or canola oil when you cook. Bake, broil, and steam foods instead of frying them.     Eat a variety of fruit and vegetables every day. Dark green, deep orange, red, or yellow fruits and vegetables are especially good for you. Examples include spinach, carrots, peaches, and berries.     Foods high in fiber can reduce your cholesterol and provide important vitamins and minerals. High-fiber foods include whole-grain cereals and breads, oatmeal, beans, brown rice, citrus fruits, and apples.     Eat lean proteins. Heart-healthy proteins include seafood, lean meats and poultry, eggs, beans, peas, nuts, seeds, and soy products.     Limit drinks and foods with added sugar. These include candy, desserts, and soda pop.   Heart-healthy lifestyle    If your doctor recommends it, get more exercise. For many people, walking is a good choice. Or you may want to swim, bike, or do other activities. Bit by bit, increase the time you're active every day. Try for at least 30 minutes on most days of the week.     Try to quit or cut back on using tobacco and other nicotine products. This includes smoking and vaping. If you need help quitting, talk to your doctor about stop-smoking programs and medicines. These can increase your chances of quitting for good. Quitting is one of the most important things you can do to protect your heart. It is never too late to quit. Try to avoid secondhand smoke too.     Stay at a weight that's healthy for you. Talk to your doctor if you need help

## 2024-04-01 ENCOUNTER — HOSPITAL ENCOUNTER (OUTPATIENT)
Age: 64
Discharge: HOME | End: 2024-04-01
Payer: MEDICARE

## 2024-04-01 ENCOUNTER — HOSPITAL ENCOUNTER (OUTPATIENT)
Dept: GENERAL RADIOLOGY | Age: 64
Discharge: HOME | End: 2024-04-03
Payer: MEDICARE

## 2024-04-01 ENCOUNTER — HOSPITAL ENCOUNTER (OUTPATIENT)
Age: 64
Discharge: HOME | End: 2024-04-03
Payer: MEDICARE

## 2024-04-01 ENCOUNTER — HOSPITAL ENCOUNTER (OUTPATIENT)
Dept: MAMMOGRAPHY | Age: 64
Discharge: HOME | End: 2024-04-03
Payer: MEDICARE

## 2024-04-01 DIAGNOSIS — E03.9 ACQUIRED HYPOTHYROIDISM: ICD-10-CM

## 2024-04-01 DIAGNOSIS — D84.9 IMMUNOSUPPRESSION (HCC): ICD-10-CM

## 2024-04-01 DIAGNOSIS — R73.01 IMPAIRED FASTING GLUCOSE: ICD-10-CM

## 2024-04-01 DIAGNOSIS — Z79.899 HIGH RISK MEDICATION USE: ICD-10-CM

## 2024-04-01 DIAGNOSIS — E53.8 VITAMIN B 12 DEFICIENCY: ICD-10-CM

## 2024-04-01 DIAGNOSIS — M05.79 RHEUMATOID ARTHRITIS INVOLVING MULTIPLE SITES WITH POSITIVE RHEUMATOID FACTOR (HCC): Chronic | ICD-10-CM

## 2024-04-01 DIAGNOSIS — M81.0 AGE-RELATED OSTEOPOROSIS WITHOUT CURRENT PATHOLOGICAL FRACTURE: ICD-10-CM

## 2024-04-01 DIAGNOSIS — M85.89 OSTEOPENIA OF MULTIPLE SITES: ICD-10-CM

## 2024-04-01 DIAGNOSIS — E78.2 MIXED HYPERLIPIDEMIA: ICD-10-CM

## 2024-04-01 LAB
25(OH)D3 SERPL-MCNC: 45.9 NG/ML (ref 30–100)
ALBUMIN SERPL-MCNC: 4.5 G/DL (ref 3.5–5.2)
ALP SERPL-CCNC: 110 U/L (ref 35–104)
ALT SERPL-CCNC: 18 U/L (ref 0–32)
ALT SERPL-CCNC: 18 U/L (ref 0–32)
ANION GAP SERPL CALCULATED.3IONS-SCNC: 9 MMOL/L (ref 7–16)
AST SERPL-CCNC: 20 U/L (ref 0–31)
AST SERPL-CCNC: 21 U/L (ref 0–31)
BASOPHILS # BLD: 0.05 K/UL (ref 0–0.2)
BASOPHILS NFR BLD: 1 % (ref 0–2)
BILIRUB SERPL-MCNC: 0.3 MG/DL (ref 0–1.2)
BILIRUB UR QL STRIP: NEGATIVE
BUN SERPL-MCNC: 11 MG/DL (ref 6–23)
CALCIUM SERPL-MCNC: 9.3 MG/DL (ref 8.6–10.2)
CHLORIDE SERPL-SCNC: 103 MMOL/L (ref 98–107)
CHOLEST SERPL-MCNC: 261 MG/DL
CLARITY UR: CLEAR
CO2 SERPL-SCNC: 27 MMOL/L (ref 22–29)
COLOR UR: YELLOW
CREAT SERPL-MCNC: 0.6 MG/DL (ref 0.5–1)
CREAT SERPL-MCNC: 0.7 MG/DL (ref 0.5–1)
CRP SERPL HS-MCNC: 4 MG/L (ref 0–5)
EOSINOPHIL # BLD: 0.25 K/UL (ref 0.05–0.5)
EOSINOPHILS RELATIVE PERCENT: 5 % (ref 0–6)
ERYTHROCYTE [DISTWIDTH] IN BLOOD BY AUTOMATED COUNT: 14.2 % (ref 11.5–15)
ERYTHROCYTE [SEDIMENTATION RATE] IN BLOOD BY WESTERGREN METHOD: 11 MM/HR (ref 0–20)
GFR SERPL CREATININE-BSD FRML MDRD: >90 ML/MIN/1.73M2
GFR SERPL CREATININE-BSD FRML MDRD: >90 ML/MIN/1.73M2
GLUCOSE SERPL-MCNC: 87 MG/DL (ref 74–99)
GLUCOSE UR STRIP-MCNC: NEGATIVE MG/DL
HBA1C MFR BLD: 5.1 % (ref 4–5.6)
HCT VFR BLD AUTO: 42.2 % (ref 34–48)
HDLC SERPL-MCNC: 87 MG/DL
HGB BLD-MCNC: 12.8 G/DL (ref 11.5–15.5)
HGB UR QL STRIP.AUTO: ABNORMAL
IMM GRANULOCYTES # BLD AUTO: <0.03 K/UL (ref 0–0.58)
IMM GRANULOCYTES NFR BLD: 0 % (ref 0–5)
KETONES UR STRIP-MCNC: NEGATIVE MG/DL
LDLC SERPL CALC-MCNC: 139 MG/DL
LEUKOCYTE ESTERASE UR QL STRIP: NEGATIVE
LYMPHOCYTES NFR BLD: 1.38 K/UL (ref 1.5–4)
LYMPHOCYTES RELATIVE PERCENT: 30 % (ref 20–42)
MCH RBC QN AUTO: 28 PG (ref 26–35)
MCHC RBC AUTO-ENTMCNC: 30.3 G/DL (ref 32–34.5)
MCV RBC AUTO: 92.3 FL (ref 80–99.9)
MONOCYTES NFR BLD: 0.44 K/UL (ref 0.1–0.95)
MONOCYTES NFR BLD: 10 % (ref 2–12)
NEUTROPHILS NFR BLD: 54 % (ref 43–80)
NEUTS SEG NFR BLD: 2.51 K/UL (ref 1.8–7.3)
NITRITE UR QL STRIP: NEGATIVE
PH UR STRIP: 6 [PH] (ref 5–9)
PLATELET # BLD AUTO: 185 K/UL (ref 130–450)
PMV BLD AUTO: 12.1 FL (ref 7–12)
POTASSIUM SERPL-SCNC: 4.3 MMOL/L (ref 3.5–5)
PROT SERPL-MCNC: 7.3 G/DL (ref 6.4–8.3)
PROT UR STRIP-MCNC: NEGATIVE MG/DL
RBC # BLD AUTO: 4.57 M/UL (ref 3.5–5.5)
RBC #/AREA URNS HPF: NORMAL /HPF
SODIUM SERPL-SCNC: 139 MMOL/L (ref 132–146)
SP GR UR STRIP: 1.02 (ref 1–1.03)
T4 SERPL-MCNC: 6.1 UG/DL (ref 4.5–11.7)
TRIGL SERPL-MCNC: 173 MG/DL
UROBILINOGEN UR STRIP-ACNC: 0.2 EU/DL (ref 0–1)
VIT B12 SERPL-MCNC: 346 PG/ML (ref 211–946)
VLDLC SERPL CALC-MCNC: 35 MG/DL
WBC #/AREA URNS HPF: NORMAL /HPF
WBC OTHER # BLD: 4.6 K/UL (ref 4.5–11.5)

## 2024-04-01 PROCEDURE — 80053 COMPREHEN METABOLIC PANEL: CPT

## 2024-04-01 PROCEDURE — 81001 URINALYSIS AUTO W/SCOPE: CPT

## 2024-04-01 PROCEDURE — 85652 RBC SED RATE AUTOMATED: CPT

## 2024-04-01 PROCEDURE — 36415 COLL VENOUS BLD VENIPUNCTURE: CPT

## 2024-04-01 PROCEDURE — 73130 X-RAY EXAM OF HAND: CPT

## 2024-04-01 PROCEDURE — 73630 X-RAY EXAM OF FOOT: CPT

## 2024-04-01 PROCEDURE — 84460 ALANINE AMINO (ALT) (SGPT): CPT

## 2024-04-01 PROCEDURE — 85025 COMPLETE CBC W/AUTO DIFF WBC: CPT

## 2024-04-01 PROCEDURE — 82607 VITAMIN B-12: CPT

## 2024-04-01 PROCEDURE — 84450 TRANSFERASE (AST) (SGOT): CPT

## 2024-04-01 PROCEDURE — 86140 C-REACTIVE PROTEIN: CPT

## 2024-04-01 PROCEDURE — 80061 LIPID PANEL: CPT

## 2024-04-01 PROCEDURE — 82306 VITAMIN D 25 HYDROXY: CPT

## 2024-04-01 PROCEDURE — 77080 DXA BONE DENSITY AXIAL: CPT

## 2024-04-01 PROCEDURE — 83036 HEMOGLOBIN GLYCOSYLATED A1C: CPT

## 2024-04-01 PROCEDURE — 82565 ASSAY OF CREATININE: CPT

## 2024-04-01 PROCEDURE — 84436 ASSAY OF TOTAL THYROXINE: CPT

## 2024-04-03 ENCOUNTER — TELEPHONE (OUTPATIENT)
Dept: PRIMARY CARE CLINIC | Age: 64
End: 2024-04-03

## 2024-04-03 DIAGNOSIS — E78.2 MIXED HYPERLIPIDEMIA: Primary | ICD-10-CM

## 2024-04-03 RX ORDER — NITROFURANTOIN 25; 75 MG/1; MG/1
100 CAPSULE ORAL 2 TIMES DAILY
Qty: 14 CAPSULE | Refills: 0 | Status: SHIPPED | OUTPATIENT
Start: 2024-04-03

## 2024-04-03 RX ORDER — ROSUVASTATIN CALCIUM 10 MG/1
10 TABLET, COATED ORAL DAILY
Qty: 30 TABLET | Refills: 5 | Status: SHIPPED | OUTPATIENT
Start: 2024-04-03

## 2024-04-04 ENCOUNTER — TELEPHONE (OUTPATIENT)
Dept: PRIMARY CARE CLINIC | Age: 64
End: 2024-04-04

## 2024-04-04 NOTE — TELEPHONE ENCOUNTER
----- Message from Renetta Hatch LPN sent at 4/3/2024 11:45 AM EDT -----  Patient also thinks she may be having a UTI> She's been experiencing lower back pain/ aches

## 2024-04-09 ENCOUNTER — TELEPHONE (OUTPATIENT)
Dept: PRIMARY CARE CLINIC | Age: 64
End: 2024-04-09

## 2024-04-09 NOTE — TELEPHONE ENCOUNTER
Pt notified and voiced understanding.  Appt scheduled with labs week before.  Also, appt scheduled for 4/19 to have urine rechecked

## 2024-04-09 NOTE — TELEPHONE ENCOUNTER
I am not sure we call this patient with her lab results but tell her cholesterol is high at 261 get redone in 4 months blood work a week before and see me low-fat diet

## 2024-04-19 ENCOUNTER — OFFICE VISIT (OUTPATIENT)
Dept: PRIMARY CARE CLINIC | Age: 64
End: 2024-04-19

## 2024-04-19 VITALS
WEIGHT: 184 LBS | SYSTOLIC BLOOD PRESSURE: 128 MMHG | DIASTOLIC BLOOD PRESSURE: 78 MMHG | BODY MASS INDEX: 33.65 KG/M2 | RESPIRATION RATE: 17 BRPM

## 2024-04-19 DIAGNOSIS — N39.0 RECURRENT UTI: Primary | ICD-10-CM

## 2024-04-19 DIAGNOSIS — M51.16 HERNIATION OF LUMBAR INTERVERTEBRAL DISC WITH RADICULOPATHY: ICD-10-CM

## 2024-04-19 LAB
BILIRUBIN, POC: NEGATIVE
BLOOD URINE, POC: NORMAL
CLARITY, POC: CLEAR
COLOR, POC: YELLOW
GLUCOSE URINE, POC: NEGATIVE
KETONES, POC: NEGATIVE
LEUKOCYTE EST, POC: NEGATIVE
NITRITE, POC: NEGATIVE
PH, POC: 6
PROTEIN, POC: NEGATIVE
SPECIFIC GRAVITY, POC: 1.02
UROBILINOGEN, POC: 0.2

## 2024-04-19 ASSESSMENT — ENCOUNTER SYMPTOMS
EYES NEGATIVE: 1
RESPIRATORY NEGATIVE: 1
GASTROINTESTINAL NEGATIVE: 1
ALLERGIC/IMMUNOLOGIC NEGATIVE: 1
BACK PAIN: 1

## 2024-04-19 NOTE — PROGRESS NOTES
24  Name: Yesi Jennings    : 1960    Sex: female    Age: 63 y.o.        Subjective:  Chief Complaint: Patient is here for urine freq      low back pain     Few d  no t sw ch   on  crestor     and no  sdie effects yet     Lwo back pain chronic   nothing new   no dsyuria   no freq        Review of Systems   Constitutional: Negative.    HENT: Negative.     Eyes: Negative.    Respiratory: Negative.     Cardiovascular: Negative.    Gastrointestinal: Negative.    Endocrine: Negative.    Genitourinary: Negative.    Musculoskeletal:  Positive for back pain.   Skin: Negative.    Allergic/Immunologic: Negative.    Neurological: Negative.    Hematological: Negative.    Psychiatric/Behavioral: Negative.           Current Outpatient Medications:     rosuvastatin (CRESTOR) 10 MG tablet, Take 1 tablet by mouth daily, Disp: 30 tablet, Rfl: 5    nitrofurantoin, macrocrystal-monohydrate, (MACROBID) 100 MG capsule, Take 1 capsule by mouth 2 times daily, Disp: 14 capsule, Rfl: 0    leflunomide (ARAVA) 20 MG tablet, Take 1 tablet by mouth daily, Disp: 90 tablet, Rfl: 5    [employer] ALLERGY RELIEF CHILDRENS 5 MG/5ML SOLN, GIVE 5 MLS BY MOUTH EVERY DAY, Disp: 118 mL, Rfl: 5    gabapentin (NEURONTIN) 300 MG capsule, Take 1 capsule by mouth nightly. Intended supply: 30 days (Patient taking differently: Take 1 capsule by mouth in the morning and at bedtime. Intended supply: 30 days), Disp: 90 capsule, Rfl: 0    FLUoxetine (PROZAC) 20 MG capsule, Take 2 capsules by mouth at bedtime, Disp: , Rfl:     zolpidem (AMBIEN) 5 MG tablet, Take 0.5 tablets by mouth nightly as needed for Sleep., Disp: , Rfl:     calcium carbonate (OSCAL) 500 MG TABS tablet, Take 1 tablet by mouth daily, Disp: , Rfl:     Multiple Vitamins-Minerals (CENTRUM ADULTS PO), Take by mouth daily, Disp: , Rfl:     Omega-3 1000 MG CAPS, Take by mouth daily, Disp: , Rfl:     Cholecalciferol (VITAMIN D) 50 MCG ( UT) CAPS capsule, Take by mouth daily, Disp: , Rfl:

## 2024-04-21 LAB
CULTURE: NORMAL
SPECIMEN DESCRIPTION: NORMAL

## 2024-04-22 ENCOUNTER — TELEPHONE (OUTPATIENT)
Dept: PRIMARY CARE CLINIC | Age: 64
End: 2024-04-22

## 2024-04-22 DIAGNOSIS — R31.29 MICROSCOPIC HEMATURIA: ICD-10-CM

## 2024-04-22 DIAGNOSIS — N39.0 RECURRENT UTI: Primary | ICD-10-CM

## 2024-04-23 NOTE — TELEPHONE ENCOUNTER
Patient states she's just going to hold off on the referral. She thinks the pain is her lower back from arthritis.

## 2024-04-23 NOTE — TELEPHONE ENCOUNTER
Probably nothing to worry about but needs to be evaluated.  When they call her tell her she said she wants to see one of the other doctors.in that office          Dr. Miles  and is very good

## 2024-04-23 NOTE — TELEPHONE ENCOUNTER
Called patient. Patient wants to know what the blood in her urine means. Is it something she should be worried about. If not she doesn't want to see a urologist. If it is something she should worry about she states she doesn't want to go to [facility] and see any Richhuttis

## 2024-06-25 ENCOUNTER — OFFICE VISIT (OUTPATIENT)
Dept: RHEUMATOLOGY | Age: 64
End: 2024-06-25
Payer: MEDICARE

## 2024-06-25 VITALS — HEIGHT: 63 IN | BODY MASS INDEX: 31.89 KG/M2 | WEIGHT: 180 LBS

## 2024-06-25 DIAGNOSIS — Z79.899 HIGH RISK MEDICATION USE: ICD-10-CM

## 2024-06-25 DIAGNOSIS — D84.9 IMMUNOSUPPRESSION (HCC): ICD-10-CM

## 2024-06-25 DIAGNOSIS — M05.79 RHEUMATOID ARTHRITIS INVOLVING MULTIPLE SITES WITH POSITIVE RHEUMATOID FACTOR (HCC): Primary | Chronic | ICD-10-CM

## 2024-06-25 DIAGNOSIS — M15.9 GENERALIZED OSTEOARTHRITIS: ICD-10-CM

## 2024-06-25 DIAGNOSIS — M85.89 OSTEOPENIA OF MULTIPLE SITES: ICD-10-CM

## 2024-06-25 PROCEDURE — 99214 OFFICE O/P EST MOD 30 MIN: CPT | Performed by: INTERNAL MEDICINE

## 2024-06-25 PROCEDURE — G2211 COMPLEX E/M VISIT ADD ON: HCPCS | Performed by: INTERNAL MEDICINE

## 2024-06-25 PROCEDURE — 1123F ACP DISCUSS/DSCN MKR DOCD: CPT | Performed by: INTERNAL MEDICINE

## 2024-06-25 RX ORDER — LEFLUNOMIDE 20 MG/1
20 TABLET ORAL DAILY
Qty: 90 TABLET | Refills: 5 | Status: SHIPPED | OUTPATIENT
Start: 2024-06-25

## 2024-06-25 ASSESSMENT — ENCOUNTER SYMPTOMS
COUGH: 0
COLOR CHANGE: 0
VOMITING: 0
DIARRHEA: 0
TROUBLE SWALLOWING: 0
SHORTNESS OF BREATH: 0
ABDOMINAL PAIN: 0
NAUSEA: 0

## 2024-06-25 NOTE — PROGRESS NOTES
Status: She is alert and oriented to person, place, and time. Mental status is at baseline.   Psychiatric:         Mood and Affect: Mood normal.         Behavior: Behavior normal.            Lab Results   Component Value Date    WBC 4.6 04/01/2024    HGB 12.8 04/01/2024    HCT 42.2 04/01/2024    MCV 92.3 04/01/2024     04/01/2024     Lab Results   Component Value Date     04/01/2024    K 4.3 04/01/2024     04/01/2024    CO2 27 04/01/2024    BUN 11 04/01/2024    CREATININE 0.6 04/01/2024    CREATININE 0.7 04/01/2024    GLUCOSE 87 04/01/2024    CALCIUM 9.3 04/01/2024    BILITOT 0.3 04/01/2024    ALKPHOS 110 (H) 04/01/2024    AST 21 04/01/2024    AST 20 04/01/2024    ALT 18 04/01/2024    ALT 18 04/01/2024    LABGLOM >90 04/01/2024    LABGLOM >90 04/01/2024    GFRAA >60 06/15/2022     Lab Results   Component Value Date    SUSANA NEGATIVE 12/20/2022     No results found for: \"RHEUMFACTOR\"  Lab Results   Component Value Date    SEDRATE 11 04/01/2024     Lab Results   Component Value Date    CRP 4.0 04/01/2024            An electronic signature was used to authenticate this note.    This note was generated with a voice recognition dictation system. Please disregard any errors or omission which have escaped my review.    --Gerardo Ramos DO

## 2024-07-01 ENCOUNTER — HOSPITAL ENCOUNTER (OUTPATIENT)
Age: 64
Discharge: HOME | End: 2024-07-01
Payer: MEDICARE

## 2024-07-01 DIAGNOSIS — D84.9 IMMUNOSUPPRESSION (HCC): ICD-10-CM

## 2024-07-01 DIAGNOSIS — M05.79 RHEUMATOID ARTHRITIS INVOLVING MULTIPLE SITES WITH POSITIVE RHEUMATOID FACTOR (HCC): Chronic | ICD-10-CM

## 2024-07-01 DIAGNOSIS — Z79.899 HIGH RISK MEDICATION USE: ICD-10-CM

## 2024-07-01 LAB
ALT SERPL-CCNC: 20 U/L (ref 0–32)
AST SERPL-CCNC: 20 U/L (ref 0–31)
BASOPHILS # BLD: 0.05 K/UL (ref 0–0.2)
BASOPHILS NFR BLD: 1 % (ref 0–2)
CREAT SERPL-MCNC: 0.6 MG/DL (ref 0.5–1)
CRP SERPL HS-MCNC: <3 MG/L (ref 0–5)
EOSINOPHIL # BLD: 0.24 K/UL (ref 0.05–0.5)
EOSINOPHILS RELATIVE PERCENT: 5 % (ref 0–6)
ERYTHROCYTE [DISTWIDTH] IN BLOOD BY AUTOMATED COUNT: 15.2 % (ref 11.5–15)
ERYTHROCYTE [SEDIMENTATION RATE] IN BLOOD BY WESTERGREN METHOD: 15 MM/HR (ref 0–20)
GFR, ESTIMATED: >90 ML/MIN/1.73M2
HCT VFR BLD AUTO: 41.3 % (ref 34–48)
HGB BLD-MCNC: 12.7 G/DL (ref 11.5–15.5)
IMM GRANULOCYTES # BLD AUTO: <0.03 K/UL (ref 0–0.58)
IMM GRANULOCYTES NFR BLD: 0 % (ref 0–5)
LYMPHOCYTES NFR BLD: 1.67 K/UL (ref 1.5–4)
LYMPHOCYTES RELATIVE PERCENT: 31 % (ref 20–42)
MCH RBC QN AUTO: 27.9 PG (ref 26–35)
MCHC RBC AUTO-ENTMCNC: 30.8 G/DL (ref 32–34.5)
MCV RBC AUTO: 90.8 FL (ref 80–99.9)
MONOCYTES NFR BLD: 0.47 K/UL (ref 0.1–0.95)
MONOCYTES NFR BLD: 9 % (ref 2–12)
NEUTROPHILS NFR BLD: 54 % (ref 43–80)
NEUTS SEG NFR BLD: 2.92 K/UL (ref 1.8–7.3)
PLATELET # BLD AUTO: 171 K/UL (ref 130–450)
PMV BLD AUTO: 12.5 FL (ref 7–12)
RBC # BLD AUTO: 4.55 M/UL (ref 3.5–5.5)
WBC OTHER # BLD: 5.4 K/UL (ref 4.5–11.5)

## 2024-07-01 PROCEDURE — 82565 ASSAY OF CREATININE: CPT

## 2024-07-01 PROCEDURE — 85652 RBC SED RATE AUTOMATED: CPT

## 2024-07-01 PROCEDURE — 36415 COLL VENOUS BLD VENIPUNCTURE: CPT

## 2024-07-01 PROCEDURE — 84460 ALANINE AMINO (ALT) (SGPT): CPT

## 2024-07-01 PROCEDURE — 86140 C-REACTIVE PROTEIN: CPT

## 2024-07-01 PROCEDURE — 85025 COMPLETE CBC W/AUTO DIFF WBC: CPT

## 2024-07-01 PROCEDURE — 84450 TRANSFERASE (AST) (SGOT): CPT

## 2024-08-02 ENCOUNTER — TELEPHONE (OUTPATIENT)
Dept: PRIMARY CARE CLINIC | Age: 64
End: 2024-08-02

## 2024-08-05 ENCOUNTER — TELEPHONE (OUTPATIENT)
Dept: PRIMARY CARE CLINIC | Age: 64
End: 2024-08-05

## 2024-08-05 DIAGNOSIS — I10 ESSENTIAL HYPERTENSION: ICD-10-CM

## 2024-08-05 DIAGNOSIS — M81.0 AGE-RELATED OSTEOPOROSIS WITHOUT CURRENT PATHOLOGICAL FRACTURE: ICD-10-CM

## 2024-08-05 DIAGNOSIS — E78.2 MIXED HYPERLIPIDEMIA: Primary | ICD-10-CM

## 2024-08-09 ENCOUNTER — HOSPITAL ENCOUNTER (OUTPATIENT)
Age: 64
Discharge: HOME | End: 2024-08-09
Payer: MEDICARE

## 2024-08-09 DIAGNOSIS — E78.2 MIXED HYPERLIPIDEMIA: ICD-10-CM

## 2024-08-09 DIAGNOSIS — M81.0 AGE-RELATED OSTEOPOROSIS WITHOUT CURRENT PATHOLOGICAL FRACTURE: ICD-10-CM

## 2024-08-09 DIAGNOSIS — I10 ESSENTIAL HYPERTENSION: ICD-10-CM

## 2024-08-09 LAB
25(OH)D3 SERPL-MCNC: 40.4 NG/ML (ref 30–100)
ALBUMIN SERPL-MCNC: 4.6 G/DL (ref 3.5–5.2)
ALP SERPL-CCNC: 123 U/L (ref 35–104)
ALT SERPL-CCNC: 16 U/L (ref 0–32)
ANION GAP SERPL CALCULATED.3IONS-SCNC: 9 MMOL/L (ref 7–16)
AST SERPL-CCNC: 18 U/L (ref 0–31)
BASOPHILS # BLD: 0.03 K/UL (ref 0–0.2)
BASOPHILS NFR BLD: 0 % (ref 0–2)
BILIRUB SERPL-MCNC: 0.2 MG/DL (ref 0–1.2)
BILIRUB UR QL STRIP: NEGATIVE
BUN SERPL-MCNC: 19 MG/DL (ref 6–23)
CALCIUM SERPL-MCNC: 9.1 MG/DL (ref 8.6–10.2)
CHLORIDE SERPL-SCNC: 103 MMOL/L (ref 98–107)
CHOLEST SERPL-MCNC: 192 MG/DL
CLARITY UR: CLEAR
CO2 SERPL-SCNC: 27 MMOL/L (ref 22–29)
COLOR UR: YELLOW
CREAT SERPL-MCNC: 0.7 MG/DL (ref 0.5–1)
EOSINOPHIL # BLD: 0.02 K/UL (ref 0.05–0.5)
EOSINOPHILS RELATIVE PERCENT: 0 % (ref 0–6)
ERYTHROCYTE [DISTWIDTH] IN BLOOD BY AUTOMATED COUNT: 14.9 % (ref 11.5–15)
GFR, ESTIMATED: >90 ML/MIN/1.73M2
GLUCOSE SERPL-MCNC: 106 MG/DL (ref 74–99)
GLUCOSE UR STRIP-MCNC: NEGATIVE MG/DL
HCT VFR BLD AUTO: 40.3 % (ref 34–48)
HDLC SERPL-MCNC: 96 MG/DL
HGB BLD-MCNC: 12.5 G/DL (ref 11.5–15.5)
HGB UR QL STRIP.AUTO: ABNORMAL
IMM GRANULOCYTES # BLD AUTO: <0.03 K/UL (ref 0–0.58)
IMM GRANULOCYTES NFR BLD: 0 % (ref 0–5)
KETONES UR STRIP-MCNC: NEGATIVE MG/DL
LDLC SERPL CALC-MCNC: 79 MG/DL
LEUKOCYTE ESTERASE UR QL STRIP: NEGATIVE
LYMPHOCYTES NFR BLD: 1.51 K/UL (ref 1.5–4)
LYMPHOCYTES RELATIVE PERCENT: 21 % (ref 20–42)
MCH RBC QN AUTO: 27.7 PG (ref 26–35)
MCHC RBC AUTO-ENTMCNC: 31 G/DL (ref 32–34.5)
MCV RBC AUTO: 89.2 FL (ref 80–99.9)
MONOCYTES NFR BLD: 0.36 K/UL (ref 0.1–0.95)
MONOCYTES NFR BLD: 5 % (ref 2–12)
NEUTROPHILS NFR BLD: 73 % (ref 43–80)
NEUTS SEG NFR BLD: 5.11 K/UL (ref 1.8–7.3)
NITRITE UR QL STRIP: NEGATIVE
PH UR STRIP: 6 [PH] (ref 5–9)
PLATELET # BLD AUTO: 206 K/UL (ref 130–450)
PMV BLD AUTO: 11.3 FL (ref 7–12)
POTASSIUM SERPL-SCNC: 4.2 MMOL/L (ref 3.5–5)
PROT SERPL-MCNC: 7.9 G/DL (ref 6.4–8.3)
PROT UR STRIP-MCNC: NEGATIVE MG/DL
RBC # BLD AUTO: 4.52 M/UL (ref 3.5–5.5)
RBC #/AREA URNS HPF: NORMAL /HPF
SODIUM SERPL-SCNC: 139 MMOL/L (ref 132–146)
SP GR UR STRIP: 1.02 (ref 1–1.03)
TRIGL SERPL-MCNC: 83 MG/DL
UROBILINOGEN UR STRIP-ACNC: 0.2 EU/DL (ref 0–1)
VLDLC SERPL CALC-MCNC: 17 MG/DL
WBC #/AREA URNS HPF: NORMAL /HPF
WBC OTHER # BLD: 7 K/UL (ref 4.5–11.5)

## 2024-08-09 PROCEDURE — 82306 VITAMIN D 25 HYDROXY: CPT

## 2024-08-09 PROCEDURE — 80053 COMPREHEN METABOLIC PANEL: CPT

## 2024-08-09 PROCEDURE — 81001 URINALYSIS AUTO W/SCOPE: CPT

## 2024-08-09 PROCEDURE — 85025 COMPLETE CBC W/AUTO DIFF WBC: CPT

## 2024-08-09 PROCEDURE — 80061 LIPID PANEL: CPT

## 2024-08-09 PROCEDURE — 36415 COLL VENOUS BLD VENIPUNCTURE: CPT

## 2024-08-15 ENCOUNTER — OFFICE VISIT (OUTPATIENT)
Dept: PRIMARY CARE CLINIC | Age: 64
End: 2024-08-15

## 2024-08-15 VITALS
BODY MASS INDEX: 31.54 KG/M2 | HEART RATE: 83 BPM | SYSTOLIC BLOOD PRESSURE: 135 MMHG | HEIGHT: 63 IN | OXYGEN SATURATION: 97 % | WEIGHT: 178 LBS | DIASTOLIC BLOOD PRESSURE: 82 MMHG | TEMPERATURE: 97.6 F

## 2024-08-15 DIAGNOSIS — M05.79 RHEUMATOID ARTHRITIS INVOLVING MULTIPLE SITES WITH POSITIVE RHEUMATOID FACTOR (HCC): Chronic | ICD-10-CM

## 2024-08-15 DIAGNOSIS — E03.9 ACQUIRED HYPOTHYROIDISM: ICD-10-CM

## 2024-08-15 DIAGNOSIS — E78.2 MIXED HYPERLIPIDEMIA: Primary | ICD-10-CM

## 2024-08-15 DIAGNOSIS — M81.0 AGE-RELATED OSTEOPOROSIS WITHOUT CURRENT PATHOLOGICAL FRACTURE: ICD-10-CM

## 2024-08-15 DIAGNOSIS — M51.16 HERNIATION OF LUMBAR INTERVERTEBRAL DISC WITH RADICULOPATHY: ICD-10-CM

## 2024-08-15 DIAGNOSIS — R73.03 PRE-DIABETES: ICD-10-CM

## 2024-08-15 DIAGNOSIS — M15.9 GENERALIZED OSTEOARTHRITIS: ICD-10-CM

## 2024-08-15 DIAGNOSIS — E53.8 VITAMIN B 12 DEFICIENCY: ICD-10-CM

## 2024-08-15 PROBLEM — J01.90 ACUTE INFECTION OF NASAL SINUS: Status: RESOLVED | Noted: 2022-10-15 | Resolved: 2024-08-15

## 2024-08-15 RX ORDER — GABAPENTIN 300 MG/1
300 CAPSULE ORAL 2 TIMES DAILY
Qty: 1 CAPSULE | Refills: 0
Start: 2024-08-15 | End: 2031-06-20

## 2024-08-15 SDOH — ECONOMIC STABILITY: INCOME INSECURITY: HOW HARD IS IT FOR YOU TO PAY FOR THE VERY BASICS LIKE FOOD, HOUSING, MEDICAL CARE, AND HEATING?: NOT HARD AT ALL

## 2024-08-15 SDOH — ECONOMIC STABILITY: FOOD INSECURITY: WITHIN THE PAST 12 MONTHS, THE FOOD YOU BOUGHT JUST DIDN'T LAST AND YOU DIDN'T HAVE MONEY TO GET MORE.: NEVER TRUE

## 2024-08-15 SDOH — ECONOMIC STABILITY: FOOD INSECURITY: WITHIN THE PAST 12 MONTHS, YOU WORRIED THAT YOUR FOOD WOULD RUN OUT BEFORE YOU GOT MONEY TO BUY MORE.: NEVER TRUE

## 2024-08-15 ASSESSMENT — ENCOUNTER SYMPTOMS
GASTROINTESTINAL NEGATIVE: 1
ALLERGIC/IMMUNOLOGIC NEGATIVE: 1
RESPIRATORY NEGATIVE: 1
EYES NEGATIVE: 1

## 2024-08-15 ASSESSMENT — PATIENT HEALTH QUESTIONNAIRE - PHQ9
SUM OF ALL RESPONSES TO PHQ QUESTIONS 1-9: 0
1. LITTLE INTEREST OR PLEASURE IN DOING THINGS: NOT AT ALL
SUM OF ALL RESPONSES TO PHQ QUESTIONS 1-9: 0
2. FEELING DOWN, DEPRESSED OR HOPELESS: NOT AT ALL
SUM OF ALL RESPONSES TO PHQ QUESTIONS 1-9: 0
SUM OF ALL RESPONSES TO PHQ QUESTIONS 1-9: 0
SUM OF ALL RESPONSES TO PHQ9 QUESTIONS 1 & 2: 0

## 2024-08-15 NOTE — PROGRESS NOTES
8/15/24  Name: Yesi Jennings    : 1960    Sex: female    Age: 64 y.o.        Subjective:  Chief Complaint: Patient is here for    arth back pain sleep STACIA erwin sent for emg yest  of arms yest   SW----c-6  radic---to hear form dr erwin    Saw  rheum and  ok        Review of Systems   Constitutional: Negative.    HENT: Negative.     Eyes: Negative.    Respiratory: Negative.     Cardiovascular: Negative.    Gastrointestinal: Negative.    Endocrine: Negative.    Genitourinary: Negative.    Musculoskeletal:  Positive for arthralgias.   Skin: Negative.    Allergic/Immunologic: Negative.    Neurological: Negative.    Hematological: Negative.    Psychiatric/Behavioral: Negative.           Current Outpatient Medications:     gabapentin (NEURONTIN) 300 MG capsule, Take 1 capsule by mouth in the morning and at bedtime for 5000 doses. Intended supply: 30 days, Disp: 1 capsule, Rfl: 0    leflunomide (ARAVA) 20 MG tablet, Take 1 tablet by mouth daily, Disp: 90 tablet, Rfl: 5    rosuvastatin (CRESTOR) 10 MG tablet, Take 1 tablet by mouth daily, Disp: 30 tablet, Rfl: 5    [employer] ALLERGY RELIEF CHILDRENS 5 MG/5ML SOLN, GIVE 5 MLS BY MOUTH EVERY DAY, Disp: 118 mL, Rfl: 5    FLUoxetine (PROZAC) 20 MG capsule, Take 2 capsules by mouth at bedtime, Disp: , Rfl:     zolpidem (AMBIEN) 5 MG tablet, Take 0.5 tablets by mouth nightly as needed for Sleep., Disp: , Rfl:     calcium carbonate (OSCAL) 500 MG TABS tablet, Take 1 tablet by mouth daily, Disp: , Rfl:     Multiple Vitamins-Minerals (CENTRUM ADULTS PO), Take by mouth daily, Disp: , Rfl:     Omega-3 1000 MG CAPS, Take by mouth daily, Disp: , Rfl:     Cholecalciferol (VITAMIN D) 50 MCG (2000 UT) CAPS capsule, Take by mouth daily, Disp: , Rfl:     vitamin E 1000 units capsule, Take 1 capsule by mouth daily, Disp: , Rfl:     Turmeric 500 MG CAPS, Take by mouth daily, Disp: , Rfl:     Ascorbic Acid (VITAMIN C) 250 MG tablet, Take 1 tablet by

## 2024-09-26 DIAGNOSIS — E78.2 MIXED HYPERLIPIDEMIA: ICD-10-CM

## 2024-09-26 RX ORDER — ROSUVASTATIN CALCIUM 10 MG/1
10 TABLET, COATED ORAL DAILY
Qty: 90 TABLET | Refills: 2 | Status: SHIPPED | OUTPATIENT
Start: 2024-09-26

## 2024-10-25 ENCOUNTER — OFFICE VISIT (OUTPATIENT)
Dept: PRIMARY CARE CLINIC | Age: 64
End: 2024-10-25
Payer: MEDICARE

## 2024-10-25 VITALS
WEIGHT: 186.4 LBS | TEMPERATURE: 97.8 F | DIASTOLIC BLOOD PRESSURE: 82 MMHG | HEIGHT: 63 IN | SYSTOLIC BLOOD PRESSURE: 134 MMHG | BODY MASS INDEX: 33.03 KG/M2 | OXYGEN SATURATION: 95 % | HEART RATE: 80 BPM

## 2024-10-25 DIAGNOSIS — M51.16 HERNIATION OF LUMBAR INTERVERTEBRAL DISC WITH RADICULOPATHY: Primary | ICD-10-CM

## 2024-10-25 DIAGNOSIS — M47.26 OSTEOARTHRITIS OF SPINE WITH RADICULOPATHY, LUMBAR REGION: ICD-10-CM

## 2024-10-25 DIAGNOSIS — M05.79 RHEUMATOID ARTHRITIS INVOLVING MULTIPLE SITES WITH POSITIVE RHEUMATOID FACTOR (HCC): Chronic | ICD-10-CM

## 2024-10-25 DIAGNOSIS — Z12.31 BREAST CANCER SCREENING BY MAMMOGRAM: ICD-10-CM

## 2024-10-25 DIAGNOSIS — E78.2 MIXED HYPERLIPIDEMIA: Chronic | ICD-10-CM

## 2024-10-25 PROCEDURE — 1123F ACP DISCUSS/DSCN MKR DOCD: CPT | Performed by: FAMILY MEDICINE

## 2024-10-25 PROCEDURE — 99214 OFFICE O/P EST MOD 30 MIN: CPT | Performed by: FAMILY MEDICINE

## 2024-10-25 SDOH — ECONOMIC STABILITY: FOOD INSECURITY: WITHIN THE PAST 12 MONTHS, YOU WORRIED THAT YOUR FOOD WOULD RUN OUT BEFORE YOU GOT MONEY TO BUY MORE.: NEVER TRUE

## 2024-10-25 SDOH — ECONOMIC STABILITY: INCOME INSECURITY: HOW HARD IS IT FOR YOU TO PAY FOR THE VERY BASICS LIKE FOOD, HOUSING, MEDICAL CARE, AND HEATING?: NOT HARD AT ALL

## 2024-10-25 SDOH — ECONOMIC STABILITY: FOOD INSECURITY: WITHIN THE PAST 12 MONTHS, THE FOOD YOU BOUGHT JUST DIDN'T LAST AND YOU DIDN'T HAVE MONEY TO GET MORE.: NEVER TRUE

## 2024-10-25 ASSESSMENT — ENCOUNTER SYMPTOMS
RESPIRATORY NEGATIVE: 1
BACK PAIN: 1
EYES NEGATIVE: 1
ALLERGIC/IMMUNOLOGIC NEGATIVE: 1
GASTROINTESTINAL NEGATIVE: 1

## 2024-10-25 NOTE — PROGRESS NOTES
10/25/24  Name: Yesi Jennings    : 1960    Sex: female    Age: 64 y.o.        Subjective:  Chief Complaint: Patient is here for lwo bak pain   arth  back pain   sleep  RA   weight           Had mri lwubma r  with sw  mcgrath Sever  L-2-3        Review of Systems   Constitutional: Negative.    HENT: Negative.     Eyes: Negative.    Respiratory: Negative.     Cardiovascular: Negative.    Gastrointestinal: Negative.    Endocrine: Negative.    Genitourinary: Negative.    Musculoskeletal:  Positive for back pain.   Skin: Negative.    Allergic/Immunologic: Negative.    Neurological: Negative.    Hematological: Negative.    Psychiatric/Behavioral: Negative.           Current Outpatient Medications:     rosuvastatin (CRESTOR) 10 MG tablet, Take 1 tablet by mouth daily, Disp: 90 tablet, Rfl: 2    gabapentin (NEURONTIN) 300 MG capsule, Take 1 capsule by mouth in the morning and at bedtime for 5000 doses. Intended supply: 30 days, Disp: 1 capsule, Rfl: 0    leflunomide (ARAVA) 20 MG tablet, Take 1 tablet by mouth daily, Disp: 90 tablet, Rfl: 5    [employer] ALLERGY RELIEF CHILDRENS 5 MG/5ML SOLN, GIVE 5 MLS BY MOUTH EVERY DAY, Disp: 118 mL, Rfl: 5    FLUoxetine (PROZAC) 20 MG capsule, Take 2 capsules by mouth at bedtime, Disp: , Rfl:     zolpidem (AMBIEN) 5 MG tablet, Take 0.5 tablets by mouth nightly as needed for Sleep., Disp: , Rfl:     calcium carbonate (OSCAL) 500 MG TABS tablet, Take 1 tablet by mouth daily, Disp: , Rfl:     Multiple Vitamins-Minerals (CENTRUM ADULTS PO), Take by mouth daily, Disp: , Rfl:     Omega-3 1000 MG CAPS, Take by mouth daily, Disp: , Rfl:     Cholecalciferol (VITAMIN D) 50 MCG (2000 UT) CAPS capsule, Take by mouth daily, Disp: , Rfl:     vitamin E 1000 units capsule, Take 1 capsule by mouth daily, Disp: , Rfl:     Turmeric 500 MG CAPS, Take by mouth daily, Disp: , Rfl:     Ascorbic Acid (VITAMIN C) 250 MG tablet, Take 1 tablet by mouth daily, Disp: , Rfl:   Allergies   Allergen Reactions

## 2024-11-26 DIAGNOSIS — Z12.31 BREAST CANCER SCREENING BY MAMMOGRAM: ICD-10-CM

## 2024-12-07 DIAGNOSIS — D84.9 IMMUNOSUPPRESSION (HCC): ICD-10-CM

## 2024-12-07 DIAGNOSIS — M05.79 RHEUMATOID ARTHRITIS INVOLVING MULTIPLE SITES WITH POSITIVE RHEUMATOID FACTOR (HCC): Chronic | ICD-10-CM

## 2024-12-10 RX ORDER — LEFLUNOMIDE 20 MG/1
20 TABLET ORAL DAILY
Qty: 90 TABLET | Refills: 1 | Status: SHIPPED
Start: 2024-12-10 | End: 2024-12-13 | Stop reason: SDUPTHER

## 2024-12-12 ENCOUNTER — OFFICE VISIT (OUTPATIENT)
Dept: PRIMARY CARE CLINIC | Age: 64
End: 2024-12-12
Payer: MEDICARE

## 2024-12-12 VITALS
DIASTOLIC BLOOD PRESSURE: 82 MMHG | HEART RATE: 75 BPM | WEIGHT: 183 LBS | HEIGHT: 63 IN | TEMPERATURE: 97.4 F | RESPIRATION RATE: 18 BRPM | OXYGEN SATURATION: 97 % | BODY MASS INDEX: 32.43 KG/M2 | SYSTOLIC BLOOD PRESSURE: 134 MMHG

## 2024-12-12 DIAGNOSIS — E78.2 MIXED HYPERLIPIDEMIA: Chronic | ICD-10-CM

## 2024-12-12 DIAGNOSIS — Z01.818 PRE-OP EXAMINATION: Primary | ICD-10-CM

## 2024-12-12 DIAGNOSIS — M19.011 PRIMARY OSTEOARTHRITIS OF RIGHT SHOULDER: ICD-10-CM

## 2024-12-12 DIAGNOSIS — M05.79 RHEUMATOID ARTHRITIS INVOLVING MULTIPLE SITES WITH POSITIVE RHEUMATOID FACTOR (HCC): Chronic | ICD-10-CM

## 2024-12-12 PROCEDURE — 93000 ELECTROCARDIOGRAM COMPLETE: CPT | Performed by: FAMILY MEDICINE

## 2024-12-12 PROCEDURE — 1123F ACP DISCUSS/DSCN MKR DOCD: CPT | Performed by: FAMILY MEDICINE

## 2024-12-12 PROCEDURE — 99214 OFFICE O/P EST MOD 30 MIN: CPT | Performed by: FAMILY MEDICINE

## 2024-12-12 PROCEDURE — 1159F MED LIST DOCD IN RCRD: CPT | Performed by: FAMILY MEDICINE

## 2024-12-12 SDOH — ECONOMIC STABILITY: FOOD INSECURITY: WITHIN THE PAST 12 MONTHS, YOU WORRIED THAT YOUR FOOD WOULD RUN OUT BEFORE YOU GOT MONEY TO BUY MORE.: NEVER TRUE

## 2024-12-12 SDOH — ECONOMIC STABILITY: INCOME INSECURITY: HOW HARD IS IT FOR YOU TO PAY FOR THE VERY BASICS LIKE FOOD, HOUSING, MEDICAL CARE, AND HEATING?: NOT HARD AT ALL

## 2024-12-12 SDOH — ECONOMIC STABILITY: FOOD INSECURITY: WITHIN THE PAST 12 MONTHS, THE FOOD YOU BOUGHT JUST DIDN'T LAST AND YOU DIDN'T HAVE MONEY TO GET MORE.: NEVER TRUE

## 2024-12-12 ASSESSMENT — PATIENT HEALTH QUESTIONNAIRE - PHQ9
2. FEELING DOWN, DEPRESSED OR HOPELESS: NOT AT ALL
1. LITTLE INTEREST OR PLEASURE IN DOING THINGS: NOT AT ALL
SUM OF ALL RESPONSES TO PHQ QUESTIONS 1-9: 0
SUM OF ALL RESPONSES TO PHQ9 QUESTIONS 1 & 2: 0
SUM OF ALL RESPONSES TO PHQ QUESTIONS 1-9: 0

## 2024-12-12 ASSESSMENT — ENCOUNTER SYMPTOMS
GASTROINTESTINAL NEGATIVE: 1
ALLERGIC/IMMUNOLOGIC NEGATIVE: 1
EYES NEGATIVE: 1
RESPIRATORY NEGATIVE: 1

## 2024-12-12 NOTE — PROGRESS NOTES
patient's room. More than half of that time was spent face to face with the patient in counseling and coordinating care.  Check blood pressure at home twice a day.  Low-salt low caffeine diet.  Call if systolic blood pressure is above 150 and diastolic blood pressures above 85.  Only use a upper arm digital cuff.    Aggressive low-fat diet.  Avoid red meats, greasy fried foods, dairy products.  Avoid processed foods.  Take cholesterol medications without food.    I informed patient about the risk associated with noncompliance of medication and taking medications incorrectly.  Appropriate follow-up with myself and all specialist.  Encourage family members to take active role in assisting with medications and medical care.  If any confusion should develop to notify my office immediately to avoid risk of worsening medical condition      Follow Up: Return in about 2 months (around 2/12/2025) for Lab Before.     Seen by:  Alfredito Moore DO

## 2024-12-13 ENCOUNTER — TELEMEDICINE (OUTPATIENT)
Dept: RHEUMATOLOGY | Age: 64
End: 2024-12-13
Payer: MEDICARE

## 2024-12-13 DIAGNOSIS — M15.9 GENERALIZED OSTEOARTHRITIS: ICD-10-CM

## 2024-12-13 DIAGNOSIS — M85.89 OSTEOPENIA OF MULTIPLE SITES: ICD-10-CM

## 2024-12-13 DIAGNOSIS — Z79.899 HIGH RISK MEDICATION USE: ICD-10-CM

## 2024-12-13 DIAGNOSIS — D84.9 IMMUNOSUPPRESSION (HCC): ICD-10-CM

## 2024-12-13 DIAGNOSIS — M05.79 RHEUMATOID ARTHRITIS INVOLVING MULTIPLE SITES WITH POSITIVE RHEUMATOID FACTOR (HCC): Primary | Chronic | ICD-10-CM

## 2024-12-13 PROCEDURE — 99214 OFFICE O/P EST MOD 30 MIN: CPT | Performed by: INTERNAL MEDICINE

## 2024-12-13 PROCEDURE — 1123F ACP DISCUSS/DSCN MKR DOCD: CPT | Performed by: INTERNAL MEDICINE

## 2024-12-13 PROCEDURE — G2211 COMPLEX E/M VISIT ADD ON: HCPCS | Performed by: INTERNAL MEDICINE

## 2024-12-13 RX ORDER — LEFLUNOMIDE 20 MG/1
20 TABLET ORAL DAILY
Qty: 90 TABLET | Refills: 1 | Status: SHIPPED | OUTPATIENT
Start: 2024-12-13

## 2024-12-13 ASSESSMENT — ENCOUNTER SYMPTOMS
NAUSEA: 0
COLOR CHANGE: 0
TROUBLE SWALLOWING: 0
DIARRHEA: 0
COUGH: 0
SHORTNESS OF BREATH: 0
VOMITING: 0
ABDOMINAL PAIN: 0

## 2024-12-13 NOTE — PROGRESS NOTES
Yesi Jennings, was evaluated through a synchronous (real-time) audio-video encounter. The patient (or guardian if applicable) is aware that this is a billable service, which includes applicable co-pays. This Virtual Visit was conducted with patient's (and/or legal guardian's) consent. Patient identification was verified, and a caregiver was present when appropriate.   The patient was located at Home: [address] [place] [address]  Provider was located at Home (Appt Dept State): [place]  Confirm you are appropriately licensed, registered, or certified to deliver care in the state where the patient is located as indicated above. If you are not or unsure, please re-schedule the visit: Yes, I confirm.      Total time spent for this encounter: Not billed by time    --Gerardo Ramos,  on 12/13/2024 at 11:34 AM    An electronic signature was used to authenticate this note.     Yesi Jennings 1960 is a 64 y.o. female, here for evaluation of the following chief complaint(s):  Follow-up (Patient present for a  VV today for a rheumatoid arthritis. Patient states that she will be getting Rt shoulder surgery done next week Dr. James. )      Assessment & Plan   ASSESSMENT/PLAN:    Yesi Jennings 1960 is a 64 y.o. female seen in follow-up for rheumatoid arthritis.    1.  RF positive, CCP positive, nonerosive positive rheumatoid arthritis-   She has failed Plaquenil and methotrexate in the past.  She has been hesitant to start Biologics so we have her on Arava.  We have discussed escalation to Biologics several times but for the last several visits she has done well enough on Arava.  She continues to do pretty well from a rheumatoid standpoint.  She will be having her right shoulder replaced next week more so due to osteoarthritis and she does have some low back pain which is unrelated.  We will not make any changes.    2.  Osteoarthritis-either way she has underlying osteoarthritis.  She can

## 2024-12-16 ENCOUNTER — HOSPITAL ENCOUNTER (OUTPATIENT)
Age: 64
Discharge: HOME | End: 2024-12-16
Payer: MEDICARE

## 2024-12-16 DIAGNOSIS — D84.9 IMMUNOSUPPRESSION (HCC): ICD-10-CM

## 2024-12-16 DIAGNOSIS — Z79.899 HIGH RISK MEDICATION USE: ICD-10-CM

## 2024-12-16 DIAGNOSIS — M05.79 RHEUMATOID ARTHRITIS INVOLVING MULTIPLE SITES WITH POSITIVE RHEUMATOID FACTOR (HCC): Chronic | ICD-10-CM

## 2024-12-16 LAB
ALT SERPL-CCNC: 15 U/L (ref 0–32)
AST SERPL-CCNC: 26 U/L (ref 0–31)
BASOPHILS # BLD: 0.05 K/UL (ref 0–0.2)
BASOPHILS NFR BLD: 1 % (ref 0–2)
CREAT SERPL-MCNC: 0.7 MG/DL (ref 0.5–1)
CRP SERPL HS-MCNC: 5 MG/L (ref 0–5)
EOSINOPHIL # BLD: 0.2 K/UL (ref 0.05–0.5)
EOSINOPHILS RELATIVE PERCENT: 4 % (ref 0–6)
ERYTHROCYTE [DISTWIDTH] IN BLOOD BY AUTOMATED COUNT: 13.8 % (ref 11.5–15)
ERYTHROCYTE [SEDIMENTATION RATE] IN BLOOD BY WESTERGREN METHOD: 14 MM/HR (ref 0–20)
GFR, ESTIMATED: >90 ML/MIN/1.73M2
HCT VFR BLD AUTO: 39.4 % (ref 34–48)
HGB BLD-MCNC: 12 G/DL (ref 11.5–15.5)
IMM GRANULOCYTES # BLD AUTO: <0.03 K/UL (ref 0–0.58)
IMM GRANULOCYTES NFR BLD: 0 % (ref 0–5)
LYMPHOCYTES NFR BLD: 1.58 K/UL (ref 1.5–4)
LYMPHOCYTES RELATIVE PERCENT: 33 % (ref 20–42)
MCH RBC QN AUTO: 28.1 PG (ref 26–35)
MCHC RBC AUTO-ENTMCNC: 30.5 G/DL (ref 32–34.5)
MCV RBC AUTO: 92.3 FL (ref 80–99.9)
MONOCYTES NFR BLD: 0.49 K/UL (ref 0.1–0.95)
MONOCYTES NFR BLD: 10 % (ref 2–12)
NEUTROPHILS NFR BLD: 52 % (ref 43–80)
NEUTS SEG NFR BLD: 2.52 K/UL (ref 1.8–7.3)
PLATELET # BLD AUTO: 167 K/UL (ref 130–450)
PMV BLD AUTO: 12 FL (ref 7–12)
RBC # BLD AUTO: 4.27 M/UL (ref 3.5–5.5)
WBC OTHER # BLD: 4.9 K/UL (ref 4.5–11.5)

## 2024-12-16 PROCEDURE — 82565 ASSAY OF CREATININE: CPT

## 2024-12-16 PROCEDURE — 85025 COMPLETE CBC W/AUTO DIFF WBC: CPT

## 2024-12-16 PROCEDURE — 84450 TRANSFERASE (AST) (SGOT): CPT

## 2024-12-16 PROCEDURE — 36415 COLL VENOUS BLD VENIPUNCTURE: CPT

## 2024-12-16 PROCEDURE — 84460 ALANINE AMINO (ALT) (SGPT): CPT

## 2024-12-16 PROCEDURE — 86140 C-REACTIVE PROTEIN: CPT

## 2024-12-16 PROCEDURE — 85652 RBC SED RATE AUTOMATED: CPT

## 2024-12-24 ENCOUNTER — TELEPHONE (OUTPATIENT)
Dept: PRIMARY CARE CLINIC | Age: 64
End: 2024-12-24

## 2024-12-24 NOTE — TELEPHONE ENCOUNTER
The pt's home health nurse is calling to let you know that the pt's heart rate was elevated today at her visit with her at 120

## 2025-01-03 ENCOUNTER — TELEPHONE (OUTPATIENT)
Dept: PRIMARY CARE CLINIC | Age: 65
End: 2025-01-03

## 2025-01-03 NOTE — TELEPHONE ENCOUNTER
Mental health assessment, mood interview done and she expressed s/s of depression and they wanted you aware.

## 2025-02-24 ENCOUNTER — OFFICE VISIT (OUTPATIENT)
Dept: FAMILY MEDICINE CLINIC | Age: 65
End: 2025-02-24

## 2025-02-24 VITALS
BODY MASS INDEX: 31.35 KG/M2 | WEIGHT: 177 LBS | TEMPERATURE: 97.6 F | RESPIRATION RATE: 18 BRPM | DIASTOLIC BLOOD PRESSURE: 80 MMHG | HEART RATE: 69 BPM | OXYGEN SATURATION: 99 % | SYSTOLIC BLOOD PRESSURE: 120 MMHG

## 2025-02-24 DIAGNOSIS — J34.9 SINUS PROBLEM: Primary | ICD-10-CM

## 2025-02-24 DIAGNOSIS — R09.81 NASAL CONGESTION: ICD-10-CM

## 2025-02-24 DIAGNOSIS — R05.9 COUGH, UNSPECIFIED TYPE: ICD-10-CM

## 2025-02-24 DIAGNOSIS — J01.90 ACUTE NON-RECURRENT SINUSITIS, UNSPECIFIED LOCATION: ICD-10-CM

## 2025-02-24 DIAGNOSIS — H61.23 BILATERAL IMPACTED CERUMEN: ICD-10-CM

## 2025-02-24 LAB
INFLUENZA A ANTIBODY: NORMAL
INFLUENZA B ANTIBODY: NORMAL
Lab: NORMAL
PERFORMING INSTRUMENT: NORMAL
QC PASS/FAIL: NORMAL
SARS-COV-2, POC: NORMAL

## 2025-02-24 NOTE — PROGRESS NOTES
25  Yesi Jennings : 1960 Sex: female  Age 64 y.o.      Subjective:  Chief Complaint   Patient presents with    Pharyngitis     X 1 day    Congestion     X 1 day    Ear Fullness     X 1 day    Headache     X 1 day    Cough     X 1 day         HPI:     History of Present Illness  The patient is a 64-year-old female who presents for evaluation of sinus issues.    She reports experiencing symptoms of cough, congestion, postnasal drainage, and rhinorrhea. Additionally, she describes a sensation of ear fullness and perceives her sinuses as being congested. She has been sneezing and expresses feeling unwell today. She has a history of recurrent ear issues, which she attributes to cerumen impaction. She also reports pruritus in the affected ear.    She underwent shoulder replacement surgery 2 months prior and is currently engaged in physical therapy sessions twice weekly on  and . She is seeking advice on whether to temporarily discontinue these sessions due to her current health status.    ALLERGIES  The patient is allergic to DOXYCYCLINE, PERCOCET, and IODINE.            ROS:   Unless otherwise stated in this report the patient's positive and negative responses for review of systems for constitutional, eyes, ENT, cardiovascular, respiratory, gastrointestinal, neurological, , musculoskeletal, and integument systems and related systems to the presenting problem are either stated in the history of present illness or were not pertinent or were negative for the symptoms and/or complaints related to the presenting medical problem.  Positives and pertinent negatives as per HPI.  All others reviewed and are negative.      PMH:     Past Medical History:   Diagnosis Date    Acute infection of nasal sinus 10/15/2022    Childhood asthma     Depression with anxiety     Diverticulitis     Fatigue     History of stomach ulcers     IBS (irritable bowel syndrome)     Low vitamin B12 level     PTSD

## 2025-02-26 ENCOUNTER — OFFICE VISIT (OUTPATIENT)
Dept: PRIMARY CARE CLINIC | Age: 65
End: 2025-02-26

## 2025-02-26 VITALS
OXYGEN SATURATION: 99 % | WEIGHT: 176 LBS | HEART RATE: 90 BPM | DIASTOLIC BLOOD PRESSURE: 83 MMHG | TEMPERATURE: 97.1 F | SYSTOLIC BLOOD PRESSURE: 128 MMHG | HEIGHT: 63 IN | BODY MASS INDEX: 31.18 KG/M2

## 2025-02-26 DIAGNOSIS — J01.80 ACUTE NON-RECURRENT SINUSITIS OF OTHER SINUS: Primary | ICD-10-CM

## 2025-02-26 DIAGNOSIS — J20.8 ACUTE BRONCHITIS DUE TO OTHER SPECIFIED ORGANISMS: ICD-10-CM

## 2025-02-26 DIAGNOSIS — J39.8 CONGESTION OF UPPER RESPIRATORY TRACT: ICD-10-CM

## 2025-02-26 LAB
INFLUENZA A ANTIBODY: NEGATIVE
INFLUENZA B ANTIBODY: NEGATIVE
Lab: NORMAL
PERFORMING INSTRUMENT: NORMAL
QC PASS/FAIL: NORMAL
SARS-COV-2, POC: NORMAL

## 2025-02-26 RX ORDER — PREDNISONE 10 MG/1
10 TABLET ORAL
Qty: 15 TABLET | Refills: 0 | Status: SHIPPED | OUTPATIENT
Start: 2025-02-26 | End: 2025-03-03

## 2025-02-26 RX ORDER — CEFTRIAXONE SODIUM 250 MG/1
250 INJECTION, POWDER, FOR SOLUTION INTRAMUSCULAR; INTRAVENOUS ONCE
Status: COMPLETED | OUTPATIENT
Start: 2025-02-26 | End: 2025-02-26

## 2025-02-26 RX ADMIN — CEFTRIAXONE SODIUM 250 MG: 250 INJECTION, POWDER, FOR SOLUTION INTRAMUSCULAR; INTRAVENOUS at 13:34

## 2025-02-26 SDOH — ECONOMIC STABILITY: FOOD INSECURITY: WITHIN THE PAST 12 MONTHS, YOU WORRIED THAT YOUR FOOD WOULD RUN OUT BEFORE YOU GOT MONEY TO BUY MORE.: NEVER TRUE

## 2025-02-26 SDOH — ECONOMIC STABILITY: FOOD INSECURITY: WITHIN THE PAST 12 MONTHS, THE FOOD YOU BOUGHT JUST DIDN'T LAST AND YOU DIDN'T HAVE MONEY TO GET MORE.: NEVER TRUE

## 2025-02-26 ASSESSMENT — PATIENT HEALTH QUESTIONNAIRE - PHQ9
SUM OF ALL RESPONSES TO PHQ QUESTIONS 1-9: 0
SUM OF ALL RESPONSES TO PHQ QUESTIONS 1-9: 0
SUM OF ALL RESPONSES TO PHQ9 QUESTIONS 1 & 2: 0
SUM OF ALL RESPONSES TO PHQ QUESTIONS 1-9: 0
1. LITTLE INTEREST OR PLEASURE IN DOING THINGS: NOT AT ALL
SUM OF ALL RESPONSES TO PHQ QUESTIONS 1-9: 0
2. FEELING DOWN, DEPRESSED OR HOPELESS: NOT AT ALL

## 2025-02-26 NOTE — PROGRESS NOTES
Yesi Jennings (:  1960) is a 64 y.o. female, Established patient, here for evaluation of the following chief complaint(s):  Express Follow Up, Congestion (Patient reports of cough, congestion, nausea, sore throat, and chills for 2 days. ), and Cough            Subjective   History of Present Illness  The patient presents for evaluation of sinusitis and bronchitis.    She reports a recent onset of pharyngeal discomfort, characterized by a scratchy sensation and frequent throat clearing. This was followed by the development of rhinorrhea and postnasal drainage, which has been causing nausea. She also experiences chills and headaches but does not have a fever. She describes a sensation of pressure in her head, akin to it being on the verge of explosion. She has been experiencing mild wheezing today, although her lungs were clear upon examination 2 days ago.    She sought medical attention at [facility] 2 days prior, where no significant findings were reported. During that visit, she underwent bilateral ear irrigation due to cerumen impaction, but she continues to experience pruritus in her ears. She was prescribed an antibiotic, which she suspects may be contributing to her nausea.    Supplemental Information  She is still going to outpatient therapy for her shoulder but canceled it because she did not want to get anybody sick. She had her surgery done on 2024.    Review of Systems:  Constitutional:  No fever, no fatigue, no chills, no headaches, no weight change  Dermatology:  No rash, no mole, no dry or sensitive skin  ENT:  No cough, no sore throat, no sinus pain, no runny nose, no ear pain  Cardiology:  No chest pain, no palpitations, no leg edema, no shortness of breath, no PND  Gastroenterology:  No dysphagia, no abdominal pain, no nausea, no vomiting, no constipation, no diarrhea, no heartburn  Musculoskeletal:  No joint pain, no leg cramps, no back pain, no muscle aches  Respiratory:  No

## 2025-03-03 ENCOUNTER — TELEPHONE (OUTPATIENT)
Dept: PRIMARY CARE CLINIC | Age: 65
End: 2025-03-03

## 2025-03-03 NOTE — TELEPHONE ENCOUNTER
Pt asking if she should be on some albuterol by nebulizer, she has the machine but no medicine. She's been sick and has a lot of congestion, coughing-white sputum, does have some wheezing and chest congestion, says she feels listless. Finished the prednisone you gave her also, and has a couple more days of the ATB yet.

## 2025-03-04 ENCOUNTER — OFFICE VISIT (OUTPATIENT)
Dept: PRIMARY CARE CLINIC | Age: 65
End: 2025-03-04
Payer: MEDICARE

## 2025-03-04 VITALS
SYSTOLIC BLOOD PRESSURE: 135 MMHG | RESPIRATION RATE: 16 BRPM | TEMPERATURE: 97.6 F | OXYGEN SATURATION: 97 % | HEART RATE: 85 BPM | DIASTOLIC BLOOD PRESSURE: 82 MMHG | WEIGHT: 174 LBS | BODY MASS INDEX: 30.82 KG/M2

## 2025-03-04 DIAGNOSIS — J20.8 ACUTE BRONCHITIS DUE TO OTHER SPECIFIED ORGANISMS: Primary | ICD-10-CM

## 2025-03-04 DIAGNOSIS — J01.80 ACUTE NON-RECURRENT SINUSITIS OF OTHER SINUS: ICD-10-CM

## 2025-03-04 PROCEDURE — 99213 OFFICE O/P EST LOW 20 MIN: CPT | Performed by: FAMILY MEDICINE

## 2025-03-04 PROCEDURE — 1123F ACP DISCUSS/DSCN MKR DOCD: CPT | Performed by: FAMILY MEDICINE

## 2025-03-04 RX ORDER — CEPHALEXIN 500 MG/1
500 CAPSULE ORAL 3 TIMES DAILY
Qty: 21 CAPSULE | Refills: 0 | Status: SHIPPED | OUTPATIENT
Start: 2025-03-04

## 2025-03-04 RX ORDER — METHYLPREDNISOLONE 4 MG/1
TABLET ORAL
Qty: 1 KIT | Refills: 0 | Status: SHIPPED | OUTPATIENT
Start: 2025-03-04

## 2025-03-04 RX ORDER — ALBUTEROL SULFATE 0.83 MG/ML
2.5 SOLUTION RESPIRATORY (INHALATION) 4 TIMES DAILY PRN
Qty: 120 EACH | Refills: 3 | Status: SHIPPED | OUTPATIENT
Start: 2025-03-04

## 2025-03-04 SDOH — ECONOMIC STABILITY: FOOD INSECURITY: WITHIN THE PAST 12 MONTHS, THE FOOD YOU BOUGHT JUST DIDN'T LAST AND YOU DIDN'T HAVE MONEY TO GET MORE.: NEVER TRUE

## 2025-03-04 SDOH — ECONOMIC STABILITY: FOOD INSECURITY: WITHIN THE PAST 12 MONTHS, YOU WORRIED THAT YOUR FOOD WOULD RUN OUT BEFORE YOU GOT MONEY TO BUY MORE.: NEVER TRUE

## 2025-03-04 SDOH — ECONOMIC STABILITY: FOOD INSECURITY: WITHIN THE PAST 12 MONTHS, THE FOOD YOU BOUGHT JUST DIDN'T LAST AND YOU DIDN'T HAVE MONEY TO GET MORE.: SOMETIMES TRUE

## 2025-03-04 NOTE — PROGRESS NOTES
schedule as soon as possible if overdue, as this is important in assessing for undiagnosed pathology, especially cancer, as well as protecting against potentially harmful/life threatening disease.        Patient and/or guardian verbalizes understanding and agrees with above counseling, assessment and plan.    All questions answered.    I educated the patient about all medications.  Make sure they were correct and educated  on the risk associated with missing meds or taking them incorrectly.  A list of medications is being sent home with patient today.    A great deal of time spent reviewing medications, diet, exercise, social issues. Also reviewing the chart before entering the room with patient and finishing charting after leaving patient's room. More than half of that time was spent face to face with the patient in counseling and coordinating care.  I informed patient about the risk associated with noncompliance of medication and taking medications incorrectly.  Appropriate follow-up with myself and all specialist.  Encourage family members to take active role in assisting with medications and medical care.  If any confusion should develop to notify my office immediately to avoid risk of worsening medical condition    I have personally reviewed and updated the chief complaint, HPI, Past Medical, Family and Social History, as well as the above Review of Systems.     The patient (or guardian, if applicable) and other individuals in attendance with the patient were advised that Artificial Intelligence will be utilized during this visit to record, process the conversation to generate a clinical note and to support improvement of the AI technology. The patient (or guardian, if applicable) and other individuals in attendance at the appointment consented to the use of AI, including the recording.      An electronic signature was used to authenticate this note.    --Alfredito Moore, DO

## 2025-06-10 ENCOUNTER — OFFICE VISIT (OUTPATIENT)
Dept: PRIMARY CARE CLINIC | Age: 65
End: 2025-06-10
Payer: MEDICARE

## 2025-06-10 VITALS
OXYGEN SATURATION: 96 % | TEMPERATURE: 98.3 F | DIASTOLIC BLOOD PRESSURE: 83 MMHG | BODY MASS INDEX: 31.35 KG/M2 | RESPIRATION RATE: 16 BRPM | SYSTOLIC BLOOD PRESSURE: 134 MMHG | HEART RATE: 69 BPM | WEIGHT: 177 LBS

## 2025-06-10 DIAGNOSIS — K58.0 IRRITABLE BOWEL SYNDROME WITH DIARRHEA: ICD-10-CM

## 2025-06-10 DIAGNOSIS — M05.79 RHEUMATOID ARTHRITIS INVOLVING MULTIPLE SITES WITH POSITIVE RHEUMATOID FACTOR (HCC): Chronic | ICD-10-CM

## 2025-06-10 DIAGNOSIS — M85.89 OSTEOPENIA OF MULTIPLE SITES: ICD-10-CM

## 2025-06-10 DIAGNOSIS — T78.40XA ALLERGY, INITIAL ENCOUNTER: Primary | ICD-10-CM

## 2025-06-10 DIAGNOSIS — E78.2 MIXED HYPERLIPIDEMIA: Chronic | ICD-10-CM

## 2025-06-10 PROCEDURE — 99214 OFFICE O/P EST MOD 30 MIN: CPT | Performed by: FAMILY MEDICINE

## 2025-06-10 PROCEDURE — 1123F ACP DISCUSS/DSCN MKR DOCD: CPT | Performed by: FAMILY MEDICINE

## 2025-06-10 RX ORDER — DICYCLOMINE HCL 20 MG
20 TABLET ORAL EVERY 6 HOURS PRN
Qty: 40 TABLET | Refills: 5 | Status: SHIPPED | OUTPATIENT
Start: 2025-06-10

## 2025-06-10 SDOH — ECONOMIC STABILITY: FOOD INSECURITY: WITHIN THE PAST 12 MONTHS, THE FOOD YOU BOUGHT JUST DIDN'T LAST AND YOU DIDN'T HAVE MONEY TO GET MORE.: NEVER TRUE

## 2025-06-10 SDOH — ECONOMIC STABILITY: FOOD INSECURITY: WITHIN THE PAST 12 MONTHS, YOU WORRIED THAT YOUR FOOD WOULD RUN OUT BEFORE YOU GOT MONEY TO BUY MORE.: NEVER TRUE

## 2025-06-10 ASSESSMENT — PATIENT HEALTH QUESTIONNAIRE - PHQ9
SUM OF ALL RESPONSES TO PHQ QUESTIONS 1-9: 0
SUM OF ALL RESPONSES TO PHQ QUESTIONS 1-9: 0
1. LITTLE INTEREST OR PLEASURE IN DOING THINGS: NOT AT ALL
2. FEELING DOWN, DEPRESSED OR HOPELESS: NOT AT ALL
SUM OF ALL RESPONSES TO PHQ QUESTIONS 1-9: 0
SUM OF ALL RESPONSES TO PHQ QUESTIONS 1-9: 0

## 2025-06-10 NOTE — PROGRESS NOTES
Yesi Jennings (:  1960) is a 64 y.o. female,    here for evaluation of the following chief complaint(s):  Other (Pt would like food allergy testing)            Subjective   History of Present Illness  The patient presents for evaluation of allergies, irritable bowel syndrome, hyperlipidemia, osteopenia, and rheumatoid arthritis.    She reports experiencing food allergies, with dairy being a known trigger. She is uncertain about potential allergies to chocolate or nuts and has never undergone a food allergy test. Additionally, she mentions that eggs occasionally cause her discomfort. She expresses concern about the need for prior authorization from her insurance company for these tests.    She describes her stomach as being in a state of disarray, often leading to queasiness and diarrhea. She has previously consulted a gastroenterologist and has attempted to manage her symptoms with over-the-counter Imodium. She experiences cramping and diarrhea related to food intake, which she associates with irritable bowel syndrome.    She is currently on medication for cholesterol management.    She is scheduled for blood work with her rheumatologist this Friday.    She has been getting bit by insects and is seeking advice on a repellent.    Review of Systems:  Constitutional:  No fever, no fatigue, no chills, no headaches, no weight change  Dermatology:  No rash, no mole, no dry or sensitive skin  ENT:  No cough, no sore throat, no sinus pain, no runny nose, no ear pain  Cardiology:  No chest pain, no palpitations, no leg edema, no shortness of breath, no PND  Gastroenterology:  No dysphagia, no abdominal pain, no nausea, no vomiting, no constipation, no diarrhea, no heartburn  Musculoskeletal:  No joint pain, no leg cramps, no back pain, no muscle aches  Respiratory:  No shortness of breath, no orthopnea, no wheezing, no MONTERO, no hemoptysis  Urology:  No blood in the urine, no urinary frequency, no urinary

## 2025-06-13 ENCOUNTER — HOSPITAL ENCOUNTER (OUTPATIENT)
Age: 65
Discharge: HOME | End: 2025-06-13
Payer: MEDICARE

## 2025-06-13 DIAGNOSIS — R73.03 PRE-DIABETES: ICD-10-CM

## 2025-06-13 DIAGNOSIS — E03.9 ACQUIRED HYPOTHYROIDISM: ICD-10-CM

## 2025-06-13 DIAGNOSIS — M05.79 RHEUMATOID ARTHRITIS INVOLVING MULTIPLE SITES WITH POSITIVE RHEUMATOID FACTOR (HCC): Chronic | ICD-10-CM

## 2025-06-13 DIAGNOSIS — T78.40XA ALLERGY, INITIAL ENCOUNTER: ICD-10-CM

## 2025-06-13 DIAGNOSIS — E78.2 MIXED HYPERLIPIDEMIA: ICD-10-CM

## 2025-06-13 DIAGNOSIS — M81.0 AGE-RELATED OSTEOPOROSIS WITHOUT CURRENT PATHOLOGICAL FRACTURE: ICD-10-CM

## 2025-06-13 DIAGNOSIS — E53.8 VITAMIN B 12 DEFICIENCY: ICD-10-CM

## 2025-06-13 LAB
25(OH)D3 SERPL-MCNC: 46.3 NG/ML (ref 30–100)
ALBUMIN SERPL-MCNC: 4.4 G/DL (ref 3.5–5.2)
ALP SERPL-CCNC: 125 U/L (ref 35–104)
ALT SERPL-CCNC: 16 U/L (ref 0–32)
ANION GAP SERPL CALCULATED.3IONS-SCNC: 11 MMOL/L (ref 7–16)
AST SERPL-CCNC: 19 U/L (ref 0–31)
BASOPHILS # BLD: 0.04 K/UL (ref 0–0.2)
BASOPHILS NFR BLD: 1 % (ref 0–2)
BILIRUB SERPL-MCNC: 0.4 MG/DL (ref 0–1.2)
BILIRUB UR QL STRIP: NEGATIVE
BUN SERPL-MCNC: 12 MG/DL (ref 6–23)
CALCIUM SERPL-MCNC: 9.3 MG/DL (ref 8.6–10.2)
CHLORIDE SERPL-SCNC: 103 MMOL/L (ref 98–107)
CHOLEST SERPL-MCNC: 169 MG/DL
CLARITY UR: CLEAR
CO2 SERPL-SCNC: 26 MMOL/L (ref 22–29)
COLOR UR: YELLOW
CREAT SERPL-MCNC: 0.7 MG/DL (ref 0.5–1)
EOSINOPHIL # BLD: 0.34 K/UL (ref 0.05–0.5)
EOSINOPHILS RELATIVE PERCENT: 8 % (ref 0–6)
ERYTHROCYTE [DISTWIDTH] IN BLOOD BY AUTOMATED COUNT: 14.8 % (ref 11.5–15)
GFR, ESTIMATED: >90 ML/MIN/1.73M2
GLUCOSE SERPL-MCNC: 97 MG/DL (ref 74–99)
GLUCOSE UR STRIP-MCNC: NEGATIVE MG/DL
HBA1C MFR BLD: 5.3 % (ref 4–5.6)
HCT VFR BLD AUTO: 38.3 % (ref 34–48)
HDLC SERPL-MCNC: 87 MG/DL
HGB BLD-MCNC: 11.6 G/DL (ref 11.5–15.5)
HGB UR QL STRIP.AUTO: ABNORMAL
IMM GRANULOCYTES # BLD AUTO: <0.03 K/UL (ref 0–0.58)
IMM GRANULOCYTES NFR BLD: 0 % (ref 0–5)
KETONES UR STRIP-MCNC: NEGATIVE MG/DL
LDLC SERPL CALC-MCNC: 62 MG/DL
LEUKOCYTE ESTERASE UR QL STRIP: NEGATIVE
LYMPHOCYTES NFR BLD: 1.27 K/UL (ref 1.5–4)
LYMPHOCYTES RELATIVE PERCENT: 30 % (ref 20–42)
MCH RBC QN AUTO: 28.1 PG (ref 26–35)
MCHC RBC AUTO-ENTMCNC: 30.3 G/DL (ref 32–34.5)
MCV RBC AUTO: 92.7 FL (ref 80–99.9)
MONOCYTES NFR BLD: 0.38 K/UL (ref 0.1–0.95)
MONOCYTES NFR BLD: 9 % (ref 2–12)
NEUTROPHILS NFR BLD: 52 % (ref 43–80)
NEUTS SEG NFR BLD: 2.24 K/UL (ref 1.8–7.3)
NITRITE UR QL STRIP: NEGATIVE
PH UR STRIP: 6 [PH] (ref 5–8)
PLATELET # BLD AUTO: 175 K/UL (ref 130–450)
PMV BLD AUTO: 11.6 FL (ref 7–12)
POTASSIUM SERPL-SCNC: 4.5 MMOL/L (ref 3.5–5)
PROT SERPL-MCNC: 7.2 G/DL (ref 6.4–8.3)
PROT UR STRIP-MCNC: NEGATIVE MG/DL
RBC # BLD AUTO: 4.13 M/UL (ref 3.5–5.5)
RBC #/AREA URNS HPF: NORMAL /HPF
SODIUM SERPL-SCNC: 140 MMOL/L (ref 132–146)
SP GR UR STRIP: 1.02 (ref 1–1.03)
T4 SERPL-MCNC: 5.5 UG/DL (ref 4.5–11.7)
TRIGL SERPL-MCNC: 99 MG/DL
TSH SERPL DL<=0.05 MIU/L-ACNC: 0.98 UIU/ML (ref 0.27–4.2)
UROBILINOGEN UR STRIP-ACNC: 0.2 EU/DL (ref 0–1)
VIT B12 SERPL-MCNC: >2000 PG/ML (ref 232–1245)
VLDLC SERPL CALC-MCNC: 20 MG/DL
WBC #/AREA URNS HPF: NORMAL /HPF
WBC OTHER # BLD: 4.3 K/UL (ref 4.5–11.5)

## 2025-06-13 PROCEDURE — 84436 ASSAY OF TOTAL THYROXINE: CPT

## 2025-06-13 PROCEDURE — 80061 LIPID PANEL: CPT

## 2025-06-13 PROCEDURE — 82607 VITAMIN B-12: CPT

## 2025-06-13 PROCEDURE — 36415 COLL VENOUS BLD VENIPUNCTURE: CPT

## 2025-06-13 PROCEDURE — 82785 ASSAY OF IGE: CPT

## 2025-06-13 PROCEDURE — 81001 URINALYSIS AUTO W/SCOPE: CPT

## 2025-06-13 PROCEDURE — 85025 COMPLETE CBC W/AUTO DIFF WBC: CPT

## 2025-06-13 PROCEDURE — 80053 COMPREHEN METABOLIC PANEL: CPT

## 2025-06-13 PROCEDURE — 82306 VITAMIN D 25 HYDROXY: CPT

## 2025-06-13 PROCEDURE — 84443 ASSAY THYROID STIM HORMONE: CPT

## 2025-06-13 PROCEDURE — 86003 ALLG SPEC IGE CRUDE XTRC EA: CPT

## 2025-06-13 PROCEDURE — 83036 HEMOGLOBIN GLYCOSYLATED A1C: CPT

## 2025-06-15 LAB
BARLEY IGE QN: NORMAL KU/L (ref 0–0.34)
BEEF IGE QN: NORMAL KU/L (ref 0–0.34)
CABBAGE IGE QN: NORMAL KU/L (ref 0–0.34)
CARROT IGE QN: NORMAL KU/L (ref 0–0.34)
CHICKEN MEAT IGE QN: NORMAL KU/L (ref 0–0.34)
CLAM IGE QN: NORMAL KU/L (ref 0–0.34)
CODFISH IGE QN: NORMAL KU/L (ref 0–0.34)
CODFISH IGE QN: NORMAL KU/L (ref 0–0.34)
CORN IGE QN: NORMAL KU/L (ref 0–0.34)
CORN IGE QN: NORMAL KU/L (ref 0–0.34)
COW MILK IGE QN: NORMAL KU/L (ref 0–0.34)
COW MILK IGE QN: NORMAL KU/L (ref 0–0.34)
CRAB IGE QN: NORMAL KU/L (ref 0–0.34)
EGG WHITE IGE QN: NORMAL KU/L (ref 0–0.34)
GRAPE IGE QN: NORMAL KU/L (ref 0–0.34)
IGE SERPL-ACNC: 51 IU/ML (ref 0–100)
IGE SERPL-ACNC: 53 IU/ML (ref 0–100)
LETTUCE IGE QN: NORMAL KU/L (ref 0–0.34)
OAT IGE QN: NORMAL KU/L (ref 0–0.34)
ORANGE TREE IGE QN: NORMAL KU/L (ref 0–0.34)
PAPRIKA IGE QN: NORMAL KU/L (ref 0–0.34)
PEANUT IGE QN: NORMAL KU/L (ref 0–0.34)
PEANUT IGE QN: NORMAL KU/L (ref 0–0.34)
PORK IGE QN: NORMAL KU/L (ref 0–0.34)
POTATO IGE QN: NORMAL KU/L (ref 0–0.34)
RICE IGE QN: NORMAL KU/L (ref 0–0.34)
RYE IGE QN: NORMAL KU/L (ref 0–0.34)
SCALLOP IGE QN: NORMAL KU/L (ref 0–0.34)
SHRIMP IGE QN: NORMAL KU/L (ref 0–0.34)
SHRIMP IGE QN: NORMAL KU/L (ref 0–0.34)
SOYBEAN IGE QN: NORMAL KU/L (ref 0–0.34)
SOYBEAN IGE QN: NORMAL KU/L (ref 0–0.34)
TOMATO IGE QN: NORMAL KU/L (ref 0–0.34)
TUNA IGE QN: NORMAL KU/L (ref 0–0.34)
WALNUT IGE QN: NORMAL KU/L (ref 0–0.34)
WHEAT IGE QN: NORMAL KU/L (ref 0–0.34)
WHEAT IGE QN: NORMAL KU/L (ref 0–0.34)
WHITE BEAN IGE QN: NORMAL KU/L (ref 0–0.34)
WHOLE EGG IGE QN: NORMAL KU/L (ref 0–0.34)

## 2025-06-16 ENCOUNTER — RESULTS FOLLOW-UP (OUTPATIENT)
Dept: PRIMARY CARE CLINIC | Age: 65
End: 2025-06-16

## 2025-06-16 ENCOUNTER — OFFICE VISIT (OUTPATIENT)
Dept: RHEUMATOLOGY | Age: 65
End: 2025-06-16
Payer: MEDICARE

## 2025-06-16 VITALS
HEART RATE: 72 BPM | BODY MASS INDEX: 29.88 KG/M2 | HEIGHT: 64 IN | SYSTOLIC BLOOD PRESSURE: 123 MMHG | WEIGHT: 175 LBS | DIASTOLIC BLOOD PRESSURE: 77 MMHG

## 2025-06-16 DIAGNOSIS — M15.9 GENERALIZED OSTEOARTHRITIS: ICD-10-CM

## 2025-06-16 DIAGNOSIS — M05.79 RHEUMATOID ARTHRITIS INVOLVING MULTIPLE SITES WITH POSITIVE RHEUMATOID FACTOR (HCC): Primary | Chronic | ICD-10-CM

## 2025-06-16 DIAGNOSIS — T78.40XA ALLERGY, INITIAL ENCOUNTER: Primary | ICD-10-CM

## 2025-06-16 DIAGNOSIS — Z79.899 HIGH RISK MEDICATION USE: ICD-10-CM

## 2025-06-16 DIAGNOSIS — D84.9 IMMUNOSUPPRESSION: ICD-10-CM

## 2025-06-16 DIAGNOSIS — M85.89 OSTEOPENIA OF MULTIPLE SITES: ICD-10-CM

## 2025-06-16 LAB
BARLEY IGE QN: 0.12 KU/L (ref 0–0.34)
BEEF IGE QN: <0.1 KU/L (ref 0–0.34)
CABBAGE IGE QN: <0.1 KU/L (ref 0–0.34)
CARROT IGE QN: <0.1 KU/L (ref 0–0.34)
CHICKEN MEAT IGE QN: <0.1 KU/L (ref 0–0.34)
CLAM IGE QN: <0.1 KU/L (ref 0–0.34)
CODFISH IGE QN: <0.1 KU/L (ref 0–0.34)
CODFISH IGE QN: <0.1 KU/L (ref 0–0.34)
CORN IGE QN: 0.11 KU/L (ref 0–0.34)
CORN IGE QN: 0.11 KU/L (ref 0–0.34)
COW MILK IGE QN: <0.1 KU/L (ref 0–0.34)
COW MILK IGE QN: <0.1 KU/L (ref 0–0.34)
CRAB IGE QN: <0.1 KU/L (ref 0–0.34)
EGG WHITE IGE QN: <0.1 KU/L (ref 0–0.34)
GRAPE IGE QN: <0.1 KU/L (ref 0–0.34)
IGE SERPL-ACNC: 51 IU/ML (ref 0–100)
IGE SERPL-ACNC: 53 IU/ML (ref 0–100)
LETTUCE IGE QN: <0.1 KU/L (ref 0–0.34)
OAT IGE QN: <0.1 KU/L (ref 0–0.34)
ORANGE TREE IGE QN: <0.1 KU/L (ref 0–0.34)
PAPRIKA IGE QN: <0.1 KU/L (ref 0–0.34)
PEANUT IGE QN: 0.16 KU/L (ref 0–0.34)
PEANUT IGE QN: 0.16 KU/L (ref 0–0.34)
PORK IGE QN: <0.1 KU/L (ref 0–0.34)
POTATO IGE QN: 0.11 KU/L (ref 0–0.34)
RICE IGE QN: 0.11 KU/L (ref 0–0.34)
RYE IGE QN: 0.11 KU/L (ref 0–0.34)
SCALLOP IGE QN: <0.1 KU/L (ref 0–0.34)
SHRIMP IGE QN: <0.1 KU/L (ref 0–0.34)
SHRIMP IGE QN: <0.1 KU/L (ref 0–0.34)
SOYBEAN IGE QN: <0.1 KU/L (ref 0–0.34)
SOYBEAN IGE QN: <0.1 KU/L (ref 0–0.34)
TOMATO IGE QN: 0.11 KU/L (ref 0–0.34)
TUNA IGE QN: <0.1 KU/L (ref 0–0.34)
WALNUT IGE QN: <0.1 KU/L (ref 0–0.34)
WHEAT IGE QN: 0.11 KU/L (ref 0–0.34)
WHEAT IGE QN: 0.13 KU/L (ref 0–0.34)
WHITE BEAN IGE QN: 0.1 KU/L (ref 0–0.34)
WHOLE EGG IGE QN: <0.1 KU/L (ref 0–0.34)

## 2025-06-16 PROCEDURE — 99214 OFFICE O/P EST MOD 30 MIN: CPT | Performed by: INTERNAL MEDICINE

## 2025-06-16 PROCEDURE — 1159F MED LIST DOCD IN RCRD: CPT | Performed by: INTERNAL MEDICINE

## 2025-06-16 PROCEDURE — 1123F ACP DISCUSS/DSCN MKR DOCD: CPT | Performed by: INTERNAL MEDICINE

## 2025-06-16 PROCEDURE — G2211 COMPLEX E/M VISIT ADD ON: HCPCS | Performed by: INTERNAL MEDICINE

## 2025-06-16 RX ORDER — LEFLUNOMIDE 20 MG/1
20 TABLET ORAL DAILY
Qty: 90 TABLET | Refills: 1 | Status: SHIPPED | OUTPATIENT
Start: 2025-06-16

## 2025-06-16 ASSESSMENT — ENCOUNTER SYMPTOMS
COUGH: 0
DIARRHEA: 0
ABDOMINAL PAIN: 0
SHORTNESS OF BREATH: 0
VOMITING: 0
COLOR CHANGE: 0
NAUSEA: 0
TROUBLE SWALLOWING: 0

## 2025-06-16 NOTE — PATIENT INSTRUCTIONS
No medication changes    Have labs every 3 months around mid September will be next then again in mid December

## 2025-06-19 DIAGNOSIS — E78.2 MIXED HYPERLIPIDEMIA: ICD-10-CM

## 2025-06-19 RX ORDER — ROSUVASTATIN CALCIUM 10 MG/1
10 TABLET, COATED ORAL DAILY
Qty: 90 TABLET | Refills: 5 | Status: SHIPPED | OUTPATIENT
Start: 2025-06-19

## 2025-07-11 ENCOUNTER — APPOINTMENT (OUTPATIENT)
Dept: GENERAL RADIOLOGY | Age: 65
End: 2025-07-11
Payer: MEDICARE

## 2025-07-11 ENCOUNTER — HOSPITAL ENCOUNTER (EMERGENCY)
Age: 65
Discharge: HOME | End: 2025-07-11
Attending: EMERGENCY MEDICINE
Payer: MEDICARE

## 2025-07-11 VITALS
HEIGHT: 64 IN | OXYGEN SATURATION: 98 % | RESPIRATION RATE: 14 BRPM | SYSTOLIC BLOOD PRESSURE: 153 MMHG | WEIGHT: 175 LBS | DIASTOLIC BLOOD PRESSURE: 72 MMHG | TEMPERATURE: 98.7 F | BODY MASS INDEX: 29.88 KG/M2 | HEART RATE: 98 BPM

## 2025-07-11 DIAGNOSIS — S52.552A OTHER CLOSED EXTRA-ARTICULAR FRACTURE OF DISTAL END OF LEFT RADIUS, INITIAL ENCOUNTER: Primary | ICD-10-CM

## 2025-07-11 DIAGNOSIS — M89.8X1 PAIN OF LEFT SCAPULA: ICD-10-CM

## 2025-07-11 PROCEDURE — 73100 X-RAY EXAM OF WRIST: CPT

## 2025-07-11 PROCEDURE — 73110 X-RAY EXAM OF WRIST: CPT

## 2025-07-11 PROCEDURE — 99283 EMERGENCY DEPT VISIT LOW MDM: CPT

## 2025-07-11 PROCEDURE — 71046 X-RAY EXAM CHEST 2 VIEWS: CPT

## 2025-07-11 PROCEDURE — 6370000000 HC RX 637 (ALT 250 FOR IP)

## 2025-07-11 PROCEDURE — 29125 APPL SHORT ARM SPLINT STATIC: CPT

## 2025-07-11 RX ORDER — LIDOCAINE HYDROCHLORIDE 10 MG/ML
5 INJECTION, SOLUTION INFILTRATION; PERINEURAL ONCE
Status: DISCONTINUED | OUTPATIENT
Start: 2025-07-11 | End: 2025-07-11 | Stop reason: HOSPADM

## 2025-07-11 RX ORDER — HYDROCODONE BITARTRATE AND ACETAMINOPHEN 5; 325 MG/1; MG/1
1 TABLET ORAL EVERY 6 HOURS PRN
Qty: 12 TABLET | Refills: 0 | Status: SHIPPED | OUTPATIENT
Start: 2025-07-11 | End: 2025-07-14

## 2025-07-11 RX ORDER — IBUPROFEN 800 MG/1
800 TABLET, FILM COATED ORAL ONCE
Status: COMPLETED | OUTPATIENT
Start: 2025-07-11 | End: 2025-07-11

## 2025-07-11 RX ORDER — BUPIVACAINE HYDROCHLORIDE 2.5 MG/ML
30 INJECTION, SOLUTION EPIDURAL; INFILTRATION; INTRACAUDAL; PERINEURAL ONCE
Status: DISCONTINUED | OUTPATIENT
Start: 2025-07-11 | End: 2025-07-11 | Stop reason: HOSPADM

## 2025-07-11 RX ADMIN — IBUPROFEN 800 MG: 800 TABLET, FILM COATED ORAL at 09:09

## 2025-07-11 ASSESSMENT — PAIN DESCRIPTION - LOCATION
LOCATION: WRIST
LOCATION: ARM

## 2025-07-11 ASSESSMENT — PAIN DESCRIPTION - ORIENTATION
ORIENTATION: LEFT
ORIENTATION: LEFT

## 2025-07-11 ASSESSMENT — PAIN SCALES - GENERAL
PAINLEVEL_OUTOF10: 8
PAINLEVEL_OUTOF10: 8

## 2025-07-11 ASSESSMENT — LIFESTYLE VARIABLES
HOW MANY STANDARD DRINKS CONTAINING ALCOHOL DO YOU HAVE ON A TYPICAL DAY: PATIENT DOES NOT DRINK
HOW OFTEN DO YOU HAVE A DRINK CONTAINING ALCOHOL: NEVER

## 2025-07-11 ASSESSMENT — PAIN - FUNCTIONAL ASSESSMENT: PAIN_FUNCTIONAL_ASSESSMENT: 0-10

## 2025-07-11 NOTE — ED PROVIDER NOTES
[facility] EMERGENCY DEPARTMENT  EMERGENCY DEPARTMENT ENCOUNTER        Pt Name: Yesi Jennings  MRN: [id number]  Birthdate 1960  Date of evaluation: 7/11/2025  Provider: Alfred Cohen MD  PCP: Alfredito Moore DO  Note Started: 7:37 AM EDT 7/11/25    CHIEF COMPLAINT       Chief Complaint   Patient presents with    Fall     fall last night, left wrist pain, left shoulder pain.       HISTORY OF PRESENT ILLNESS: 1 or more Elements   History From: Patient    Limitations to history : None    Yesi Jennings is a 65 y.o. female with past medical history of hyperlipidemia, rheumatoid arthritis, asthma, anxiety, lumbar radiculopathy, osteoarthritis who presents from home with complaints of left wrist pain, left scapular pain that began after mechanical fall last night.  Patient states around 2200 hrs she tripped and fell onto her left wrist.  Denies striking head or any loss of consciousness at this time.  Denies any chest pain, shortness of breath, abdominal pain, headache, blurry vision, nausea, vomiting, diarrhea, neck pain neck stiffness, numbness or tingling in extremities, dizziness or lightheadedness prior to or post fall, fevers or chills.  Denies tobacco, EtOH, illicit drug use.  Denies any different ambulating at this time.    Nursing Notes were all reviewed and agreed with or any disagreements were addressed in the HPI.    ROS:   Pertinent positives and negatives are stated within HPI, all other systems reviewed and are negative.    --------------------------------------------- PAST HISTORY ---------------------------------------------  Past Medical History:  has a past medical history of Acute infection of nasal sinus, Childhood asthma, Depression with anxiety, Diverticulitis, Fatigue, History of stomach ulcers, IBS (irritable bowel syndrome), Low vitamin B12 level, PTSD (post-traumatic stress disorder), RA (rheumatoid arthritis) ([facility]), and Weight gain.    Past  hemodynamically stable during their ED course.    Counseling:   The emergency provider has spoken with the patient and discussed today’s results, in addition to providing specific details for the plan of care and counseling regarding the diagnosis and prognosis.  Questions are answered at this time and they are agreeable with the plan.     --------------------------------- IMPRESSION AND DISPOSITION ---------------------------------    IMPRESSION  1. Other closed extra-articular fracture of distal end of left radius, initial encounter    2. Pain of left scapula        DISPOSITION  Disposition: Discharge to home  Patient condition is stable    NOTE: This report was transcribed using voice recognition software. Every effort was made to ensure accuracy; however, inadvertent computerized transcription errors may be present         ATTENDING PROVIDER ATTESTATION:     Yesi EZE Jennings presented to the emergency department for evaluation of Fall (fall last night, left wrist pain, left shoulder pain.)    I have reviewed and discussed the case, including pertinent history (medical, surgical, family and social) and exam findings with the Resident and the Nurse assigned to Yesi Jennings.  I have personally performed and/or participated in the history, exam, medical decision making, and procedures and agree with all pertinent clinical information.       I have reviewed my findings and recommendations with Yesi Jennings and members of family present at the time of disposition.      I personally supervised and inspected the left arm splint placed by Merry. The extremity is appropriately immobilized. Patient neurovascularly intact before and after the application.      MDM:     I, Dr. Ruiz am the primary provider of record    Medical Decision Making  Plain radiographs ordered to evaluate for fractures or other bony abnormalities.      Amount and/or Complexity of Data Reviewed  Radiology: ordered and

## 2025-07-15 NOTE — PROGRESS NOTES
Checked back with patient, Dr Gonsales was out yesterday so she is still waiting to talk with his office. She is going to let us know

## 2025-08-18 ENCOUNTER — OFFICE VISIT (OUTPATIENT)
Dept: PRIMARY CARE CLINIC | Age: 65
End: 2025-08-18
Payer: MEDICARE

## 2025-08-18 VITALS
WEIGHT: 171 LBS | SYSTOLIC BLOOD PRESSURE: 134 MMHG | OXYGEN SATURATION: 96 % | BODY MASS INDEX: 29.35 KG/M2 | DIASTOLIC BLOOD PRESSURE: 78 MMHG | RESPIRATION RATE: 16 BRPM | TEMPERATURE: 97.6 F | HEART RATE: 88 BPM

## 2025-08-18 DIAGNOSIS — M05.79 RHEUMATOID ARTHRITIS INVOLVING MULTIPLE SITES WITH POSITIVE RHEUMATOID FACTOR (HCC): Chronic | ICD-10-CM

## 2025-08-18 DIAGNOSIS — J01.80 ACUTE NON-RECURRENT SINUSITIS OF OTHER SINUS: Primary | ICD-10-CM

## 2025-08-18 PROCEDURE — 99213 OFFICE O/P EST LOW 20 MIN: CPT | Performed by: FAMILY MEDICINE

## 2025-08-18 PROCEDURE — 96372 THER/PROPH/DIAG INJ SC/IM: CPT | Performed by: FAMILY MEDICINE

## 2025-08-18 PROCEDURE — 1159F MED LIST DOCD IN RCRD: CPT | Performed by: FAMILY MEDICINE

## 2025-08-18 PROCEDURE — 1123F ACP DISCUSS/DSCN MKR DOCD: CPT | Performed by: FAMILY MEDICINE

## 2025-08-18 RX ORDER — CEFDINIR 300 MG/1
300 CAPSULE ORAL 2 TIMES DAILY
Qty: 20 CAPSULE | Refills: 0 | Status: SHIPPED | OUTPATIENT
Start: 2025-08-18 | End: 2025-08-28

## 2025-08-18 RX ORDER — METHYLPREDNISOLONE ACETATE 40 MG/ML
20 INJECTION, SUSPENSION INTRA-ARTICULAR; INTRALESIONAL; INTRAMUSCULAR; SOFT TISSUE ONCE
Status: COMPLETED | OUTPATIENT
Start: 2025-08-18 | End: 2025-08-18

## 2025-08-18 RX ADMIN — METHYLPREDNISOLONE ACETATE 20 MG: 40 INJECTION, SUSPENSION INTRA-ARTICULAR; INTRALESIONAL; INTRAMUSCULAR; SOFT TISSUE at 10:57

## 2025-08-18 ASSESSMENT — PATIENT HEALTH QUESTIONNAIRE - PHQ9
SUM OF ALL RESPONSES TO PHQ QUESTIONS 1-9: 0
SUM OF ALL RESPONSES TO PHQ QUESTIONS 1-9: 0
1. LITTLE INTEREST OR PLEASURE IN DOING THINGS: NOT AT ALL
2. FEELING DOWN, DEPRESSED OR HOPELESS: NOT AT ALL
SUM OF ALL RESPONSES TO PHQ QUESTIONS 1-9: 0
SUM OF ALL RESPONSES TO PHQ QUESTIONS 1-9: 0

## (undated) DEVICE — NARROW SAW BLADE

## (undated) DEVICE — BNDG,ELSTC,MATRIX,STRL,6"X5YD,LF,HOOK&LP: Brand: MEDLINE

## (undated) DEVICE — SOLUTION IV IRRIG POUR BRL 0.9% SODIUM CHL 2F7124

## (undated) DEVICE — SUTURE VIC +  4-0 27 IN PS1 UD VCP935H

## (undated) DEVICE — PAD,ABDOMINAL,5"X9",ST,LF,25/BX: Brand: MEDLINE INDUSTRIES, INC.

## (undated) DEVICE — SYRINGE MED 30ML STD CLR PLAS LUERLOCK TIP N CTRL DISP

## (undated) DEVICE — DRAPE,EXTREMITY,89X128,STERILE: Brand: MEDLINE

## (undated) DEVICE — BLOCK BITE 60FR RUBBER ADLT DENTAL

## (undated) DEVICE — CHLORAPREP 26ML ORANGE

## (undated) DEVICE — PADDING CAST W4INXL4YD SPUN DACRON POLY POR SYN VERSATILE

## (undated) DEVICE — TUBING, SUCTION, 9/32" X 10', STRAIGHT: Brand: MEDLINE

## (undated) DEVICE — Device: Brand: PROPHECY INFINITY

## (undated) DEVICE — 3M™ COBAN™ NL STERILE NON-LATEX SELF-ADHERENT WRAP, 2084S, 4 IN X 5 YD (10 CM X 4,5 M), 18 ROLLS/CASE: Brand: 3M™ COBAN™

## (undated) DEVICE — APPLICATOR PREP 26ML 0.7% IOD POVACRYLEX 74% ISO ALC ST

## (undated) DEVICE — DRESSING FOAM W22XL25CM FILVE LAYR FOAM DP DEF SAFETAC

## (undated) DEVICE — SWAB SPEC COLL SHFT L5.25IN POLYUR FOAM TIP SFT DBL MEDIA

## (undated) DEVICE — ZIMMER® STERILE DISPOSABLE TOURNIQUET CUFF WITH PROTECTIVE SLEEVE AND PLC, DUAL PORT, SINGLE BLADDER, 30 IN. (76 CM)

## (undated) DEVICE — SPONGE LAP W18XL18IN WHT COT 4 PLY FLD STRUNG RADPQ DISP ST

## (undated) DEVICE — Device

## (undated) DEVICE — DRESSING,GAUZE,XEROFORM,CURAD,1"X8",ST: Brand: CURAD

## (undated) DEVICE — TIBIAL CORNER DRILL: Brand: INFINITY

## (undated) DEVICE — DOUBLE BASIN SET: Brand: MEDLINE INDUSTRIES, INC.

## (undated) DEVICE — TEMP FIX PIN TALAR RESECT GUIDE: Brand: INFINITY

## (undated) DEVICE — SET ORTHO STD STORTSTD2

## (undated) DEVICE — 4-PORT MANIFOLD: Brand: NEPTUNE 2

## (undated) DEVICE — KIT EVAC 400CC DIA1/8IN H PAT 12.5IN 3 SPR RND SHP PVC DRN

## (undated) DEVICE — SPLINT ORTH W5XL30IN PLSTR OF PARIS FAST IMMOB OF FRAC HI

## (undated) DEVICE — STOCKINETTE,DOUBLE PLY,6X48,STERILE: Brand: MEDLINE

## (undated) DEVICE — SNARE ENDOSCP L240CM LOOP W13MM SHTH DIA2.4MM SM OVL FLX

## (undated) DEVICE — STEINMANN PIN: Brand: INBONE

## (undated) DEVICE — GOWN,SIRUS,FABRNF,2XL,18/CS: Brand: MEDLINE

## (undated) DEVICE — PATIENT RETURN ELECTRODE, SINGLE-USE, CONTACT QUALITY MONITORING, ADULT, WITH 9FT CORD, FOR PATIENTS WEIGING OVER 33LBS. (15KG): Brand: MEGADYNE

## (undated) DEVICE — PACK PROCEDURE SURG GEN CUST

## (undated) DEVICE — COVER,TABLE,60X90,STERILE: Brand: MEDLINE

## (undated) DEVICE — PADDING CAST W6INXL4YD COT LO LINTING WYTEX

## (undated) DEVICE — STANDARD HYPODERMIC NEEDLE,POLYPROPYLENE HUB: Brand: MONOJECT

## (undated) DEVICE — SPONGE GZ W4XL4IN RAYON POLY FILL CVR W/ NONWOVEN FAB

## (undated) DEVICE — Device: Brand: POWER-FLO®

## (undated) DEVICE — BANDAGE,ELASTIC,ESMARK,STERILE,4"X9',LF: Brand: MEDLINE

## (undated) DEVICE — DRAPE C ARM W41XL74IN UNIV MOB W RUBBERBAND CLP

## (undated) DEVICE — PADDING,UNDERCAST,COTTON, 4"X4YD STERILE: Brand: MEDLINE

## (undated) DEVICE — INTENDED FOR TISSUE SEPARATION, AND OTHER PROCEDURES THAT REQUIRE A SHARP SURGICAL BLADE TO PUNCTURE OR CUT.: Brand: BARD-PARKER ® STAINLESS STEEL BLADES

## (undated) DEVICE — TOWEL,OR,DSP,ST,BLUE,STD,6/PK,12PK/CS: Brand: MEDLINE

## (undated) DEVICE — GOWN,SIRUS,FABRNF,XL,20/CS: Brand: MEDLINE

## (undated) DEVICE — BANDAGE COMPR W6INXL12FT SMOOTH FOR LIMB EXSANG ESMARCH

## (undated) DEVICE — GRADUATE TRIANG MEASURE 1000ML BLK PRNT

## (undated) DEVICE — STERILE HOOK LOCK LATEX FREE ELASTIC BANDAGE 4INX5YD: Brand: HOOK LOCK™

## (undated) DEVICE — TRAP POLYP ETRAP

## (undated) DEVICE — PRECISION THIN (9.0 X 0.38 X 25.0MM)

## (undated) DEVICE — COVER HNDL LT DISP

## (undated) DEVICE — DRESSING PETRO W3XL8IN OIL EMUL N ADH GZ KNIT IMPREG CELOS

## (undated) DEVICE — BANDAGE,GAUZE,BULKEE II,4.5"X4.1YD,STRL: Brand: MEDLINE

## (undated) DEVICE — SOLUTION IV IRRIG WATER 1000ML POUR BRL 2F7114

## (undated) DEVICE — BANDAGE COBAN 4 IN COMPR W4INXL5YD FOAM COHESIVE QUIK STK SELF ADH SFT

## (undated) DEVICE — CONTAINER VACUTAINER ANAER CULTURE SWAB

## (undated) DEVICE — FORCEPS BX OVL CUP FEN DISPOSABLE CAP L 160CM RAD JAW 4

## (undated) DEVICE — DRAPE,REIN 53X77,STERILE: Brand: MEDLINE

## (undated) DEVICE — ZIMMER® STERILE DISPOSABLE TOURNIQUET CUFF WITH PLC, DUAL PORT, SINGLE BLADDER, 34 IN. (86 CM)

## (undated) DEVICE — SCREW, BONE REMOVER: Brand: INBONE

## (undated) DEVICE — GAUZE,SPONGE,4"X4",8PLY,STRL,LF,10/TRAY: Brand: MEDLINE

## (undated) DEVICE — DRAPE,U/ SHT,SPLIT,PLAS,STERIL: Brand: MEDLINE

## (undated) DEVICE — BLADE, TONGUE, 6", STERILE: Brand: MEDLINE